# Patient Record
Sex: MALE | Race: WHITE | NOT HISPANIC OR LATINO | Employment: OTHER | URBAN - METROPOLITAN AREA
[De-identification: names, ages, dates, MRNs, and addresses within clinical notes are randomized per-mention and may not be internally consistent; named-entity substitution may affect disease eponyms.]

---

## 2017-01-10 ENCOUNTER — HOSPITAL ENCOUNTER (OUTPATIENT)
Dept: URGENT CARE | Facility: CLINIC | Age: 82
Discharge: HOME | End: 2017-01-10
Attending: FAMILY MEDICINE | Admitting: FAMILY MEDICINE

## 2017-12-12 ENCOUNTER — HOSPITAL ENCOUNTER (OUTPATIENT)
Dept: FAMILY MEDICINE CLINIC | Facility: CLINIC | Age: 82
Setting detail: SPECIMEN
Discharge: HOME | End: 2017-12-12
Attending: PREVENTIVE MEDICINE | Admitting: PREVENTIVE MEDICINE

## 2017-12-12 LAB — CALCIUM SERPL-MCNC: 9.5 MG/DL (ref 8.9–10.3)

## 2018-04-16 ENCOUNTER — HOSPITAL ENCOUNTER (OUTPATIENT)
Dept: FAMILY MEDICINE CLINIC | Facility: CLINIC | Age: 83
Setting detail: SPECIMEN
Discharge: HOME | End: 2018-04-16
Attending: PREVENTIVE MEDICINE | Admitting: PREVENTIVE MEDICINE

## 2018-04-16 LAB
ALBUMIN SERPL-MCNC: 3.9 G/DL (ref 3.5–4.8)
ALBUMIN/GLOB SERPL: 1.4 {RATIO} (ref 1–1.7)
ALP SERPL-CCNC: 59 IU/L (ref 32–91)
ALT SERPL-CCNC: 21 IU/L (ref 17–63)
ANION GAP SERPL CALC-SCNC: 10.9 MMOL/L (ref 10–20)
AST SERPL-CCNC: 23 IU/L (ref 15–41)
BASOPHILS # BLD AUTO: 0 10*3/UL (ref 0–0.2)
BASOPHILS NFR BLD AUTO: 1 % (ref 0–2)
BILIRUB SERPL-MCNC: 0.9 MG/DL (ref 0.3–1.2)
BUN SERPL-MCNC: 13 MG/DL (ref 8–20)
BUN/CREAT SERPL: 10 (ref 6.2–20.3)
CALCIUM SERPL-MCNC: 9.4 MG/DL (ref 8.9–10.3)
CHLORIDE SERPL-SCNC: 104 MMOL/L (ref 101–111)
CONV CO2: 28 MMOL/L (ref 22–32)
CONV TOTAL PROTEIN: 6.6 G/DL (ref 6.1–7.9)
CREAT UR-MCNC: 1.3 MG/DL (ref 0.7–1.2)
DIFFERENTIAL METHOD BLD: (no result)
EOSINOPHIL # BLD AUTO: 0.2 10*3/UL (ref 0–0.3)
EOSINOPHIL # BLD AUTO: 4 % (ref 0–3)
ERYTHROCYTE [DISTWIDTH] IN BLOOD BY AUTOMATED COUNT: 13.3 % (ref 11.5–14.5)
GLOBULIN UR ELPH-MCNC: 2.7 G/DL (ref 2.5–3.8)
GLUCOSE SERPL-MCNC: 95 MG/DL (ref 65–99)
HCT VFR BLD AUTO: 47.8 % (ref 40–54)
HGB BLD-MCNC: 15.7 G/DL (ref 14–18)
LYMPHOCYTES # BLD AUTO: 1.2 10*3/UL (ref 0.8–4.8)
LYMPHOCYTES NFR BLD AUTO: 18 % (ref 18–42)
MCH RBC QN AUTO: 27.6 PG (ref 26–32)
MCHC RBC AUTO-ENTMCNC: 32.9 G/DL (ref 32–36)
MCV RBC AUTO: 83.9 FL (ref 80–94)
MONOCYTES # BLD AUTO: 0.5 10*3/UL (ref 0.1–1.3)
MONOCYTES NFR BLD AUTO: 8 % (ref 2–11)
NEUTROPHILS # BLD AUTO: 4.7 10*3/UL (ref 2.3–8.6)
NEUTROPHILS NFR BLD AUTO: 69 % (ref 50–75)
NRBC BLD AUTO-RTO: 0 /100{WBCS}
NRBC/RBC NFR BLD MANUAL: 0 10*3/UL
PLATELET # BLD AUTO: 262 10*3/UL (ref 150–450)
PMV BLD AUTO: 7.7 FL (ref 7.4–10.4)
POTASSIUM SERPL-SCNC: 3.9 MMOL/L (ref 3.6–5.1)
RBC # BLD AUTO: 5.7 10*6/UL (ref 4.6–6)
SODIUM SERPL-SCNC: 139 MMOL/L (ref 136–144)
WBC # BLD AUTO: 6.6 10*3/UL (ref 4.5–11.5)

## 2018-05-21 ENCOUNTER — ON CAMPUS - OUTPATIENT (AMBULATORY)
Dept: URBAN - METROPOLITAN AREA HOSPITAL 77 | Facility: HOSPITAL | Age: 83
End: 2018-05-21

## 2018-05-21 DIAGNOSIS — Z86.010 PERSONAL HISTORY OF COLONIC POLYPS: ICD-10-CM

## 2018-05-21 DIAGNOSIS — D12.3 BENIGN NEOPLASM OF TRANSVERSE COLON: ICD-10-CM

## 2018-05-21 DIAGNOSIS — D12.2 BENIGN NEOPLASM OF ASCENDING COLON: ICD-10-CM

## 2018-05-21 DIAGNOSIS — K63.5 POLYP OF COLON: ICD-10-CM

## 2018-05-21 DIAGNOSIS — K64.8 OTHER HEMORRHOIDS: ICD-10-CM

## 2018-05-21 DIAGNOSIS — K57.90 DIVERTICULOSIS OF INTESTINE, PART UNSPECIFIED, WITHOUT PERFO: ICD-10-CM

## 2018-05-21 PROCEDURE — 45385 COLONOSCOPY W/LESION REMOVAL: CPT | Mod: PT | Performed by: INTERNAL MEDICINE

## 2018-05-21 PROCEDURE — 45388 COLONOSCOPY W/ABLATION: CPT | Mod: 59,PT | Performed by: INTERNAL MEDICINE

## 2018-05-21 PROCEDURE — 45388 COLONOSCOPY W/ABLATION: CPT | Mod: PT,59 | Performed by: INTERNAL MEDICINE

## 2019-07-15 ENCOUNTER — OFFICE (AMBULATORY)
Dept: URBAN - METROPOLITAN AREA CLINIC 64 | Facility: CLINIC | Age: 84
End: 2019-07-15

## 2019-07-15 VITALS
WEIGHT: 185 LBS | HEART RATE: 77 BPM | SYSTOLIC BLOOD PRESSURE: 128 MMHG | HEIGHT: 68 IN | DIASTOLIC BLOOD PRESSURE: 82 MMHG

## 2019-07-15 DIAGNOSIS — R19.4 CHANGE IN BOWEL HABIT: ICD-10-CM

## 2019-07-15 DIAGNOSIS — Z86.010 PERSONAL HISTORY OF COLONIC POLYPS: ICD-10-CM

## 2019-07-15 PROCEDURE — 99213 OFFICE O/P EST LOW 20 MIN: CPT | Performed by: INTERNAL MEDICINE

## 2019-07-15 RX ORDER — SORBITOL SOLUTION 70 %
SOLUTION, ORAL MISCELLANEOUS
Qty: 100 | Refills: 0 | Status: ACTIVE
Start: 2019-07-15

## 2019-08-16 RX ORDER — DILTIAZEM HYDROCHLORIDE 180 MG/1
CAPSULE, COATED, EXTENDED RELEASE ORAL
Qty: 90 CAPSULE | Refills: 3 | Status: SHIPPED | OUTPATIENT
Start: 2019-08-16 | End: 2020-07-02

## 2019-11-21 ENCOUNTER — OFFICE VISIT (OUTPATIENT)
Dept: FAMILY MEDICINE CLINIC | Facility: CLINIC | Age: 84
End: 2019-11-21

## 2019-11-21 VITALS
SYSTOLIC BLOOD PRESSURE: 158 MMHG | HEART RATE: 79 BPM | OXYGEN SATURATION: 98 % | BODY MASS INDEX: 29.19 KG/M2 | TEMPERATURE: 97.7 F | DIASTOLIC BLOOD PRESSURE: 88 MMHG | RESPIRATION RATE: 16 BRPM | HEIGHT: 67 IN | WEIGHT: 186 LBS

## 2019-11-21 DIAGNOSIS — D69.6 THROMBOCYTOPENIA (HCC): ICD-10-CM

## 2019-11-21 DIAGNOSIS — M85.88 OSTEOPENIA OF OTHER SITE: ICD-10-CM

## 2019-11-21 DIAGNOSIS — M85.80 OSTEOPENIA, UNSPECIFIED LOCATION: ICD-10-CM

## 2019-11-21 DIAGNOSIS — E78.5 HYPERLIPIDEMIA, UNSPECIFIED HYPERLIPIDEMIA TYPE: ICD-10-CM

## 2019-11-21 DIAGNOSIS — Z85.46 HISTORY OF MALIGNANT NEOPLASM OF PROSTATE: Primary | ICD-10-CM

## 2019-11-21 DIAGNOSIS — R93.6 ABNORMAL FINDINGS ON DIAGNOSTIC IMAGING OF LIMBS: ICD-10-CM

## 2019-11-21 DIAGNOSIS — E55.9 VITAMIN D DEFICIENCY: ICD-10-CM

## 2019-11-21 DIAGNOSIS — I10 ESSENTIAL HYPERTENSION: ICD-10-CM

## 2019-11-21 PROBLEM — R19.8 ALTERED BOWEL FUNCTION: Status: ACTIVE | Noted: 2019-05-09

## 2019-11-21 PROBLEM — Z92.3 HX OF RADIATION THERAPY: Status: ACTIVE | Noted: 2017-10-03

## 2019-11-21 PROBLEM — D22.9 MELANOCYTIC NEVUS: Status: ACTIVE | Noted: 2019-05-09

## 2019-11-21 PROBLEM — G47.9 SLEEP DISORDER: Status: ACTIVE | Noted: 2017-10-23

## 2019-11-21 PROBLEM — M54.2 NECK PAIN: Status: ACTIVE | Noted: 2018-04-12

## 2019-11-21 PROBLEM — Z80.0 FAMILY HISTORY OF MALIGNANT NEOPLASM OF COLON: Status: ACTIVE | Noted: 2019-05-09

## 2019-11-21 LAB
25(OH)D3 SERPL-MCNC: 27.8 NG/ML (ref 30–100)
ALBUMIN SERPL-MCNC: 4.3 G/DL (ref 3.5–5.2)
ALBUMIN/GLOB SERPL: 1.3 G/DL
ALP SERPL-CCNC: 70 U/L (ref 39–117)
ALT SERPL W P-5'-P-CCNC: 20 U/L (ref 1–41)
ANION GAP SERPL CALCULATED.3IONS-SCNC: 11.6 MMOL/L (ref 5–15)
AST SERPL-CCNC: 20 U/L (ref 1–40)
BASOPHILS # BLD AUTO: 0.09 10*3/MM3 (ref 0–0.2)
BASOPHILS NFR BLD AUTO: 1.5 % (ref 0–1.5)
BILIRUB SERPL-MCNC: 0.3 MG/DL (ref 0.2–1.2)
BUN BLD-MCNC: 17 MG/DL (ref 8–23)
BUN/CREAT SERPL: 12.5 (ref 7–25)
CALCIUM SPEC-SCNC: 9.5 MG/DL (ref 8.6–10.5)
CHLORIDE SERPL-SCNC: 105 MMOL/L (ref 98–107)
CHOLEST SERPL-MCNC: 215 MG/DL (ref 0–200)
CO2 SERPL-SCNC: 25.4 MMOL/L (ref 22–29)
CREAT BLD-MCNC: 1.36 MG/DL (ref 0.76–1.27)
DEPRECATED RDW RBC AUTO: 39.3 FL (ref 37–54)
EOSINOPHIL # BLD AUTO: 0.24 10*3/MM3 (ref 0–0.4)
EOSINOPHIL NFR BLD AUTO: 4 % (ref 0.3–6.2)
ERYTHROCYTE [DISTWIDTH] IN BLOOD BY AUTOMATED COUNT: 12.8 % (ref 12.3–15.4)
GFR SERPL CREATININE-BSD FRML MDRD: 50 ML/MIN/1.73
GLOBULIN UR ELPH-MCNC: 3.2 GM/DL
GLUCOSE BLD-MCNC: 100 MG/DL (ref 65–99)
HCT VFR BLD AUTO: 47.6 % (ref 37.5–51)
HDLC SERPL-MCNC: 40 MG/DL (ref 40–60)
HGB BLD-MCNC: 15.7 G/DL (ref 13–17.7)
IMM GRANULOCYTES # BLD AUTO: 0.05 10*3/MM3 (ref 0–0.05)
IMM GRANULOCYTES NFR BLD AUTO: 0.8 % (ref 0–0.5)
LDLC SERPL CALC-MCNC: 157 MG/DL (ref 0–100)
LDLC/HDLC SERPL: 3.94 {RATIO}
LYMPHOCYTES # BLD AUTO: 0.83 10*3/MM3 (ref 0.7–3.1)
LYMPHOCYTES NFR BLD AUTO: 14 % (ref 19.6–45.3)
MCH RBC QN AUTO: 27.8 PG (ref 26.6–33)
MCHC RBC AUTO-ENTMCNC: 33 G/DL (ref 31.5–35.7)
MCV RBC AUTO: 84.2 FL (ref 79–97)
MONOCYTES # BLD AUTO: 0.5 10*3/MM3 (ref 0.1–0.9)
MONOCYTES NFR BLD AUTO: 8.4 % (ref 5–12)
NEUTROPHILS # BLD AUTO: 4.23 10*3/MM3 (ref 1.7–7)
NEUTROPHILS NFR BLD AUTO: 71.3 % (ref 42.7–76)
NRBC BLD AUTO-RTO: 0 /100 WBC (ref 0–0.2)
PLATELET # BLD AUTO: 260 10*3/MM3 (ref 140–450)
PMV BLD AUTO: 10.4 FL (ref 6–12)
POTASSIUM BLD-SCNC: 4.4 MMOL/L (ref 3.5–5.2)
PROT SERPL-MCNC: 7.5 G/DL (ref 6–8.5)
RBC # BLD AUTO: 5.65 10*6/MM3 (ref 4.14–5.8)
SODIUM BLD-SCNC: 142 MMOL/L (ref 136–145)
TRIGL SERPL-MCNC: 88 MG/DL (ref 0–150)
VLDLC SERPL-MCNC: 17.6 MG/DL (ref 5–40)
WBC NRBC COR # BLD: 5.94 10*3/MM3 (ref 3.4–10.8)

## 2019-11-21 PROCEDURE — 99213 OFFICE O/P EST LOW 20 MIN: CPT | Performed by: PREVENTIVE MEDICINE

## 2019-11-21 PROCEDURE — G0009 ADMIN PNEUMOCOCCAL VACCINE: HCPCS | Performed by: PREVENTIVE MEDICINE

## 2019-11-21 PROCEDURE — 80061 LIPID PANEL: CPT | Performed by: PREVENTIVE MEDICINE

## 2019-11-21 PROCEDURE — 3008F BODY MASS INDEX DOCD: CPT | Performed by: PREVENTIVE MEDICINE

## 2019-11-21 PROCEDURE — 36415 COLL VENOUS BLD VENIPUNCTURE: CPT | Performed by: PREVENTIVE MEDICINE

## 2019-11-21 PROCEDURE — 90670 PCV13 VACCINE IM: CPT | Performed by: PREVENTIVE MEDICINE

## 2019-11-21 PROCEDURE — 85025 COMPLETE CBC W/AUTO DIFF WBC: CPT | Performed by: PREVENTIVE MEDICINE

## 2019-11-21 PROCEDURE — 80053 COMPREHEN METABOLIC PANEL: CPT | Performed by: PREVENTIVE MEDICINE

## 2019-11-21 PROCEDURE — 82306 VITAMIN D 25 HYDROXY: CPT | Performed by: PREVENTIVE MEDICINE

## 2019-11-21 PROCEDURE — G0439 PPPS, SUBSEQ VISIT: HCPCS | Performed by: PREVENTIVE MEDICINE

## 2019-11-21 NOTE — PROGRESS NOTES
The ABCs of the Annual Wellness Visit  Subsequent Medicare Wellness Visit    Chief Complaint   Patient presents with   • Medicare Wellness-subsequent     fasting    • Hypertension   • Hyperlipidemia       Subjective   History of Present Illness:  Hever Guallpa is a 84 y.o. male who presents for a Subsequent Medicare Wellness Visit.    HEALTH RISK ASSESSMENT    Recent Hospitalizations:  No hospitalization(s) within the last year.    Current Medical Providers:  Patient Care Team:  Maddie Martinez MD as PCP - General (Family Medicine)  Maddie Martinez MD as PCP - Claims Attributed    Smoking Status:  Social History     Tobacco Use   Smoking Status Former Smoker   • Types: Cigarettes   • Start date:    • Last attempt to quit:    • Years since quittin.9   Smokeless Tobacco Never Used       Alcohol Consumption:  Social History     Substance and Sexual Activity   Alcohol Use No   • Frequency: Never       Depression Screen:   PHQ-2/PHQ-9 Depression Screening 2019   Little interest or pleasure in doing things 0   Feeling down, depressed, or hopeless 0   Total Score 0       Fall Risk Screen:  ESSIEADI Fall Risk Assessment was completed, and patient is at LOW risk for falls.Assessment completed on:2019    Health Habits and Functional and Cognitive Screening:  Functional & Cognitive Status 2019   Do you have difficulty preparing food and eating? No   Do you have difficulty bathing yourself, getting dressed or grooming yourself? No   Do you have difficulty using the toilet? No   Do you have difficulty moving around from place to place? No   Do you have trouble with steps or getting out of a bed or a chair? No   Current Diet Well Balanced Diet   Dental Exam Up to date   Eye Exam Not up to date   Exercise (times per week) 7 times per week   Current Exercise Activities Include Walking   Do you need help using the phone?  No   Are you deaf or do you have serious difficulty hearing?  Yes   Do  you need help with transportation? No   Do you need help shopping? No   Do you need help preparing meals?  No   Do you need help with housework?  No   Do you need help with laundry? No   Do you need help taking your medications? No   Do you need help managing money? No   Do you ever drive or ride in a car without wearing a seat belt? No   Have you felt unusual stress, anger or loneliness in the last month? No   Who do you live with? Spouse   If you need help, do you have trouble finding someone available to you? No   Have you been bothered in the last four weeks by sexual problems? No   Do you have difficulty concentrating, remembering or making decisions? No         Does the patient have evidence of cognitive impairment? No    Asprin use counseling:Does not need ASA (and currently is not on it)    Age-appropriate Screening Schedule:  Refer to the list below for future screening recommendations based on patient's age, sex and/or medical conditions. Orders for these recommended tests are listed in the plan section. The patient has been provided with a written plan.    Health Maintenance   Topic Date Due   • TDAP/TD VACCINES (1 - Tdap) 08/23/1954   • ZOSTER VACCINE (1 of 2) 08/23/1985   • PNEUMOCOCCAL VACCINES (65+ LOW/MEDIUM RISK) (2 of 2 - PCV13) 06/01/2011   • LIPID PANEL  11/21/2019   • DXA SCAN  11/21/2019   • INFLUENZA VACCINE  Completed          The following portions of the patient's history were reviewed and updated as appropriate: allergies, current medications, past family history, past medical history, past social history, past surgical history and problem list.    Outpatient Medications Prior to Visit   Medication Sig Dispense Refill   • diltiaZEM CD (CARDIZEM CD) 180 MG 24 hr capsule TAKE 1 CAPSULE BY MOUTH EVERY DAY 90 capsule 3     No facility-administered medications prior to visit.        Patient Active Problem List   Diagnosis   • Neck pain   • Body mass index (BMI) of 28.0-28.9 in adult   • Altered  "bowel function   • Family history of malignant neoplasm of colon   • History of malignant neoplasm of prostate   • Hx of radiation therapy   • Hyperlipidemia   • Hypertension   • Melanocytic nevus   • Osteopenia   • Sleep disorder   • Thrombocytopenia (CMS/HCC)   • Vitamin D deficiency       Advanced Care Planning:  Patient does not have an advance directive - information provided to the patient today    Review of Systems    Compared to one year ago, the patient feels his physical health is better  Compared to one year ago, the patient feels his mental health is the same.    Reviewed chart for potential of high risk medication in the elderly: yes  Reviewed chart for potential of harmful drug interactions in the elderly:yes    Objective         Vitals:    11/21/19 1017 11/21/19 1025   BP: 151/95 158/88   BP Location: Right arm Left arm   Patient Position: Sitting Sitting   Cuff Size: Large Adult Large Adult   Pulse: 79    Resp: 16    Temp: 97.7 °F (36.5 °C)    TempSrc: Oral    SpO2: 98%    Weight: 84.4 kg (186 lb)    Height: 170.2 cm (67\")    PainSc: 0-No pain        Body mass index is 29.13 kg/m².  Discussed the patient's BMI with him. The BMI is in the acceptable range.    Physical Exam          Assessment/Plan   Medicare Risks and Personalized Health Plan  CMS Preventative Services Quick Reference  Advance Directive Discussion    The above risks/problems have been discussed with the patient.  Pertinent information has been shared with the patient in the After Visit Summary.  Follow up plans and orders are seen below in the Assessment/Plan Section.    Diagnoses and all orders for this visit:    1. History of malignant neoplasm of prostate (Primary)  -     PSA Screen    2. Essential hypertension  -     CBC Auto Differential  -     Comprehensive Metabolic Panel    3. Hyperlipidemia, unspecified hyperlipidemia type  -     Lipid Panel    4. Thrombocytopenia (CMS/HCC)  -     CBC Auto Differential    5. Vitamin D " deficiency  -     Vitamin D 25 Hydroxy    6. Osteopenia, unspecified location  -     DEXA Bone Density Axial; Future      Follow Up:  No Follow-up on file.     An After Visit Summary and PPPS were given to the patient.

## 2019-11-21 NOTE — PROGRESS NOTES
"Alonzo Guallpa is a 84 y.o. male presents for   Chief Complaint   Patient presents with   • Medicare Wellness-subsequent     fasting    • Hypertension   • Hyperlipidemia     Abdominal improving with Colestipol and slight cough with am creamy sputum but no fever for a few days since got cold  Health Maintenance Due   Topic Date Due   • TDAP/TD VACCINES (1 - Tdap) 08/23/1954   • ZOSTER VACCINE (1 of 2) 08/23/1985   • PNEUMOCOCCAL VACCINES (65+ LOW/MEDIUM RISK) (2 of 2 - PCV13) 06/01/2011   • MEDICARE ANNUAL WELLNESS  11/21/2019   • LIPID PANEL  11/21/2019   • DXA SCAN  11/21/2019       History of Present Illness     Vitals:    11/21/19 1017 11/21/19 1025   BP: 151/95 158/88   BP Location: Right arm Left arm   Patient Position: Sitting Sitting   Cuff Size: Large Adult Large Adult   Pulse: 79    Resp: 16    Temp: 97.7 °F (36.5 °C)    TempSrc: Oral    SpO2: 98%    Weight: 84.4 kg (186 lb)    Height: 170.2 cm (67\")      Body mass index is 29.13 kg/m².    Current Outpatient Medications on File Prior to Visit   Medication Sig Dispense Refill   • diltiaZEM CD (CARDIZEM CD) 180 MG 24 hr capsule TAKE 1 CAPSULE BY MOUTH EVERY DAY 90 capsule 3     No current facility-administered medications on file prior to visit.        The following portions of the patient's history were reviewed and updated as appropriate: allergies, current medications, past family history, past medical history, past social history, past surgical history and problem list.    Review of Systems   Constitutional: Negative.    HENT: Negative.  Negative for sinus pressure and sore throat.    Eyes: Negative.    Respiratory: Positive for cough.    Cardiovascular: Negative.    Gastrointestinal: Positive for abdominal pain.   Endocrine: Negative.    Genitourinary: Negative.    Musculoskeletal: Negative.    Skin: Negative.    Allergic/Immunologic: Positive for environmental allergies.   Neurological: Negative.    Hematological: Negative.  "   Psychiatric/Behavioral: Negative.        Objective   Physical Exam   Constitutional: He is oriented to person, place, and time. He appears well-developed and well-nourished.   HENT:   Head: Normocephalic and atraumatic.   Eyes: Conjunctivae and EOM are normal. Pupils are equal, round, and reactive to light.   Neck: Normal range of motion. Neck supple.   Cardiovascular: Normal rate, regular rhythm, normal heart sounds and intact distal pulses.   Pulmonary/Chest: Effort normal and breath sounds normal.   Abdominal: Soft. Bowel sounds are normal.   Musculoskeletal: Normal range of motion.   Neurological: He is alert and oriented to person, place, and time.   Skin: Skin is warm and dry.   Psychiatric: He has a normal mood and affect.   Nursing note and vitals reviewed.    PHQ-9 Total Score: 0    Assessment/Plan   Hever was seen today for medicare wellness-subsequent, hypertension and hyperlipidemia.    Diagnoses and all orders for this visit:    History of malignant neoplasm of prostate    Essential hypertension  -     CBC Auto Differential  -     Comprehensive Metabolic Panel    Hyperlipidemia, unspecified hyperlipidemia type  -     Lipid Panel    Thrombocytopenia (CMS/HCC)  -     CBC Auto Differential    Vitamin D deficiency  -     Vitamin D 25 Hydroxy    Osteopenia, unspecified location  -     DEXA Bone Density Axial; Future    Abnormal findings on diagnostic imaging of limbs   -     DEXA Bone Density Axial; Future    Other orders  -     Cancel: PSA Screen  -     Pneumococcal Conjugate Vaccine 13-Valent All (PCV13)        Patient Instructions   Call Dr. Florez if abdomen still problem 2/14/2020.  Wait 2 months to consider Td and ShingRix  Advance Directive    Advance directives are legal documents that let you make choices ahead of time about your health care and medical treatment in case you become unable to communicate for yourself. Advance directives are a way for you to communicate your wishes to family,  friends, and health care providers. This can help convey your decisions about end-of-life care if you become unable to communicate.  Discussing and writing advance directives should happen over time rather than all at once. Advance directives can be changed depending on your situation and what you want, even after you have signed the advance directives.  If you do not have an advance directive, some states assign family decision makers to act on your behalf based on how closely you are related to them. Each state has its own laws regarding advance directives. You may want to check with your health care provider, , or state representative about the laws in your state. There are different types of advance directives, such as:  · Medical power of .  · Living will.  · Do not resuscitate (DNR) or do not attempt resuscitation (DNAR) order.  Health care proxy and medical power of   A health care proxy, also called a health care agent, is a person who is appointed to make medical decisions for you in cases in which you are unable to make the decisions yourself. Generally, people choose someone they know well and trust to represent their preferences. Make sure to ask this person for an agreement to act as your proxy. A proxy may have to exercise judgment in the event of a medical decision for which your wishes are not known.  A medical power of  is a legal document that names your health care proxy. Depending on the laws in your state, after the document is written, it may also need to be:  · Signed.  · Notarized.  · Dated.  · Copied.  · Witnessed.  · Incorporated into your medical record.  You may also want to appoint someone to manage your financial affairs in a situation in which you are unable to do so. This is called a durable power of  for finances. It is a separate legal document from the durable power of  for health care. You may choose the same person or someone  different from your health care proxy to act as your agent in financial matters.  If you do not appoint a proxy, or if there is a concern that the proxy is not acting in your best interests, a court-appointed guardian may be designated to act on your behalf.  Living will  A living will is a set of instructions documenting your wishes about medical care when you cannot express them yourself. Health care providers should keep a copy of your living will in your medical record. You may want to give a copy to family members or friends. To alert caregivers in case of an emergency, you can place a card in your wallet to let them know that you have a living will and where they can find it. A living will is used if you become:  · Terminally ill.  · Incapacitated.  · Unable to communicate or make decisions.  Items to consider in your living will include:  · The use or non-use of life-sustaining equipment, such as dialysis machines and breathing machines (ventilators).  · A DNR or DNAR order, which is the instruction not to use cardiopulmonary resuscitation (CPR) if breathing or heartbeat stops.  · The use or non-use of tube feeding.  · Withholding of food and fluids.  · Comfort (palliative) care when the goal becomes comfort rather than a cure.  · Organ and tissue donation.  A living will does not give instructions for distributing your money and property if you should pass away. It is recommended that you seek the advice of a  when writing a will. Decisions about taxes, beneficiaries, and asset distribution will be legally binding. This process can relieve your family and friends of any concerns surrounding disputes or questions that may come up about the distribution of your assets.  DNR or DNAR  A DNR or DNAR order is a request not to have CPR in the event that your heart stops beating or you stop breathing. If a DNR or DNAR order has not been made and shared, a health care provider will try to help any patient whose  heart has stopped or who has stopped breathing. If you plan to have surgery, talk with your health care provider about how your DNR or DNAR order will be followed if problems occur.  Summary  · Advance directives are the legal documents that allow you to make choices ahead of time about your health care and medical treatment in case you become unable to communicate for yourself.  · The process of discussing and writing advance directives should happen over time. You can change the advance directives, even after you have signed them.  · Advance directives include DNR or DNAR orders, living kelly, and designating an agent as your medical power of .  This information is not intended to replace advice given to you by your health care provider. Make sure you discuss any questions you have with your health care provider.  Document Released: 03/26/2009 Document Revised: 11/06/2017 Document Reviewed: 11/06/2017  [employer] Interactive Patient Education © 2019 [employer]      Needs eye exam

## 2019-11-21 NOTE — PATIENT INSTRUCTIONS
Call Dr. Florez if abdomen still problem 2/14/2020.  Wait 2 months to consider Td and ShingRix  Advance Directive    Advance directives are legal documents that let you make choices ahead of time about your health care and medical treatment in case you become unable to communicate for yourself. Advance directives are a way for you to communicate your wishes to family, friends, and health care providers. This can help convey your decisions about end-of-life care if you become unable to communicate.  Discussing and writing advance directives should happen over time rather than all at once. Advance directives can be changed depending on your situation and what you want, even after you have signed the advance directives.  If you do not have an advance directive, some states assign family decision makers to act on your behalf based on how closely you are related to them. Each state has its own laws regarding advance directives. You may want to check with your health care provider, , or state representative about the laws in your state. There are different types of advance directives, such as:  · Medical power of .  · Living will.  · Do not resuscitate (DNR) or do not attempt resuscitation (DNAR) order.  Health care proxy and medical power of   A health care proxy, also called a health care agent, is a person who is appointed to make medical decisions for you in cases in which you are unable to make the decisions yourself. Generally, people choose someone they know well and trust to represent their preferences. Make sure to ask this person for an agreement to act as your proxy. A proxy may have to exercise judgment in the event of a medical decision for which your wishes are not known.  A medical power of  is a legal document that names your health care proxy. Depending on the laws in your state, after the document is written, it may also need to  be:  · Signed.  · Notarized.  · Dated.  · Copied.  · Witnessed.  · Incorporated into your medical record.  You may also want to appoint someone to manage your financial affairs in a situation in which you are unable to do so. This is called a durable power of  for finances. It is a separate legal document from the durable power of  for health care. You may choose the same person or someone different from your health care proxy to act as your agent in financial matters.  If you do not appoint a proxy, or if there is a concern that the proxy is not acting in your best interests, a court-appointed guardian may be designated to act on your behalf.  Living will  A living will is a set of instructions documenting your wishes about medical care when you cannot express them yourself. Health care providers should keep a copy of your living will in your medical record. You may want to give a copy to family members or friends. To alert caregivers in case of an emergency, you can place a card in your wallet to let them know that you have a living will and where they can find it. A living will is used if you become:  · Terminally ill.  · Incapacitated.  · Unable to communicate or make decisions.  Items to consider in your living will include:  · The use or non-use of life-sustaining equipment, such as dialysis machines and breathing machines (ventilators).  · A DNR or DNAR order, which is the instruction not to use cardiopulmonary resuscitation (CPR) if breathing or heartbeat stops.  · The use or non-use of tube feeding.  · Withholding of food and fluids.  · Comfort (palliative) care when the goal becomes comfort rather than a cure.  · Organ and tissue donation.  A living will does not give instructions for distributing your money and property if you should pass away. It is recommended that you seek the advice of a  when writing a will. Decisions about taxes, beneficiaries, and asset distribution will be  legally binding. This process can relieve your family and friends of any concerns surrounding disputes or questions that may come up about the distribution of your assets.  DNR or DNAR  A DNR or DNAR order is a request not to have CPR in the event that your heart stops beating or you stop breathing. If a DNR or DNAR order has not been made and shared, a health care provider will try to help any patient whose heart has stopped or who has stopped breathing. If you plan to have surgery, talk with your health care provider about how your DNR or DNAR order will be followed if problems occur.  Summary  · Advance directives are the legal documents that allow you to make choices ahead of time about your health care and medical treatment in case you become unable to communicate for yourself.  · The process of discussing and writing advance directives should happen over time. You can change the advance directives, even after you have signed them.  · Advance directives include DNR or DNAR orders, living kelly, and designating an agent as your medical power of .  This information is not intended to replace advice given to you by your health care provider. Make sure you discuss any questions you have with your health care provider.  Document Released: 03/26/2009 Document Revised: 11/06/2017 Document Reviewed: 11/06/2017  [employer] Interactive Patient Education © 2019 [employer]      Needs eye exam

## 2020-07-02 RX ORDER — DILTIAZEM HYDROCHLORIDE 180 MG/1
CAPSULE, COATED, EXTENDED RELEASE ORAL
Qty: 90 CAPSULE | Refills: 1 | Status: SHIPPED | OUTPATIENT
Start: 2020-07-02

## 2021-10-28 ENCOUNTER — FLU SHOT (OUTPATIENT)
Dept: FAMILY MEDICINE CLINIC | Facility: CLINIC | Age: 86
End: 2021-10-28

## 2021-10-28 DIAGNOSIS — Z23 NEED FOR INFLUENZA VACCINATION: Primary | ICD-10-CM

## 2021-10-28 PROCEDURE — G0008 ADMIN INFLUENZA VIRUS VAC: HCPCS | Performed by: PREVENTIVE MEDICINE

## 2021-10-28 PROCEDURE — 90662 IIV NO PRSV INCREASED AG IM: CPT | Performed by: PREVENTIVE MEDICINE

## 2023-05-17 ENCOUNTER — ON CAMPUS - OUTPATIENT (AMBULATORY)
Dept: URBAN - METROPOLITAN AREA HOSPITAL 77 | Facility: HOSPITAL | Age: 88
End: 2023-05-17

## 2023-05-17 DIAGNOSIS — K62.1 RECTAL POLYP: ICD-10-CM

## 2023-05-17 DIAGNOSIS — D12.3 BENIGN NEOPLASM OF TRANSVERSE COLON: ICD-10-CM

## 2023-05-17 DIAGNOSIS — Z86.010 PERSONAL HISTORY OF COLONIC POLYPS: ICD-10-CM

## 2023-05-17 DIAGNOSIS — D12.2 BENIGN NEOPLASM OF ASCENDING COLON: ICD-10-CM

## 2023-05-17 DIAGNOSIS — K57.30 DIVERTICULOSIS OF LARGE INTESTINE WITHOUT PERFORATION OR ABS: ICD-10-CM

## 2023-05-17 DIAGNOSIS — K64.8 OTHER HEMORRHOIDS: ICD-10-CM

## 2023-05-17 PROCEDURE — 45385 COLONOSCOPY W/LESION REMOVAL: CPT | Mod: PT | Performed by: INTERNAL MEDICINE

## 2024-03-26 ENCOUNTER — HOSPITAL ENCOUNTER (INPATIENT)
Facility: HOSPITAL | Age: 89
LOS: 4 days | Discharge: NURSING, REHAB OR LONG-TERM CARE | DRG: 393 | End: 2024-03-31
Attending: EMERGENCY MEDICINE | Admitting: EMERGENCY MEDICINE
Payer: MEDICARE

## 2024-03-26 DIAGNOSIS — T18.128A FOOD IMPACTION OF ESOPHAGUS, INITIAL ENCOUNTER: Primary | ICD-10-CM

## 2024-03-26 DIAGNOSIS — R13.10 DYSPHAGIA, UNSPECIFIED TYPE: ICD-10-CM
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
## 2024-03-26 PROCEDURE — 25010000002 GLUCAGON (RDNA) PER 1 MG: Performed by: NURSE PRACTITIONER

## 2024-03-26 PROCEDURE — G0378 HOSPITAL OBSERVATION PER HR: HCPCS

## 2024-03-26 PROCEDURE — 99285 EMERGENCY DEPT VISIT HI MDM: CPT

## 2024-03-26 RX ORDER — SODIUM CHLORIDE 0.9 % (FLUSH) 0.9 %
10 SYRINGE (ML) INJECTION AS NEEDED
Status: DISCONTINUED | OUTPATIENT
Start: 2024-03-26 | End: 2024-03-31 | Stop reason: HOSPADM

## 2024-03-26 RX ORDER — IBUPROFEN 600 MG/1
1 TABLET ORAL ONCE
Status: COMPLETED | OUTPATIENT
Start: 2024-03-26 | End: 2024-03-26

## 2024-03-26 RX ORDER — ONDANSETRON 2 MG/ML
4 INJECTION INTRAMUSCULAR; INTRAVENOUS EVERY 6 HOURS PRN
Status: DISCONTINUED | OUTPATIENT
Start: 2024-03-26 | End: 2024-03-27 | Stop reason: SDUPTHER

## 2024-03-26 RX ORDER — SODIUM CHLORIDE 9 MG/ML
40 INJECTION, SOLUTION INTRAVENOUS AS NEEDED
Status: DISCONTINUED | OUTPATIENT
Start: 2024-03-26 | End: 2024-03-31 | Stop reason: HOSPADM

## 2024-03-26 RX ORDER — AMOXICILLIN 250 MG
2 CAPSULE ORAL 2 TIMES DAILY PRN
Status: DISCONTINUED | OUTPATIENT
Start: 2024-03-26 | End: 2024-03-30

## 2024-03-26 RX ORDER — POLYETHYLENE GLYCOL 3350 17 G/17G
17 POWDER, FOR SOLUTION ORAL DAILY PRN
Status: DISCONTINUED | OUTPATIENT
Start: 2024-03-26 | End: 2024-03-30

## 2024-03-26 RX ORDER — CHOLECALCIFEROL (VITAMIN D3) 125 MCG
5 CAPSULE ORAL NIGHTLY PRN
Status: DISCONTINUED | OUTPATIENT
Start: 2024-03-26 | End: 2024-03-30

## 2024-03-26 RX ORDER — BISACODYL 10 MG
10 SUPPOSITORY, RECTAL RECTAL DAILY PRN
Status: DISCONTINUED | OUTPATIENT
Start: 2024-03-26 | End: 2024-03-30

## 2024-03-26 RX ORDER — BISACODYL 5 MG/1
5 TABLET, DELAYED RELEASE ORAL DAILY PRN
Status: DISCONTINUED | OUTPATIENT
Start: 2024-03-26 | End: 2024-03-30

## 2024-03-26 RX ORDER — SODIUM CHLORIDE 0.9 % (FLUSH) 0.9 %
10 SYRINGE (ML) INJECTION EVERY 12 HOURS SCHEDULED
Status: DISCONTINUED | OUTPATIENT
Start: 2024-03-27 | End: 2024-03-31 | Stop reason: HOSPADM

## 2024-03-26 RX ORDER — ACETAMINOPHEN 325 MG/1
650 TABLET ORAL EVERY 4 HOURS PRN
Status: DISCONTINUED | OUTPATIENT
Start: 2024-03-26 | End: 2024-03-30

## 2024-03-26 RX ADMIN — Medication 1 MG: at 22:04

## 2024-03-26 NOTE — LETTER
EMS Transport Request  For use at [facility], [place], [place], [place], and [place] only   Patient Name: Hever Guallpa : 1935   Weight:82.7 kg (182 lb 5.1 oz) Pick-up Location: [facility] BLS/ALS: BLS/ALS: BLS   Insurance: MEDICARE Auth End Date:    Pre-Cert #: D/C Summary complete:    Destination: Other [facility]   Contact Precautions: None   Equipment (O2, Fluids, etc.): None   Arrive By Date/Time: 3/31/2024 1500 Stretcher/WC: Wheelchair   CM Requesting: Tamie Loera RN Ext: [phone]   Notes/Medical Necessity: Please verify readiness with floor nurse.     ______________________________________________________________________    *Only 2 patient bags OR 1 carry-on size bag are permitted.  Wheelchairs and walkers CANNOT transported with the patient. Acknowledge: Yes

## 2024-03-27 ENCOUNTER — INPATIENT HOSPITAL (AMBULATORY)
Dept: URBAN - METROPOLITAN AREA HOSPITAL 84 | Facility: HOSPITAL | Age: 89
End: 2024-03-27
Payer: COMMERCIAL

## 2024-03-27 ENCOUNTER — ANESTHESIA (OUTPATIENT)
Dept: GASTROENTEROLOGY | Facility: HOSPITAL | Age: 89
End: 2024-03-27
Payer: MEDICARE

## 2024-03-27 ENCOUNTER — APPOINTMENT (OUTPATIENT)
Dept: CT IMAGING | Facility: HOSPITAL | Age: 89
DRG: 393 | End: 2024-03-27
Payer: MEDICARE

## 2024-03-27 ENCOUNTER — ANESTHESIA EVENT (OUTPATIENT)
Dept: GASTROENTEROLOGY | Facility: HOSPITAL | Age: 89
End: 2024-03-27
Payer: MEDICARE

## 2024-03-27 DIAGNOSIS — R13.10 DYSPHAGIA, UNSPECIFIED: ICD-10-CM

## 2024-03-27 DIAGNOSIS — K31.89 OTHER DISEASES OF STOMACH AND DUODENUM: ICD-10-CM

## 2024-03-27 DIAGNOSIS — T18.128A FOOD IN ESOPHAGUS CAUSING OTHER INJURY, INITIAL ENCOUNTER: ICD-10-CM

## 2024-03-27 LAB
ANION GAP SERPL CALCULATED.3IONS-SCNC: 11 MMOL/L (ref 5–15)
BASOPHILS # BLD AUTO: 0.06 10*3/MM3 (ref 0–0.2)
BASOPHILS NFR BLD AUTO: 0.7 % (ref 0–1.5)
BUN SERPL-MCNC: 18 MG/DL (ref 8–23)
BUN/CREAT SERPL: 14.1 (ref 7–25)
CALCIUM SPEC-SCNC: 9.2 MG/DL (ref 8.6–10.5)
CHLORIDE SERPL-SCNC: 106 MMOL/L (ref 98–107)
CO2 SERPL-SCNC: 25 MMOL/L (ref 22–29)
CREAT SERPL-MCNC: 1.28 MG/DL (ref 0.76–1.27)
DEPRECATED RDW RBC AUTO: 39.3 FL (ref 37–54)
EGFRCR SERPLBLD CKD-EPI 2021: 53.8 ML/MIN/1.73
EOSINOPHIL # BLD AUTO: 0.15 10*3/MM3 (ref 0–0.4)
EOSINOPHIL NFR BLD AUTO: 1.7 % (ref 0.3–6.2)
ERYTHROCYTE [DISTWIDTH] IN BLOOD BY AUTOMATED COUNT: 12.4 % (ref 12.3–15.4)
GLUCOSE SERPL-MCNC: 90 MG/DL (ref 65–99)
HCT VFR BLD AUTO: 48.3 % (ref 37.5–51)
HGB BLD-MCNC: 15.2 G/DL (ref 13–17.7)
IMM GRANULOCYTES # BLD AUTO: 0.05 10*3/MM3 (ref 0–0.05)
IMM GRANULOCYTES NFR BLD AUTO: 0.6 % (ref 0–0.5)
LYMPHOCYTES # BLD AUTO: 0.63 10*3/MM3 (ref 0.7–3.1)
LYMPHOCYTES NFR BLD AUTO: 7.3 % (ref 19.6–45.3)
MCH RBC QN AUTO: 27.2 PG (ref 26.6–33)
MCHC RBC AUTO-ENTMCNC: 31.5 G/DL (ref 31.5–35.7)
MCV RBC AUTO: 86.6 FL (ref 79–97)
MONOCYTES # BLD AUTO: 0.55 10*3/MM3 (ref 0.1–0.9)
MONOCYTES NFR BLD AUTO: 6.4 % (ref 5–12)
NEUTROPHILS NFR BLD AUTO: 7.17 10*3/MM3 (ref 1.7–7)
NEUTROPHILS NFR BLD AUTO: 83.3 % (ref 42.7–76)
NRBC BLD AUTO-RTO: 0 /100 WBC (ref 0–0.2)
PLATELET # BLD AUTO: 213 10*3/MM3 (ref 140–450)
PMV BLD AUTO: 9.6 FL (ref 6–12)
POTASSIUM SERPL-SCNC: 3.8 MMOL/L (ref 3.5–5.2)
RBC # BLD AUTO: 5.58 10*6/MM3 (ref 4.14–5.8)
SODIUM SERPL-SCNC: 142 MMOL/L (ref 136–145)
WBC NRBC COR # BLD AUTO: 8.61 10*3/MM3 (ref 3.4–10.8)

## 2024-03-27 PROCEDURE — 43450 DILATE ESOPHAGUS 1/MULT PASS: CPT | Performed by: INTERNAL MEDICINE

## 2024-03-27 PROCEDURE — 25010000002 ONDANSETRON PER 1 MG: Performed by: EMERGENCY MEDICINE

## 2024-03-27 PROCEDURE — 70490 CT SOFT TISSUE NECK W/O DYE: CPT

## 2024-03-27 PROCEDURE — 80048 BASIC METABOLIC PNL TOTAL CA: CPT | Performed by: EMERGENCY MEDICINE

## 2024-03-27 PROCEDURE — 43235 EGD DIAGNOSTIC BRUSH WASH: CPT | Performed by: INTERNAL MEDICINE

## 2024-03-27 PROCEDURE — 25010000002 GLYCOPYRROLATE 0.2 MG/ML SOLUTION: Performed by: NURSE ANESTHETIST, CERTIFIED REGISTERED

## 2024-03-27 PROCEDURE — 85025 COMPLETE CBC W/AUTO DIFF WBC: CPT | Performed by: EMERGENCY MEDICINE

## 2024-03-27 PROCEDURE — 92610 EVALUATE SWALLOWING FUNCTION: CPT

## 2024-03-27 PROCEDURE — 25810000003 LACTATED RINGERS PER 1000 ML: Performed by: STUDENT IN AN ORGANIZED HEALTH CARE EDUCATION/TRAINING PROGRAM

## 2024-03-27 PROCEDURE — 25010000002 PROPOFOL 1000 MG/100ML EMULSION: Performed by: NURSE ANESTHETIST, CERTIFIED REGISTERED

## 2024-03-27 PROCEDURE — 0D758ZZ DILATION OF ESOPHAGUS, VIA NATURAL OR ARTIFICIAL OPENING ENDOSCOPIC: ICD-10-PCS | Performed by: INTERNAL MEDICINE

## 2024-03-27 PROCEDURE — 99222 1ST HOSP IP/OBS MODERATE 55: CPT | Performed by: PSYCHIATRY & NEUROLOGY

## 2024-03-27 PROCEDURE — 71250 CT THORAX DX C-: CPT

## 2024-03-27 PROCEDURE — 25010000002 SUCCINYLCHOLINE PER 20 MG: Performed by: NURSE ANESTHETIST, CERTIFIED REGISTERED

## 2024-03-27 RX ORDER — SODIUM CHLORIDE, SODIUM LACTATE, POTASSIUM CHLORIDE, CALCIUM CHLORIDE 600; 310; 30; 20 MG/100ML; MG/100ML; MG/100ML; MG/100ML
75 INJECTION, SOLUTION INTRAVENOUS CONTINUOUS
Status: DISCONTINUED | OUTPATIENT
Start: 2024-03-27 | End: 2024-03-31 | Stop reason: HOSPADM

## 2024-03-27 RX ORDER — LIDOCAINE HYDROCHLORIDE 20 MG/ML
INJECTION, SOLUTION INFILTRATION; PERINEURAL AS NEEDED
Status: DISCONTINUED | OUTPATIENT
Start: 2024-03-27 | End: 2024-03-27 | Stop reason: SURG

## 2024-03-27 RX ORDER — PHENYLEPHRINE HCL IN 0.9% NACL 1 MG/10 ML
SYRINGE (ML) INTRAVENOUS AS NEEDED
Status: DISCONTINUED | OUTPATIENT
Start: 2024-03-27 | End: 2024-03-27 | Stop reason: SURG

## 2024-03-27 RX ORDER — EPHEDRINE SULFATE 5 MG/ML
5 INJECTION INTRAVENOUS ONCE AS NEEDED
Status: DISCONTINUED | OUTPATIENT
Start: 2024-03-27 | End: 2024-03-27 | Stop reason: HOSPADM

## 2024-03-27 RX ORDER — GLYCOPYRROLATE 0.2 MG/ML
INJECTION INTRAMUSCULAR; INTRAVENOUS AS NEEDED
Status: DISCONTINUED | OUTPATIENT
Start: 2024-03-27 | End: 2024-03-27 | Stop reason: SURG

## 2024-03-27 RX ORDER — DIPHENHYDRAMINE HYDROCHLORIDE 50 MG/ML
12.5 INJECTION INTRAMUSCULAR; INTRAVENOUS
Status: DISCONTINUED | OUTPATIENT
Start: 2024-03-27 | End: 2024-03-27 | Stop reason: HOSPADM

## 2024-03-27 RX ORDER — LABETALOL HYDROCHLORIDE 5 MG/ML
5 INJECTION, SOLUTION INTRAVENOUS
Status: DISCONTINUED | OUTPATIENT
Start: 2024-03-27 | End: 2024-03-27 | Stop reason: HOSPADM

## 2024-03-27 RX ORDER — HYDRALAZINE HYDROCHLORIDE 20 MG/ML
5 INJECTION INTRAMUSCULAR; INTRAVENOUS
Status: DISCONTINUED | OUTPATIENT
Start: 2024-03-27 | End: 2024-03-27 | Stop reason: HOSPADM

## 2024-03-27 RX ORDER — ONDANSETRON 2 MG/ML
4 INJECTION INTRAMUSCULAR; INTRAVENOUS ONCE AS NEEDED
Status: DISCONTINUED | OUTPATIENT
Start: 2024-03-27 | End: 2024-03-27 | Stop reason: HOSPADM

## 2024-03-27 RX ORDER — ONDANSETRON 4 MG/1
4 TABLET, ORALLY DISINTEGRATING ORAL EVERY 6 HOURS PRN
Status: DISCONTINUED | OUTPATIENT
Start: 2024-03-27 | End: 2024-03-30

## 2024-03-27 RX ORDER — IPRATROPIUM BROMIDE AND ALBUTEROL SULFATE 2.5; .5 MG/3ML; MG/3ML
3 SOLUTION RESPIRATORY (INHALATION) ONCE AS NEEDED
Status: DISCONTINUED | OUTPATIENT
Start: 2024-03-27 | End: 2024-03-27 | Stop reason: HOSPADM

## 2024-03-27 RX ORDER — PROPOFOL 10 MG/ML
INJECTION, EMULSION INTRAVENOUS CONTINUOUS PRN
Status: DISCONTINUED | OUTPATIENT
Start: 2024-03-27 | End: 2024-03-27 | Stop reason: SURG

## 2024-03-27 RX ORDER — SUCCINYLCHOLINE CHLORIDE 20 MG/ML
INJECTION INTRAMUSCULAR; INTRAVENOUS AS NEEDED
Status: DISCONTINUED | OUTPATIENT
Start: 2024-03-27 | End: 2024-03-27 | Stop reason: SURG

## 2024-03-27 RX ORDER — SODIUM CHLORIDE 9 MG/ML
30 INJECTION, SOLUTION INTRAVENOUS CONTINUOUS PRN
Status: CANCELLED | OUTPATIENT
Start: 2024-03-27

## 2024-03-27 RX ORDER — SERTRALINE HYDROCHLORIDE 25 MG/1
25 TABLET, FILM COATED ORAL DAILY
Status: DISCONTINUED | OUTPATIENT
Start: 2024-03-27 | End: 2024-03-28

## 2024-03-27 RX ORDER — ONDANSETRON 2 MG/ML
4 INJECTION INTRAMUSCULAR; INTRAVENOUS EVERY 6 HOURS PRN
Status: DISCONTINUED | OUTPATIENT
Start: 2024-03-27 | End: 2024-03-30

## 2024-03-27 RX ADMIN — LIDOCAINE HYDROCHLORIDE 80 MG: 20 INJECTION, SOLUTION INFILTRATION; PERINEURAL at 09:11

## 2024-03-27 RX ADMIN — GLYCOPYRROLATE 0.2 MG: 0.2 INJECTION INTRAMUSCULAR; INTRAVENOUS at 09:13

## 2024-03-27 RX ADMIN — ONDANSETRON 4 MG: 2 INJECTION INTRAMUSCULAR; INTRAVENOUS at 10:52

## 2024-03-27 RX ADMIN — SUCCINYLCHOLINE CHLORIDE 80 MG: 20 INJECTION, SOLUTION INTRAMUSCULAR; INTRAVENOUS at 09:11

## 2024-03-27 RX ADMIN — Medication 10 ML: at 07:45

## 2024-03-27 RX ADMIN — SODIUM CHLORIDE 300 ML: 9 INJECTION, SOLUTION INTRAVENOUS at 09:26

## 2024-03-27 RX ADMIN — SODIUM CHLORIDE, POTASSIUM CHLORIDE, SODIUM LACTATE AND CALCIUM CHLORIDE 75 ML/HR: 600; 310; 30; 20 INJECTION, SOLUTION INTRAVENOUS at 20:36

## 2024-03-27 RX ADMIN — PROPOFOL INJECTABLE EMULSION 200 MCG/KG/MIN: 10 INJECTION, EMULSION INTRAVENOUS at 09:11

## 2024-03-27 RX ADMIN — Medication 100 MCG: at 09:16

## 2024-03-27 RX ADMIN — PROPOFOL INJECTABLE EMULSION 125 MCG/KG/MIN: 10 INJECTION, EMULSION INTRAVENOUS at 09:14

## 2024-03-27 RX ADMIN — SODIUM CHLORIDE 125 ML/HR: 9 INJECTION, SOLUTION INTRAVENOUS at 08:59

## 2024-03-27 RX ADMIN — Medication 10 ML: at 10:52

## 2024-03-27 RX ADMIN — Medication 100 MCG: at 09:22

## 2024-03-27 NOTE — PLAN OF CARE
Problem: Adult Inpatient Plan of Care  Goal: Plan of Care Review  Outcome: Ongoing, Progressing  Goal: Patient-Specific Goal (Individualized)  Outcome: Ongoing, Progressing  Goal: Absence of Hospital-Acquired Illness or Injury  Outcome: Ongoing, Progressing  Goal: Optimal Comfort and Wellbeing  Outcome: Ongoing, Progressing  Goal: Readiness for Transition of Care  Outcome: Ongoing, Progressing  Intervention: Mutually Develop Transition Plan  Recent Flowsheet Documentation  Taken 3/26/2024 2322 by Emma Jaramillo, RN  Transportation Anticipated: family or friend will provide  Patient/Family Anticipated Services at Transition:   Patient/Family Anticipates Transition to: home  Taken 3/26/2024 2312 by Emma Jaramillo, RN  Equipment Currently Used at Home: none     Problem: Swallowing Impairment  Goal: Optimal Eating/Swallowing without Aspir  Outcome: Ongoing, Progressing     Problem: Suicide Risk  Goal: Absence of Self-Harm  Outcome: Ongoing, Progressing   Goal Outcome Evaluation:      Patient oriented to room and surroundings, denies pain or distress, resting quietly in bed, placed on suicidal precautions at this time with 1:1 observation, safety maintained call light in reach

## 2024-03-27 NOTE — THERAPY EVALUATION
Acute Care - Speech Language Pathology   Swallow Initial Evaluation  Buzz     Patient Name: Hever Guallpa  : 1935  MRN: [id number]  Today's Date: 3/27/2024               Admit Date: 3/26/2024    Visit Dx:     ICD-10-CM ICD-9-CM   1. Food impaction of esophagus, initial encounter  T18.128A 935.1    [id number].F3XA      Patient Active Problem List   Diagnosis    Neck pain    Body mass index (BMI) of 28.0-28.9 in adult    Altered bowel function    Family history of malignant neoplasm of colon    History of malignant neoplasm of prostate    Hx of radiation therapy    Hyperlipidemia    Hypertension    Melanocytic nevus    Osteopenia    Sleep disorder    Thrombocytopenia    Vitamin D deficiency    Food impaction of esophagus     Past Medical History:   Diagnosis Date    Hyperlipidemia     Hypertension     Neck pain     Osteopenia     Sleep disorder     Thrombocytopenia     Vitamin D deficiency      Past Surgical History:   Procedure Laterality Date    COLONOSCOPY      CYST REMOVAL         SLP Recommendation and Plan  SLP Swallowing Diagnosis: suspected pharyngeal dysphagia (24 1500)  SLP Diet Recommendation: NPO (24 1500)     SLP Rec. for Method of Medication Administration: meds via alternate route (24 1500)        Recommended Diagnostics: reassess via clinical swallow evaluation, VFSS (MBS), other (see comments) (VFSS if indicated) (24 1500)  Swallow Criteria for Skilled Therapeutic Interventions Met: demonstrates skilled criteria (24)     Rehab Potential/Prognosis, Swallowing: good, to achieve stated therapy goals (24 1500)  Therapy Frequency (Swallow): PRN (24 1500)  Predicted Duration Therapy Intervention (Days): until discharge (24 1500)  Oral Care Recommendations: Oral Care BID/PRN (24 1500)                                               SWALLOW EVALUATION (Last 72 Hours)       SLP Adult Swallow Evaluation       Row Name 24       Rehab  "Evaluation    Document Type evaluation  -    Subjective Information complains of;pain  Pt points to neck region  -    Patient Observations alert;cooperative;agree to therapy  -    Patient/Family/Caregiver Comments/Observations no family at bedside  -    Patient Effort good  -    Comment Clincal swallow eval completed on today's date.  -       General Information    Patient Profile Reviewed yes  -    Pertinent History Of Current Problem Information pulled from chart review: Pt is an   \"88-year-old male who presents with acute food impaction. He states he was eating fried potatoes yesterday at 5 PM and that the food got stuck. He is unable to swallow secretions. He does feel a sense of relief this morning and wonders if the food bolus is passed but he is not sure. He reports a recent history of dysphagia and odynophagia. He has a history of reflux symptoms in the past but none recently. No unintentional weight loss. No blood thinners. Last EGD 2014 hiatal hernia GE junction ring and UES stricture status post dilation 54 [place]\" .       Pt is not familiar to [facility] ST dept.        CT SOFT TISSUE NECK WO CONTRAST    Date of Exam: 3/27/2024 1:07 PM EDT    Indication: dysphagia, s/p EGD with dilation and intubation.    Comparison: Chest CT 3/27/2024    Technique: Axial CT images were obtained of the neck without contrast administration.  Sagittal and coronal reconstructions were performed.  Automated exposure control and iterative reconstruction methods were used.   ...   Impression   Impression:    1. No acute findings in the neck.  2. 4 mm left upper lobe pulmonary nodule    Electronically Signed: Ant Aguilar MD   3/27/2024 1:26 PM EDT   Workstation ID: [id number]             CT CHEST WO CONTRAST DIAGNOSTIC     Date of Exam: 3/27/2024 1:07 PM EDT     Indication: Dysphagia, possible aspiration.     Comparison: Chest radiograph dated 7/25/2016     Technique: Axial CT images were obtained of the chest without " contrast administration.  Sagittal and coronal reconstructions were performed.  Automated exposure control and iterative reconstruction methods were used.     Findings:  For full findings of the soft tissues of the neck, please see separately dictated CT neck report. The visualized soft tissue structures at the base of the neck including the thyroid appear within normal limits. There is no lower cervical or axillary   adenopathy.     The heart size is normal. There is no pericardial effusion. The aorta is normal in caliber without evidence of aneurysm formation. There is coronary and aortic atherosclerotic calcification. The main pulmonary artery is normal in caliber. There are small   mediastinal and hilar lymph nodes not meeting size criteria for pathologic enlargement. There are calcified right hilar lymph nodes likely related to chronic granulomatous disease.     The tracheobronchial tree is patent. There is minimal amount of secretions within the dependent portion of the trachea. There is no abnormal bronchial wall thickening or bronchiectasis. There is no acute consolidation or pleural effusion. There is no   evidence of pneumothorax. There is minimal bandlike atelectasis within the bilateral lower lobes. There is a small 4 mm pulmonary nodule within the posterior aspect of the left apex best seen on image 23 of series 3.     The esophagus is normal in course and caliber with a small sliding-type hiatal hernia. Visualized portions of the upper abdomen demonstrate no acute findings. There are partially visualized exophytic cysts arising from the left kidney. There are   multilevel degenerative changes of the cervical and thoracic spine.     IMPRESSION:  Impression:  1. Minimal secretions within the posterior aspect of the trachea. No evidence of bronchiolitis or evidence of aspiration pneumonia.  2. Small 4 mm pulmonary nodule within the posterior aspect of the left upper lobe. Recommend follow-up chest CT in 1  "year if patient has risk factors such as smoking history.        Electronically Signed: Yohan Rodriguezelor    3/27/2024 1:36 PM EDT       -RM    Current Method of Nutrition NPO  -RM    Precautions/Limitations, Vision WFL;for purposes of eval  -RM    Precautions/Limitations, Hearing hearing impairment, bilaterally  -RM    Prior Level of Function-Swallowing no diet consistency restrictions;safe, efficient swallowing in all situations;other (see comments)  per pt report  -RM    Plans/Goals Discussed with patient;other (see comments)  pt's nurse  -RM       Pain    Additional Documentation Pain Scale: FACES Pre/Post-Treatment (Group)  -RM       Pain Scale: FACES Pre/Post-Treatment    Pain: FACES Scale, Pretreatment 0-->no hurt  -RM    Posttreatment Pain Rating 0-->no hurt  -RM       Oral Motor Structure and Function    Oral Lesions or Structural Abnormalities and/or variants none identified  -RM    Dentition Assessment missing teeth;other (see comments)  has partial, but not in place  -RM    Secretion Management WNL/WFL  -RM    Mucosal Quality dry  -RM       Oral Musculature and Cranial Nerve Assessment    Oral Motor, Comment Pt able to complete lingial protrusion. Pt declined any further oral motor task d/t \"my mouth is too dry\".  -RM       General Eating/Swallowing Observations    Respiratory Support Currently in Use room air  -RM    Eating/Swallowing Skills fed by SLP  -RM    Positioning During Eating upright 90 degree;upright in bed  -RM    Utensils Used spoon;other (see comments)  toothette  -RM    Consistencies Trialed ice chips;thin liquids  -RM       Clinical Swallow Eval    Clinical Swallow Evaluation Summary Clinical swallow evaluation completed on today's date. Pt is currently NPO. Pt complaint of coughing and difficulty swallowing. Pt reports \"I'm strangled\" when he swallows liquids.  He reports that these symptoms started last night 3/27/24 after eating potatoes. Pt underwent EGD this morning 3/27/24. Pt " "endorses odynophagia and globus sensation.     Pt agreeable to trials of ice chips and x1 tsp of thin liquid by spoon. Oral care completed prior to trials. Pt seated upright in bed at 90 degrees during trials. Pt completed x 2 trials of ice chips. Mastication time appeared WFL. Adequate labial seal noted with spoon. No anterior spillage. No cough or throat clear noted after trials of ice chips. Wet respirations/VQ noted. Pt completed x 1 trial of thin liquid by teaspoon. Pt appeared to initiate swallow. Severe coughing/coking noted after trial. Pt reporting \"I can't get anything back in\" (later explaining he couldn't catch his breath/inhale). Pt’s cough appeared strong; however non-productive, little fluid spit into emesis bag. Due to s/s of aspiration trials discontinued at this time. Discussed recommendations with pt and pt's nurse. Both in agreement.     Pt does not appear safe for PO at this time. ST recommends that pt remain NPO at this time. ST will continue to follow and re-evaluate pt's swallow clinically (and/or instrumentally if indicated) when appropriate.  -RM       SLP Evaluation Clinical Impression    SLP Swallowing Diagnosis suspected pharyngeal dysphagia  -RM    Functional Impact risk of aspiration/pneumonia  -RM    Rehab Potential/Prognosis, Swallowing good, to achieve stated therapy goals  -    Swallow Criteria for Skilled Therapeutic Interventions Met demonstrates skilled criteria  -RM       Recommendations    Therapy Frequency (Swallow) PRN  -RM    Predicted Duration Therapy Intervention (Days) until discharge  -RM    SLP Diet Recommendation NPO  -RM    Recommended Diagnostics reassess via clinical swallow evaluation;VFSS (MBS);other (see comments)  VFSS if indicated  -RM    Oral Care Recommendations Oral Care BID/PRN  -RM    SLP Rec. for Method of Medication Administration meds via alternate route  -RM       Swallow Goals (SLP)    Swallow LTGs Swallow Long Term Goal (free text)  -    Swallow " STGs diet tolerance goal selection (SLP)  -RM    Diet Tolerance Goal Selection (SLP) Swallow Short Term Goal 1  -RM       (LTG) Swallow    (LTG) Swallow The patient will maximize swallow function for least restrictive PO diet, exhibiting no complications associated with dysphagia, adequate PO intake, and demonstrating independent use of safe swallow strategies.  -RM    Progress/Outcomes (Swallow Long Term Goal) new goal  -RM       (STG) Swallow 1    (STG) Swallow 1 Patient will participate in ongoing assessment of swallow, including reevaluation clinically and/or including instrumental assessment of swallow if indicated, to further assess swallow function , intiate PO diet, and establish least restrictive diet  -RM    Progress/Outcomes (Swallow Short Term Goal 1) new goal  -RM              User Key  (r) = Recorded By, (t) = Taken By, (c) = Cosigned By      Initials Name Effective Dates    Jonathan David, SLP 01/26/23 -                     EDUCATION  The patient has been educated in the following areas:   Dysphagia (Swallowing Impairment) Oral Care/Hydration NPO rationale.        SLP GOALS       Row Name 03/27/24 1500       (LTG) Swallow    (LTG) Swallow The patient will maximize swallow function for least restrictive PO diet, exhibiting no complications associated with dysphagia, adequate PO intake, and demonstrating independent use of safe swallow strategies.  -RM    Progress/Outcomes (Swallow Long Term Goal) new goal  -RM       (STG) Swallow 1    (STG) Swallow 1 Patient will participate in ongoing assessment of swallow, including reevaluation clinically and/or including instrumental assessment of swallow if indicated, to further assess swallow function , intiate PO diet, and establish least restrictive diet  -RM    Progress/Outcomes (Swallow Short Term Goal 1) new goal  -RM              User Key  (r) = Recorded By, (t) = Taken By, (c) = Cosigned By      Initials Name Provider Type    Jonathan David, SLP  Speech and Language Pathologist                       Time Calculation:       Jonathan Cote, SLP  3/27/2024

## 2024-03-27 NOTE — ED NOTES
Np notified at this time about blood pressure. NP stated that she talked with family and family just wants to watch and wait for GI and got the food bolus removed and see if BP will come down on own. Patient is having no symptoms at this time.

## 2024-03-27 NOTE — CASE MANAGEMENT/SOCIAL WORK
Discharge Planning Assessment   Buzz     Patient Name: Hever Guallpa  MRN: [id number]  Today's Date: 3/27/2024    Admit Date: 3/26/2024    Plan: Routine home   Discharge Needs Assessment       Row Name 03/27/24 1611       Living Environment    People in Home alone    Current Living Arrangements home    Potentially Unsafe Housing Conditions none    In the past 12 months has the electric, gas, oil, or water company threatened to shut off services in your home? No    Primary Care Provided by self    Provides Primary Care For no one    Family Caregiver if Needed child(lissy), adult    Family Caregiver Names DOMITILA- Luke    Quality of Family Relationships helpful;involved;supportive    Able to Return to Prior Arrangements yes       Resource/Environmental Concerns    Resource/Environmental Concerns none    Transportation Concerns none       Transportation Needs    In the past 12 months, has lack of transportation kept you from medical appointments or from getting medications? no    In the past 12 months, has lack of transportation kept you from meetings, work, or from getting things needed for daily living? No       Food Insecurity    Within the past 12 months, you worried that your food would run out before you got the money to buy more. Never true    Within the past 12 months, the food you bought just didn't last and you didn't have money to get more. Never true       Transition Planning    Patient/Family Anticipates Transition to home    Patient/Family Anticipated Services at Transition none    Transportation Anticipated family or friend will provide       Discharge Needs Assessment    Readmission Within the Last 30 Days no previous admission in last 30 days    Equipment Currently Used at Home none    Concerns to be Addressed denies needs/concerns at this time    Anticipated Changes Related to Illness none    Equipment Needed After Discharge none                   Discharge Plan       Row Name 03/27/24 1614       Plan     Plan Routine home    Plan Comments CM met with patient at the bedside. Confirmed PCP, insurance, and pharmacy. Patient denies any difficulty affording medications. Patient is not current with any HHC/OPPT/OT services. Patient lives at home alone, is IADLS, and drives occasionally. Son in law- Luke will provide DC transport. DC Barriers: ST eval pending, patient presents with coughing stridor-like audible sounds when attempting to take a drink, will F/U tomorrow, GI following, EGD w/ dilation 3/26.                   Continued Care and Services - Admitted Since 3/26/2024    No active coordination exists for this encounter.       Expected Discharge Date and Time       Expected Discharge Date Expected Discharge Time    Mar 29, 2024            Demographic Summary       Row Name 03/27/24 1611       General Information    Admission Type observation    Arrived From emergency department    Required Notices Provided Observation Status Notice    Referral Source admission list    Reason for Consult discharge planning    Preferred Language English       Contact Information    Permission Granted to Share Info With     Contact Information Obtained for                    Functional Status       Row Name 03/27/24 1611       Functional Status    Usual Activity Tolerance moderate    Current Activity Tolerance moderate       Functional Status, IADL    Medications independent    Meal Preparation independent    Housekeeping independent    Laundry independent    Shopping independent       Mental Status    General Appearance WDL WDL       Mental Status Summary    Recent Changes in Mental Status/Cognitive Functioning no changes                  Thaddeus Garcia RN     Cell number [phone]  Office number [phone]

## 2024-03-27 NOTE — NURSING NOTE
Informed Dr. Oliver of patient failing bedside swallow study and will be unable to give PO zoloft today.

## 2024-03-27 NOTE — DISCHARGE INSTRUCTIONS
A responsible adult should stay with you and you should rest quietly for the rest of the day.    Do not drink alcohol, drive, operate any heavy machinery or power tools or make any legal/important decisions for the next 24 hours.     Progress your diet as tolerated.  If you begin to experience severe pain, increased shortness of breath, racing heartbeat or a fever above 101 F, seek immediate medical attention.     Follow up with MD as instructed. Call office for results in 3 to 5 days if needed.     Dr Ferreira [phone]

## 2024-03-27 NOTE — CONSULTS
Referring Provider: Dr Avendano   Reason for Consultation: SI      Chief complaint  depression     Subjective .     History of present illness:  The patient is a 88 y.o. male who was admitted secondary to food impaction. PMHx: HTN, esophageal strictures. Psych consult was requested 2ry to SI.  The pt acknowledged sxs of depression for the past 4 years after his wife passed away.  The patient reported feeling lonely, depressed, depression is rated as 7 out of 10.  Patient denied any perceptual disturbances, he denied suicidal and homicidal ideations.  The patient is trying to stay busy, he denied any significant memory changes.  The patient lives alone with supervision from his children.   No symptoms of ryan or hypomania  Anxiety can be intense at times when he thinks about his wife  The patient denied any intention to harm himself, the patient does have guns at home, but he stated he would never use them to kill himself.   Past psychiatric history: Depression, no history of inpatient psychiatric admissions no history of suicides  The patient stated he was prescribed antidepressants in the past and they were not effective      Review of Systems   All systems were reviewed and negative except for:  Constitution:  positive for fatigue  Gastrointestinal: positive for  difficulty / pain with swallowing  Behavioral/Psych: positive for  depression    History    Past Medical History:   Diagnosis Date    Hyperlipidemia     Hypertension     Neck pain     Osteopenia     Sleep disorder     Thrombocytopenia     Vitamin D deficiency           Family History   Family history unknown: Yes        Social History     Tobacco Use    Smoking status: Former     Current packs/day: 0.00     Types: Cigarettes     Start date:      Quit date:      Years since quittin.2    Smokeless tobacco: Never   Vaping Use    Vaping status: Never Used   Substance Use Topics    Alcohol use: No    Drug use: No          No medications prior to  "admission.        Scheduled Meds:  sodium chloride, 10 mL, Intravenous, Q12H         Continuous Infusions:       PRN Meds:    acetaminophen    senna-docusate sodium **AND** polyethylene glycol **AND** bisacodyl **AND** bisacodyl    melatonin    ondansetron    [COMPLETED] Insert Peripheral IV **AND** sodium chloride    sodium chloride    sodium chloride      Allergies:  Patient has no known allergies.      Objective     Vital Signs   /85 (BP Location: Left arm, Patient Position: Lying)   Pulse 109   Temp 97.5 °F (36.4 °C) (Oral)   Resp 17   Ht 172.7 cm (68\")   Wt 81.6 kg (179 lb 14.3 oz)   SpO2 99%   BMI 27.35 kg/m²     Physical Exam:    Musculoskeletal:   Muscle strength and tone: WNL in UE  Abnormal Movements: none   Gait: unable to assess, the pt was in bed      General Appearance:    In NAD       Mental Status Exam:   Hygiene:   good  Cooperation:  Cooperative  Eye Contact:  Good  Behavior and Psychomotor Activity: Appropriate  Speech:  Normal  Mood: depressed anxious   Affect:  Appropriate and Congruent  Thought Process:  Goal directed, Linear, and Organized  Associations: Intact   Thought Content:   mood congruent   Language: appropriate   Suicidal Ideations:   denied   Homicidal:  None  Hallucinations:  None  Delusion:  None  Orientation:  To person, Place, Time, and Situation  Memory:   fair   Concentration and computation:fair  Attention span: fair  Fund of knowledge: appropriate   Reliability:  good  Insight:  Good  Judgement:  Good  Impulse Control:  Good      Medications and allergies reviewed      Lab Results   Component Value Date    GLUCOSE 90 03/27/2024    CALCIUM 9.2 03/27/2024     03/27/2024    K 3.8 03/27/2024    CO2 25.0 03/27/2024     03/27/2024    BUN 18 03/27/2024    CREATININE 1.28 (H) 03/27/2024    EGFRIFNONA 50 (L) 11/21/2019    BCR 14.1 03/27/2024    ANIONGAP 11.0 03/27/2024       Last Urine Toxicity           No data to display                No results found for: " "\"PHENYTOIN\", \"PHENOBARB\", \"VALPROATE\", \"CBMZ\"    Lab Results   Component Value Date     03/27/2024    BUN 18 03/27/2024    CREATININE 1.28 (H) 03/27/2024    WBC 8.61 03/27/2024       Brief Urine Lab Results       None            Assessment & Plan       Food impaction of esophagus          Assessment: Major depressive d/o moderate single episode, bereavement  Treatment Plan: The patient presented with the symptoms of depression, bereavement.  The patient reported unsuccessful trials of antidepressants.   The patient denied any intention to harm himself, denied making suicidal statements, denied having a plan  Nursing records indicated \"no thoughts of harm or self\"   per Patient  Will start sertraline 25 mg p.o. daily   consult for grief therapy  A safety plan was discussed with the patient, he verbalized understanding, he will keep his guns locked and unloaded, [place]  Cont to provide support, will follow   Treatment Plan discussed with: Patient and nursing     I discussed the patients findings and my recommendations with patient and nursing staff    I have reviewed and approved the behavioral health treatment plans and problem list. Yes  Thank you for the consult   Referring MD has access to consult report and progress notes in EMR       This document has been electronically signed by Chantal Oliver MD  March 27, 2024 14:14 EDT    Part of this note may be an electronic transcription/translation of spoken language to printed text using the Dragon Dictation System.      "

## 2024-03-27 NOTE — ANESTHESIA PREPROCEDURE EVALUATION
Anesthesia Evaluation     Patient summary reviewed and Nursing notes reviewed                Airway   Mallampati: II  TM distance: >3 FB  Neck ROM: full  No difficulty expected  Dental - normal exam     Pulmonary    Cardiovascular     (+) hypertension, hyperlipidemia      Neuro/Psych  GI/Hepatic/Renal/Endo      Musculoskeletal     (+) neck pain  Abdominal    Substance History      OB/GYN          Other                    Anesthesia Plan    ASA 3     general   Rapid sequence  intravenous induction     Anesthetic plan, risks, benefits, and alternatives have been provided, discussed and informed consent has been obtained with: patient.    Plan discussed with CRNA.    CODE STATUS:    Level Of Support Discussed With: Patient  Code Status (Patient has no pulse and is not breathing): CPR (Attempt to Resuscitate)  Medical Interventions (Patient has pulse or is breathing): Full Support

## 2024-03-27 NOTE — CONSULTS
"Initial spiritual care consult. Pt is calm, alone, and in bed. He welcomed  and spoke about his hope to complete his swallow test tomorrow to be able to go home. When asked if he'll be going home to someone, pt became tearful and said that his wife had  and he was alone. He processed how he has heard people say that he needs to get over the loss, but he cannot. He used a lot of \"need to, supposed to, have to\" language, concerning his grief;  gave him permission to be sad.He had theological questions about the afterlife and other biblical questions. His voice began to give out from the talking and  offered to return tomorrow and he said OK.  prayed for the pt and he was thankful.  will follow up for support and encouragement. No other needs at this time.    "

## 2024-03-27 NOTE — PLAN OF CARE
Goal Outcome Evaluation:   A/O x4 pleasant and cooperative with all care. RA no SOA productive cough noted. Patient remains NPO for EGD today. Suicide precautions with sitter at bedside, this am no thoughts of harm to self per patient

## 2024-03-27 NOTE — CONSULTS
"[facility] Medicine [facility]  Consult Note    Patient Name: Hever Guallpa  : 1935  MRN: [id number]  Primary Care Physician:  Maddie Martinez MD  Referring Physician: No Known Provider  Date of admission: 3/26/2024  Date and Time of Care: 2024 at 5 pm    Inpatient Hospitalist Consult  Consult performed by: Scar Castro MD  Consult ordered by: Robson Thompson PA-C            Reason for Consult/ Chief Complaint:   Medical management  Dysphagia with food impaction and trouble swallowing    Consult Requested By: Robson Thompson PA-C     Subjective:     History of Present Illness:   Per the documentation by Shakir Ferreira MD , dated 2024,  \"This is an 88-year-old male who presents with acute food impaction. He states he was eating fried potatoes yesterday at 5 PM and that the food got stuck. He is unable to swallow secretions. He does feel a sense of relief this morning and wonders if the food bolus is passed but he is not sure. He reports a recent history of dysphagia and odynophagia. He has a history of reflux symptoms in the past but none recently. No unintentional weight loss. No blood thinners. Last EGD  hiatal hernia GE junction ring and UES stricture status post dilation 54 Salvadorean. \"    Review of Systems:   Review of Systems  Review of Systems was done and negative except as in the note     Personal History:     Past Medical History:   Diagnosis Date    Hyperlipidemia     Hypertension     Neck pain     Osteopenia     Sleep disorder     Thrombocytopenia     Vitamin D deficiency        Past Surgical History:   Procedure Laterality Date    COLONOSCOPY      CYST REMOVAL         Family History: The patient said that his parents had diabetes.  Otherwise pertinent FHx was reviewed and not pertinent to current issue.    Social History:  reports that he quit smoking about 64 years ago. His smoking use included cigarettes. He started smoking about 68 years ago. " He has never used smokeless tobacco. He reports that he does not drink alcohol and does not use drugs.    Home Medications:      The patient said that he is not taking any medications  Allergies:  No Known Allergies      Objective:     Vital Signs  Temp:  [97.5 °F (36.4 °C)-97.9 °F (36.6 °C)] 97.5 °F (36.4 °C)  Heart Rate:  [] 109  Resp:  [12-20] 17  BP: (106-220)/() 164/85   Body mass index is 27.35 kg/m².    Physical Exam  General: NAD2  CVS: S1/S2 present, RRR, no M/R/G  Lungs: Clear bilaterally.  No additional sounds  Abdomen: Soft nontender positive bowel sounds  Ext: No lower extremity edema  Skin: No rash or lesions  Neuro: no focal deficits  Psych: Mood is normal    Scheduled Meds   sertraline, 25 mg, Oral, Daily  sodium chloride, 10 mL, Intravenous, Q12H       PRN Meds     acetaminophen    senna-docusate sodium **AND** polyethylene glycol **AND** bisacodyl **AND** bisacodyl    melatonin    ondansetron    [COMPLETED] Insert Peripheral IV **AND** sodium chloride    sodium chloride    sodium chloride   Infusions         Diagnostic Data    Results from last 7 days   Lab Units 03/27/24  0425   WBC 10*3/mm3 8.61   HEMOGLOBIN g/dL 15.2   HEMATOCRIT % 48.3   PLATELETS 10*3/mm3 213   GLUCOSE mg/dL 90   CREATININE mg/dL 1.28*   BUN mg/dL 18   SODIUM mmol/L 142   POTASSIUM mmol/L 3.8   ANION GAP mmol/L 11.0       CT Chest Without Contrast Diagnostic    Result Date: 3/27/2024  Impression: 1. Minimal secretions within the posterior aspect of the trachea. No evidence of bronchiolitis or evidence of aspiration pneumonia. 2. Small 4 mm pulmonary nodule within the posterior aspect of the left upper lobe. Recommend follow-up chest CT in 1 year if patient has risk factors such as smoking history. Electronically Signed: Yohan Alcala  3/27/2024 1:36 PM EDT  Workstation ID: [id number]    CT Soft Tissue Neck Without Contrast    Result Date: 3/27/2024  Impression: 1. No acute findings in the neck. 2. 4 mm left upper  lobe pulmonary nodule Electronically Signed: Ant Aguilar MD  3/27/2024 1:26 PM EDT  Workstation ID: [id number]       I reviewed the patient's new clinical results.    Assessment/Plan:     Active and Resolved Problems  Active Hospital Problems    Diagnosis  POA    **Food impaction of esophagus [T18.128A, W44.F3XA]  Yes    Dysphagia [R13.10]  Yes      Resolved Hospital Problems   No resolved problems to display.       Food impaction of the esophagus  The patient was evaluated by EGD by GI on 3/27/2023  Impression:  Food impaction status post spontaneous passage  History of dysphagia and odynophagia status post 52 [place] Zendejas dilation  Gastric erosion  The patient was later reporting coughing and stridor with sips of water status post EGD.  The patient is having difficulty with swallowing in the neck area.  CT of the neck and chest were ordered with no acute findings in the neck and 4 mm left upper lobe pulmonary nodule  1. Minimal secretions within the posterior aspect of the trachea. No evidence of bronchiolitis or evidence of aspiration pneumonia.  2. Small 4 mm pulmonary nodule within the posterior aspect of the left upper lobe. Recommend follow-up chest CT in 1 year if patient has risk factors such as smoking history.    The patient was evaluated by speech therapy and recommended n.p.o. at this time and they will continue to follow and reevaluate the patient swallow      Major depression  The patient was evaluated by psychiatry and started on sertraline 25 p.o. daily              DVT prophylaxis:  Mechanical DVT prophylaxis orders are present.           Code status is   Code Status and Medical Interventions:   Ordered at: 03/26/24 1707     Level Of Support Discussed With:    Patient     Code Status (Patient has no pulse and is not breathing):    CPR (Attempt to Resuscitate)     Medical Interventions (Patient has pulse or is breathing):    Full Support       Plan for disposition: Pending speech therapy  eval    Time: 30 minutes        Signature: Electronically signed by Scar Castro MD, 03/27/24, 17:08 EDT.  [facility] Hospitalist Team

## 2024-03-27 NOTE — OP NOTE
ESOPHAGOGASTRODUODENOSCOPY Procedure Report    Patient Name:  Hever Guallpa  YOB: 1935    Date of Surgery:  3/27/2024     Pre-Op Diagnosis:  Food impaction of esophagus, initial encounter [T18.128A, W44.F3XA]  Dysphagia and odynophagia       Post Op Diagnosis:  Gastric erosion      Procedure/CPT® Codes:      Procedure(s):  ESOPHAGOGASTRODUODENOSCOPY with non-guided esophageal dilation using 52 Fr. Bougie    Staff:  Surgeon(s):  Shakir Ferreira MD         Anesthesia: General    Implants:    Nothing was implanted during the procedure    Specimen:        See Below    No blood loss    Complications:  None     Description of Procedure:  Informed consent was obtained for the procedure, including sedation.  Risks of perforation, hemorrhage, adverse drug reaction and aspiration were discussed.  The patient was brought into the endoscopy suite. Continuous cardiopulmonary monitoring was performed. The patient was placed in the left lateral decubitus position.  The bite block was inserted into the patient's mouth. After adequate sedation was attained, the Olympus gastroscope was inserted into the patient's mouth and advanced to the second portion of the duodenum without difficulty.  Circumferential examination was performed. A retroflex exam was performed in the patient's stomach.  On completion of the exam, the bowel was decompressed, the scope was removed from the patient, the patient tolerated the procedure well, there were no immediate post-operative complications.     Examination of the esophagus showed normal mucosa.  No food bolus present.  No strictures or rings or stenosis present.  A 52 Swedish Zendejas dilator was passed without resistance.  Repeat esophagoscopy showed no mucosal tearing.  Examination of the stomach showed antral erosion with a small streak of old heme present  Retroflex examination of the stomach was normal   Examination of the duodenum showed normal  mucosa      Impression:  Food impaction status post spontaneous passage  History of dysphagia and odynophagia status post 52 French Zendejas dilation  Gastric erosion    Recommendations:  Repeat EGD as needed for recurrent dysphagia  Okay for diet and discharge from GI standpoint  Recommend daily PPI      Shakir Ferreira MD     Date: 3/27/2024  Time: 09:22 EDT

## 2024-03-27 NOTE — CONSULTS
GI CONSULT  NOTE:    Referring Provider:  ER    Chief complaint: food impaction    Subjective .     History of present illness: This is an 88-year-old male who presents with acute food impaction.  He states he was eating fried potatoes yesterday at 5 PM and that the food got stuck.  He is unable to swallow secretions.  He does feel a sense of relief this morning and wonders if the food bolus is passed but he is not sure.  He reports a recent history of dysphagia and odynophagia.  He has a history of reflux symptoms in the past but none recently.  No unintentional weight loss.  No blood thinners.  Last EGD  hiatal hernia GE junction ring and UES stricture status post dilation 54 Maltese.      Endo History:  Colonoscopy  polyps diverticulosis internal hemorrhoids    Past Medical History:  Past Medical History:   Diagnosis Date    Hyperlipidemia     Hypertension     Neck pain     Osteopenia     Sleep disorder     Thrombocytopenia     Vitamin D deficiency        Past Surgical History:  Past Surgical History:   Procedure Laterality Date    COLONOSCOPY      CYST REMOVAL         Social History:  Social History     Tobacco Use    Smoking status: Former     Current packs/day: 0.00     Types: Cigarettes     Start date:      Quit date:      Years since quittin.2    Smokeless tobacco: Never   Vaping Use    Vaping status: Never Used   Substance Use Topics    Alcohol use: No    Drug use: No       Family History:  Family History   Family history unknown: Yes       Medications:  No medications prior to admission.       Scheduled Meds:sodium chloride, 10 mL, Intravenous, Q12H      Continuous Infusions:   PRN Meds:.  acetaminophen    atropine    senna-docusate sodium **AND** polyethylene glycol **AND** bisacodyl **AND** bisacodyl    diphenhydrAMINE    ePHEDrine Sulfate (Pressors)    hydrALAZINE    ipratropium-albuterol    labetalol    lidocaine (cardiac)    melatonin    ondansetron    ondansetron    [COMPLETED]  Insert Peripheral IV **AND** sodium chloride    sodium chloride    sodium chloride    ALLERGIES:  Patient has no known allergies.    ROS:  The following systems were reviewed and negative;   Constitution:  No fevers, chills, no unintentional weight loss  Skin: no rash, no jaundice  Eyes:  No blurry vision, no eye pain  HENT:  No change in hearing or smell  Resp:  No dyspnea or cough  CV:  No chest pain or palpitations  :  No dysuria, hematuria  Musculoskeletal:  No leg cramps or arthralgias  Neuro:  No tremor, no numbness  Psych:  No depression or confusion    Objective     Vital Signs:   Vitals:    03/26/24 2232 03/26/24 2314 03/27/24 0522 03/27/24 0810   BP: (!) 214/101 (!) 211/83 157/91 175/87   BP Location:  Left arm Left arm Left arm   Patient Position:  Lying Lying Lying   Pulse: 77 70 70 76   Resp:  18 18 16   Temp:  97.6 °F (36.4 °C)  97.9 °F (36.6 °C)   TempSrc:  Oral  Oral   SpO2: 95% 100% 97% 91%   Weight:  81.6 kg (179 lb 14.3 oz)     Height:           Physical Exam:       General Appearance:    Awake and alert, in no acute distress   Head:    Normocephalic, without obvious abnormality, atraumatic   Eyes:            Conjunctivae normal, anicteric sclera, pupils equal   Ears:    Ears appear intact with no abnormalities noted   Throat:   No oral lesions, no thrush, oral mucosa moist   Neck:   Supple, no JVD   Lungs:     Clear to auscultation bilaterally, respirations regular, even and unlabored    Heart:    Regular rhythm and normal rate, normal S1 and S2, no            Murmur appreciated   Chest Wall:    No abnormalities observed   Abdomen:     Normal bowel sounds, soft, non-tender, no rebound or guarding, non-distended, no hepatosplenomegaly   Rectal:     Deferred   Extremities:   Moves all extremities, no edema, no cyanosis   Pulses:   Pulses palpable and equal bilaterally   Skin:   No rash, no jaundice, normal palpaion   Lymph nodes:   No cervical, supraclavicular or submandibular palpable adenopathy    Neurologic:   Cranial nerves 2 - 12 grossly intact, no asterixis       Results Review:   I reviewed the patient's labs and imaging.  Lab Results (last 24 hours)       Procedure Component Value Units Date/Time    Basic Metabolic Panel [[id number]]  (Abnormal) Collected: 03/27/24 0425    Specimen: Blood Updated: 03/27/24 0514     Glucose 90 mg/dL      BUN 18 mg/dL      Creatinine 1.28 mg/dL      Sodium 142 mmol/L      Potassium 3.8 mmol/L      Chloride 106 mmol/L      CO2 25.0 mmol/L      Calcium 9.2 mg/dL      BUN/Creatinine Ratio 14.1     Anion Gap 11.0 mmol/L      eGFR 53.8 mL/min/1.73     Narrative:      GFR Normal >60  Chronic Kidney Disease <60  Kidney Failure <15    The GFR formula is only valid for adults with stable renal function between ages 18 and 70.    CBC Auto Differential [[id number]]  (Abnormal) Collected: 03/27/24 0425    Specimen: Blood Updated: 03/27/24 0447     WBC 8.61 10*3/mm3      RBC 5.58 10*6/mm3      Hemoglobin 15.2 g/dL      Hematocrit 48.3 %      MCV 86.6 fL      MCH 27.2 pg      MCHC 31.5 g/dL      RDW 12.4 %      RDW-SD 39.3 fl      MPV 9.6 fL      Platelets 213 10*3/mm3      Neutrophil % 83.3 %      Lymphocyte % 7.3 %      Monocyte % 6.4 %      Eosinophil % 1.7 %      Basophil % 0.7 %      Immature Grans % 0.6 %      Neutrophils, Absolute 7.17 10*3/mm3      Lymphocytes, Absolute 0.63 10*3/mm3      Monocytes, Absolute 0.55 10*3/mm3      Eosinophils, Absolute 0.15 10*3/mm3      Basophils, Absolute 0.06 10*3/mm3      Immature Grans, Absolute 0.05 10*3/mm3      nRBC 0.0 /100 WBC             Imaging Results (Last 24 Hours)       ** No results found for the last 24 hours. **               ASSESSMENT AND PLAN:      Principal Problem:    Food impaction of esophagus       Plan EGD for evaluation and management.    I discussed the patients findings and my recommendations with the patient.  Shakir Ferreira MD  03/27/24  09:10 EDT

## 2024-03-27 NOTE — H&P
"[facility] H&P    Patient Name: Heevr Guallpa  : 1935  MRN: [id number]  Primary Care Physician: Maddie Martinez MD  Date of admission: 3/26/2024     Patient Care Team:  Maddie Martinez MD as PCP - General (Family Medicine)  Jayy Kaur MD as Consulting Physician (Urology)  Renae Lucio MD as Consulting Physician (Dermatology)          Subjective   History Present Illness     Chief Complaint:   Chief Complaint   Patient presents with    food bolus         History of Present Illness  Obtained from ED provider HPI on 3/26/2024:  Patient is an 88-year-old male presents emergency department with complaint of a food bolus.  He reports he was eating potatoes tonight, and they are second throat.  He does have a history of stricture and esophageal flexion past     24:  Patient confirms the HPI noted above including eating some \"overdone potatoes\" at approximately 1730 on the day of presentation and subsequently noting a sensation as if they were \"stuck in his throat\".  He continues to report some discomfort as well as pain as well as cough though denies any dyspnea associated with the symptoms throughout the night though this has improved to some degree.  He does still have some cough with no current nausea.  Patient had admitted to some feelings of depression on initial admission screening and psychiatry was consulted.  He does confirm that he lost his wife of 60+ years approximately 4 years prior and does [place] though he remains fairly active and has good family support.  He does report some continued sadness regarding the loss of his wife and has at times thought that things would be better if he was no longer here though he confirms that he is not actively suicidal and has no plans to harm himself.    Following procedure patient attempted p.o. intake of liquids and was found to have significant dysphagia with cough and stridor noted with any " attempt at even small amounts of of liquid intake.  He was reevaluated by anesthesiology as well as GI who ordered additional imaging along with speech evaluation with speech pathologist noting persistent cough with small amounts of p.o. intake and recommended he continue n.p.o. status with plans for reevaluation the following day      ROS  Review of Systems   Constitutional: Negative.   HENT: Negative.     Eyes: Negative.    Cardiovascular: Negative.    Respiratory:  Positive for cough.    Skin: Negative.    Musculoskeletal: Negative.    Gastrointestinal:  Positive for dysphagia and nausea.   Genitourinary: Negative.    Neurological: Negative.    Psychiatric/Behavioral:  Positive for depression        Personal History     Past Medical History:   Past Medical History:   Diagnosis Date    Hyperlipidemia     Hypertension     Neck pain     Osteopenia     Sleep disorder     Thrombocytopenia     Vitamin D deficiency        Surgical History:      Past Surgical History:   Procedure Laterality Date    COLONOSCOPY      CYST REMOVAL             Family History: Family history is unknown by patient. Otherwise pertinent FHx was reviewed and unremarkable.     Social History:  reports that he quit smoking about 64 years ago. His smoking use included cigarettes. He started smoking about 68 years ago. He has never used smokeless tobacco. He reports that he does not drink alcohol and does not use drugs.      Medications:  Prior to Admission medications    Not on File       Allergies:  No Known Allergies    Objective   Objective     Vital Signs  Temp:  [97.5 °F (36.4 °C)-97.9 °F (36.6 °C)] 97.5 °F (36.4 °C)  Heart Rate:  [] 109  Resp:  [12-20] 17  BP: (106-220)/() 164/85  SpO2:  [91 %-100 %] 99 %  on   ;   Device (Oxygen Therapy): room air  Body mass index is 27.35 kg/m².    Physical Exam  Physical Exam  Vitals reviewed.   Constitutional:       General: He is not in acute distress.     Appearance: Normal appearance. He is  normal weight. He is not ill-appearing, toxic-appearing or diaphoretic.   HENT:      Head: Normocephalic.      Right Ear: External ear normal.      Left Ear: External ear normal.      Nose: Nose normal.      Mouth/Throat:      Mouth: Mucous membranes are moist.   Eyes:      Extraocular Movements: Extraocular movements intact.   Cardiovascular:      Rate and Rhythm: Normal rate and regular rhythm.      Pulses: Normal pulses.      Heart sounds: Normal heart sounds.   Pulmonary:      Effort: Pulmonary effort is normal.      Breath sounds: Normal breath sounds.   Abdominal:      General: Bowel sounds are normal.      Palpations: Abdomen is soft.      Tenderness: There is no abdominal tenderness.   Musculoskeletal:         General: Normal range of motion.      Cervical back: Normal range of motion.      Right lower leg: No edema.      Left lower leg: No edema.   Skin:     General: Skin is warm and dry.      Capillary Refill: Capillary refill takes less than 2 seconds.   Neurological:      General: No focal deficit present.      Mental Status: He is alert and oriented to person, place, and time.   Psychiatric:         Mood and Affect: Mood normal.         Behavior: Behavior normal.         Thought Content: Thought content normal.         Judgment: Judgment normal.     Results Review:  I have personally reviewed most recent lab results and agree with findings, most notably: BMP, CBC, CT of chest and neck.    Results from last 7 days   Lab Units 03/27/24  0425   WBC 10*3/mm3 8.61   HEMOGLOBIN g/dL 15.2   HEMATOCRIT % 48.3   PLATELETS 10*3/mm3 213     Results from last 7 days   Lab Units 03/27/24  0425   SODIUM mmol/L 142   POTASSIUM mmol/L 3.8   CHLORIDE mmol/L 106   CO2 mmol/L 25.0   BUN mg/dL 18   CREATININE mg/dL 1.28*   GLUCOSE mg/dL 90   CALCIUM mg/dL 9.2     Estimated Creatinine Clearance: 46 mL/min (A) (by C-G formula based on SCr of 1.28 mg/dL (H)).  Brief Urine Lab Results       None            Microbiology Results  (last 10 days)       ** No results found for the last 240 hours. **            ECG/EMG Results (most recent)       None                    CT Chest Without Contrast Diagnostic    Result Date: 3/27/2024  Impression: 1. Minimal secretions within the posterior aspect of the trachea. No evidence of bronchiolitis or evidence of aspiration pneumonia. 2. Small 4 mm pulmonary nodule within the posterior aspect of the left upper lobe. Recommend follow-up chest CT in 1 year if patient has risk factors such as smoking history. Electronically Signed: Yohan Alcala  3/27/2024 1:36 PM EDT  Workstation ID: [id number]    CT Soft Tissue Neck Without Contrast    Result Date: 3/27/2024  Impression: 1. No acute findings in the neck. 2. 4 mm left upper lobe pulmonary nodule Electronically Signed: Ant Aguilar MD  3/27/2024 1:26 PM EDT  Workstation ID: [id number]       Estimated Creatinine Clearance: 46 mL/min (A) (by C-G formula based on SCr of 1.28 mg/dL (H)).    Assessment & Plan   Assessment/Plan       Active Hospital Problems    Diagnosis  POA    **Food impaction of esophagus [T18.128A, W44.F3XA]  Yes      Resolved Hospital Problems   No resolved problems to display.     Food impaction  -CBC and CMP generally unremarkable with CKD noted addressed as below  -In the ED patient was given 1 mg glucagon  -GI consulted who performed EGD with dilation noting previous spontaneous passage of food impaction on 3/27/2024  -Start daily PPI  -Patient had significant dysphagia as well as signs of aspiration postprocedure and was reevaluated by anesthesiology as well as GI  -CT of chest showed minimal secretions within the posterior aspect of the trachea without evidence of bronchiolitis or aspiration pneumonia with a 4 mm pulmonary nodule in the posterior aspect of the left upper lobe  -CT of soft tissue of the neck showed no acute findings  -Speech therapy consulted who note patient does not appear safe for p.o. at this time recommend he remain  n.p.o. with plan to reevaluate on the following day  -Hospitalist consulted     CKD (Stage 3)        Lab Results   Component Value Date     CREATININE 1.28 (H) 03/27/2024     BUN 18 03/27/2024     BCR 14.1 03/27/2024     EGFR 53.8 (L) 03/27/2024   -Avoid nephrotoxic medication IV dye unless urgently needed  -Monitor BMP and I's and O's while admitted        Depression  -Psychiatry consulted on admission who recommended initiation of sertraline 25 mg daily            VTE Prophylaxis -   Mechanical Order History:        Ordered        03/26/24 2305  Place Sequential Compression Device  Once            03/26/24 2305  Maintain Sequential Compression Device  Continuous                          Pharmalogical Order History:       None            CODE STATUS:    Code Status and Medical Interventions:   Ordered at: 03/26/24 2228     Level Of Support Discussed With:    Patient     Code Status (Patient has no pulse and is not breathing):    CPR (Attempt to Resuscitate)     Medical Interventions (Patient has pulse or is breathing):    Full Support       This patient has been examined wearing personal protective equipment.     I discussed the patient's findings and my recommendations with patient and nursing staff.      Signature:Electronically signed by Robson Thompson PA-C, 03/27/24, 4:27 PM EDT.

## 2024-03-27 NOTE — PLAN OF CARE
"Goal Outcome Evaluation:  Clinical swallow evaluation completed on today's date. Pt is currently NPO. Pt complaint of coughing and difficulty swallowing. Pt reports \"I'm strangled\" when he swallows liquids.  He reports that these symptoms started last night 3/27/24 after eating potatoes. Pt underwent EGD this morning 3/27/24. Pt endorses odynophagia and globus sensation.     Pt agreeable to trials of ice chips and x1 tsp of thin liquid by spoon. Oral care completed prior to trials. Pt seated upright in bed at 90 degrees during trials. Pt completed x 2 trials of ice chips. Mastication time appeared WFL. Adequate labial seal noted with spoon. No anterior spillage. No cough or throat clear noted after trials of ice chips. Wet respirations/VQ noted. Pt completed x 1 trial of thin liquid by teaspoon. Pt appeared to initiate swallow. Severe coughing/coking noted after trial. Pt reporting \"I can't get anything back in\" (later explaining he couldn't catch his breath/inhale). Pt’s cough appeared strong; however non-productive, little fluid spit into emesis bag. Due to s/s of aspiration trials discontinued at this time. Discussed recommendations with pt and pt's nurse. Both in agreement.     Pt does not appear safe for PO at this time. ST recommends that pt remain NPO at this time. ST will continue to follow and re-evaluate pt's swallow clinically (and/or instrumentally if indicated) when appropriate.     "

## 2024-03-27 NOTE — ED PROVIDER NOTES
Subjective   Chief Complaint   Patient presents with    food bolus         History provided by:  Patient (Son-in-law at bedside)    Patient is an 88-year-old male presents emergency department with complaint of a food bolus.  He reports he was eating potatoes tonight, and they are second throat.  He does have a history of stricture and esophageal flexion past    Denies taking any home medications on a daily basis.  Denies any significant medical history.  Review of Systems   Constitutional:  Negative for chills and fever.   HENT:  Positive for trouble swallowing.    Gastrointestinal:  Negative for abdominal pain and vomiting.       Past Medical History:   Diagnosis Date    Hyperlipidemia     Hypertension     Neck pain     Osteopenia     Sleep disorder     Thrombocytopenia     Vitamin D deficiency        No Known Allergies    Past Surgical History:   Procedure Laterality Date    COLONOSCOPY      CYST REMOVAL         Family History   Family history unknown: Yes       Social History     Socioeconomic History    Marital status:    Tobacco Use    Smoking status: Former     Current packs/day: 0.00     Types: Cigarettes     Start date:      Quit date:      Years since quittin.2    Smokeless tobacco: Never   Vaping Use    Vaping status: Never Used   Substance and Sexual Activity    Alcohol use: No    Drug use: No    Sexual activity: Yes     Partners: Female     Birth control/protection: None           Objective   Physical Exam  Vitals and nursing note reviewed.   Constitutional:       Appearance: Normal appearance.   HENT:      Head: Normocephalic and atraumatic.      Comments: Patient starting his own secretions normally.  No active vomiting at this time     Nose: Nose normal.      Mouth/Throat:      Mouth: Mucous membranes are moist.      Pharynx: Oropharynx is clear.   Cardiovascular:      Rate and Rhythm: Normal rate and regular rhythm.      Heart sounds: Normal heart sounds. No murmur heard.     No  "friction rub.   Pulmonary:      Breath sounds: No stridor.   Abdominal:      Tenderness: There is no abdominal tenderness. There is no guarding or rebound.   Skin:     General: Skin is warm and dry.      Capillary Refill: Capillary refill takes less than 2 seconds.   Neurological:      Mental Status: He is alert and oriented to person, place, and time.         Procedures           ED Course  BP (!) 211/83 (BP Location: Left arm, Patient Position: Lying)   Pulse 70   Temp 97.6 °F (36.4 °C) (Oral)   Resp 18   Ht 172.7 cm (68\")   Wt 81.6 kg (179 lb 14.3 oz)   SpO2 100%   BMI 27.35 kg/m²   Medications   sodium chloride 0.9 % flush 10 mL (has no administration in time range)   sodium chloride 0.9 % flush 10 mL ( Intravenous Canceled Entry 3/26/24 2335)   sodium chloride 0.9 % flush 10 mL (has no administration in time range)   sodium chloride 0.9 % infusion 40 mL (has no administration in time range)   acetaminophen (TYLENOL) tablet 650 mg (has no administration in time range)   ondansetron (ZOFRAN) injection 4 mg (has no administration in time range)   melatonin tablet 5 mg (has no administration in time range)   sennosides-docusate (PERICOLACE) 8.6-50 MG per tablet 2 tablet (has no administration in time range)     And   polyethylene glycol (MIRALAX) packet 17 g (has no administration in time range)     And   bisacodyl (DULCOLAX) EC tablet 5 mg (has no administration in time range)     And   bisacodyl (DULCOLAX) suppository 10 mg (has no administration in time range)   glucagon (GLUCAGEN) injection 1 mg (1 mg Intravenous Given 3/26/24 2204)     No radiology results for the last day  Lab Results (last 24 hours)       ** No results found for the last 24 hours. **            ED Course as of 03/27/24 0042   Tue Mar 26, 2024   2213 No improvement with glucagon. Will consult GI [LB]   2220 Discussed with GI [LB]      ED Course User Index  [LB] Katarzyna Saunders, LIGIA                                         "     Medical Decision Making  Patient is an 88-year-old male presents emergency department for evaluation of a food impaction.  He reports he is have esophageal strictures previously and had to have his esophagus dilated.  He reports he ate potatoes tonight and then experience difficulty swallowing.  He was given glucagon here in the ED without relief.  He is not having any respiratory distress.  No active vomiting.  I consulted with gastroenterology, spoke with Dr. Douglas.  Patient will be seen tomorrow.  I will place patient in ED observation.  N.p.o. after midnight.    He does have an elevated blood pressure.  He reports he is extremely anxious, would like to monitor his blood pressure and not have any medications at this time.  His family agrees.  He is asymptomatic and feels as appropriate.    Problems Addressed:  Food impaction of esophagus, initial encounter: complicated acute illness or injury    Amount and/or Complexity of Data Reviewed  ECG/medicine tests: ordered.    Risk  OTC drugs.  Prescription drug management.  Decision regarding hospitalization.        Final diagnoses:   Food impaction of esophagus, initial encounter       ED Disposition  ED Disposition       ED Disposition   Decision to Admit    Condition   --    Comment   --               No follow-up provider specified.       Medication List      No changes were made to your prescriptions during this visit.            Katarzyna Saunders, APRN  03/27/24 0042

## 2024-03-27 NOTE — ANESTHESIA PROCEDURE NOTES
Airway  Urgency: elective    Date/Time: 3/27/2024 9:12 AM  Airway not difficult    General Information and Staff    Patient location during procedure: OR    Indications and Patient Condition  Indications for airway management: airway protection    Preoxygenated: yes  MILS not maintained throughout  Mask difficulty assessment: 0 - not attempted    Final Airway Details  Final airway type: endotracheal airway      Successful airway: ETT     Successful intubation technique: video laryngoscopy  Facilitating devices/methods: intubating stylet  Endotracheal tube insertion site: oral  Blade: Soler  Blade size: 3  ETT size (mm): 8.0  Cormack-Lehane Classification: grade I - full view of glottis  Placement verified by: chest auscultation and capnometry   Measured from: lips  ETT/EBT  to lips (cm): 22  Number of attempts at approach: 1  Assessment: lips, teeth, and gum same as pre-op and atraumatic intubation

## 2024-03-27 NOTE — ANESTHESIA POSTPROCEDURE EVALUATION
"Patient: Hever Guallpa    Procedure Summary       Date: 03/27/24 Room / Location: [facility] ENDOSCOPY 2 / [facility] ENDOSCOPY    Anesthesia Start: 0859 Anesthesia Stop: 0929    Procedure: ESOPHAGOGASTRODUODENOSCOPY with non-guided esophageal dilation using 52 Fr. Bougie's Diagnosis:       Food impaction of esophagus, initial encounter      (Food impaction of esophagus, initial encounter [T18.128A, W44.F3XA])    Surgeons: Shakir Ferreira MD Provider: Taqueria Lowe MD    Anesthesia Type: general ASA Status: 3            Anesthesia Type: general    Vitals  Vitals Value Taken Time   /70 03/27/24 1005   Temp 97.7 °F (36.5 °C) 03/27/24 0935   Pulse 85 03/27/24 1020   Resp 15 03/27/24 0950   SpO2 99 % 03/27/24 1020   Vitals shown include unfiled device data.        Post Anesthesia Care and Evaluation    Patient location during evaluation: PACU  Patient participation: complete - patient participated  Level of consciousness: awake  Pain scale: See nurse's notes for pain score.  Pain management: adequate    Airway patency: patent  Anesthetic complications: No anesthetic complications  PONV Status: none  Cardiovascular status: acceptable  Respiratory status: acceptable and spontaneous ventilation  Hydration status: acceptable    Comments: Patient seen and examined postoperatively; vital signs stable; SpO2 greater than or equal to 90%; cardiopulmonary status stable; nausea/vomiting adequately controlled; pain adequately controlled; no apparent anesthesia complications; patient discharged from anesthesia care when discharge criteria were met    Called to bedside to evaluate for \"stridor\".  Pt sats 97% on RA.  No respiratory distress.   Vss.  However, he was observed to have a severe gagging and choking episode when he tried to have a sip of water.  ? Esophageal spasm?? Anesthesia record reviewed ->uneventful.  Discussed with GI who will evaluate pt.     " Detail Level: Detailed

## 2024-03-27 NOTE — PROGRESS NOTES
GI service was notified that the patient was having coughing and stridor with sips of water s/p EGD.  Patient denies any chest pain or abdominal pain.  No shortness of breath at rest.  Denies odynophagia.  He states that it does not feel as if food is lodged in his esophagus.  He now reports that difficulty with swallowing seems to occur in the neck region.  We will proceed with CT of the neck and chest for further evaluation.  We will also consult speech therapy for bedside evaluation.  Patient was instructed to remain n.p.o. at this time until evaluated by speech therapy.    SHARON Herrera

## 2024-03-28 ENCOUNTER — APPOINTMENT (OUTPATIENT)
Dept: MRI IMAGING | Facility: HOSPITAL | Age: 89
DRG: 393 | End: 2024-03-28
Payer: MEDICARE

## 2024-03-28 ENCOUNTER — INPATIENT HOSPITAL (AMBULATORY)
Dept: URBAN - METROPOLITAN AREA HOSPITAL 84 | Facility: HOSPITAL | Age: 89
End: 2024-03-28

## 2024-03-28 ENCOUNTER — APPOINTMENT (OUTPATIENT)
Dept: GENERAL RADIOLOGY | Facility: HOSPITAL | Age: 89
DRG: 393 | End: 2024-03-28
Payer: MEDICARE

## 2024-03-28 DIAGNOSIS — R07.0 PAIN IN THROAT: ICD-10-CM

## 2024-03-28 DIAGNOSIS — T18.128A FOOD IN ESOPHAGUS CAUSING OTHER INJURY, INITIAL ENCOUNTER: ICD-10-CM

## 2024-03-28 DIAGNOSIS — R13.10 DYSPHAGIA, UNSPECIFIED: ICD-10-CM

## 2024-03-28 LAB
ANION GAP SERPL CALCULATED.3IONS-SCNC: 10 MMOL/L (ref 5–15)
BASOPHILS # BLD AUTO: 0.04 10*3/MM3 (ref 0–0.2)
BASOPHILS NFR BLD AUTO: 0.3 % (ref 0–1.5)
BUN SERPL-MCNC: 18 MG/DL (ref 8–23)
BUN/CREAT SERPL: 12.5 (ref 7–25)
CALCIUM SPEC-SCNC: 9.3 MG/DL (ref 8.6–10.5)
CHLORIDE SERPL-SCNC: 105 MMOL/L (ref 98–107)
CHOLEST SERPL-MCNC: 178 MG/DL (ref 0–200)
CO2 SERPL-SCNC: 27 MMOL/L (ref 22–29)
CREAT SERPL-MCNC: 1.44 MG/DL (ref 0.76–1.27)
DEPRECATED RDW RBC AUTO: 40 FL (ref 37–54)
EGFRCR SERPLBLD CKD-EPI 2021: 46.7 ML/MIN/1.73
EOSINOPHIL # BLD AUTO: 0 10*3/MM3 (ref 0–0.4)
EOSINOPHIL NFR BLD AUTO: 0 % (ref 0.3–6.2)
ERYTHROCYTE [DISTWIDTH] IN BLOOD BY AUTOMATED COUNT: 12.5 % (ref 12.3–15.4)
FOLATE SERPL-MCNC: 11.7 NG/ML (ref 4.78–24.2)
GLUCOSE SERPL-MCNC: 95 MG/DL (ref 65–99)
HBA1C MFR BLD: 5.6 % (ref 4.8–5.6)
HCT VFR BLD AUTO: 50.1 % (ref 37.5–51)
HDLC SERPL-MCNC: 51 MG/DL (ref 40–60)
HGB BLD-MCNC: 15.7 G/DL (ref 13–17.7)
IMM GRANULOCYTES # BLD AUTO: 0.05 10*3/MM3 (ref 0–0.05)
IMM GRANULOCYTES NFR BLD AUTO: 0.4 % (ref 0–0.5)
LDLC SERPL CALC-MCNC: 116 MG/DL (ref 0–100)
LDLC/HDLC SERPL: 2.27 {RATIO}
LYMPHOCYTES # BLD AUTO: 0.45 10*3/MM3 (ref 0.7–3.1)
LYMPHOCYTES NFR BLD AUTO: 3.2 % (ref 19.6–45.3)
MCH RBC QN AUTO: 27.6 PG (ref 26.6–33)
MCHC RBC AUTO-ENTMCNC: 31.3 G/DL (ref 31.5–35.7)
MCV RBC AUTO: 88 FL (ref 79–97)
MONOCYTES # BLD AUTO: 0.92 10*3/MM3 (ref 0.1–0.9)
MONOCYTES NFR BLD AUTO: 6.6 % (ref 5–12)
NEUTROPHILS NFR BLD AUTO: 12.57 10*3/MM3 (ref 1.7–7)
NEUTROPHILS NFR BLD AUTO: 89.5 % (ref 42.7–76)
NRBC BLD AUTO-RTO: 0 /100 WBC (ref 0–0.2)
PLATELET # BLD AUTO: 234 10*3/MM3 (ref 140–450)
PMV BLD AUTO: 9.8 FL (ref 6–12)
POTASSIUM SERPL-SCNC: 4.2 MMOL/L (ref 3.5–5.2)
RBC # BLD AUTO: 5.69 10*6/MM3 (ref 4.14–5.8)
SODIUM SERPL-SCNC: 142 MMOL/L (ref 136–145)
T4 FREE SERPL-MCNC: 1.02 NG/DL (ref 0.93–1.7)
TRIGL SERPL-MCNC: 55 MG/DL (ref 0–150)
TSH SERPL DL<=0.05 MIU/L-ACNC: 2.77 UIU/ML (ref 0.27–4.2)
VIT B12 BLD-MCNC: 172 PG/ML (ref 211–946)
VLDLC SERPL-MCNC: 11 MG/DL (ref 5–40)
WBC NRBC COR # BLD AUTO: 14.03 10*3/MM3 (ref 3.4–10.8)

## 2024-03-28 PROCEDURE — 84443 ASSAY THYROID STIM HORMONE: CPT | Performed by: PSYCHIATRY & NEUROLOGY

## 2024-03-28 PROCEDURE — 97162 PT EVAL MOD COMPLEX 30 MIN: CPT

## 2024-03-28 PROCEDURE — 82607 VITAMIN B-12: CPT | Performed by: PSYCHIATRY & NEUROLOGY

## 2024-03-28 PROCEDURE — 92611 MOTION FLUOROSCOPY/SWALLOW: CPT

## 2024-03-28 PROCEDURE — 70551 MRI BRAIN STEM W/O DYE: CPT

## 2024-03-28 PROCEDURE — 74230 X-RAY XM SWLNG FUNCJ C+: CPT

## 2024-03-28 PROCEDURE — 25010000002 GADOTERIDOL PER 1 ML: Performed by: INTERNAL MEDICINE

## 2024-03-28 PROCEDURE — A9579 GAD-BASE MR CONTRAST NOS,1ML: HCPCS | Performed by: INTERNAL MEDICINE

## 2024-03-28 PROCEDURE — 85025 COMPLETE CBC W/AUTO DIFF WBC: CPT | Performed by: INTERNAL MEDICINE

## 2024-03-28 PROCEDURE — 99221 1ST HOSP IP/OBS SF/LOW 40: CPT | Performed by: PSYCHIATRY & NEUROLOGY

## 2024-03-28 PROCEDURE — 72156 MRI NECK SPINE W/O & W/DYE: CPT

## 2024-03-28 PROCEDURE — 82746 ASSAY OF FOLIC ACID SERUM: CPT | Performed by: PSYCHIATRY & NEUROLOGY

## 2024-03-28 PROCEDURE — 92526 ORAL FUNCTION THERAPY: CPT

## 2024-03-28 PROCEDURE — 70200 X-RAY EXAM OF EYE SOCKETS: CPT

## 2024-03-28 PROCEDURE — 80048 BASIC METABOLIC PNL TOTAL CA: CPT | Performed by: INTERNAL MEDICINE

## 2024-03-28 PROCEDURE — 99232 SBSQ HOSP IP/OBS MODERATE 35: CPT | Mod: FS | Performed by: NURSE PRACTITIONER

## 2024-03-28 PROCEDURE — 99232 SBSQ HOSP IP/OBS MODERATE 35: CPT

## 2024-03-28 PROCEDURE — 83036 HEMOGLOBIN GLYCOSYLATED A1C: CPT | Performed by: PSYCHIATRY & NEUROLOGY

## 2024-03-28 PROCEDURE — 25010000002 AMPICILLIN-SULBACTAM PER 1.5 G: Performed by: INTERNAL MEDICINE

## 2024-03-28 PROCEDURE — 84439 ASSAY OF FREE THYROXINE: CPT | Performed by: PSYCHIATRY & NEUROLOGY

## 2024-03-28 PROCEDURE — 80061 LIPID PANEL: CPT | Performed by: PSYCHIATRY & NEUROLOGY

## 2024-03-28 RX ORDER — CODEINE PHOSPHATE/GUAIFENESIN 10-100MG/5
10 LIQUID (ML) ORAL 3 TIMES DAILY
Status: DISCONTINUED | OUTPATIENT
Start: 2024-03-28 | End: 2024-03-31 | Stop reason: HOSPADM

## 2024-03-28 RX ADMIN — AMPICILLIN SODIUM AND SULBACTAM SODIUM 3 G: 2; 1 INJECTION, POWDER, FOR SOLUTION INTRAMUSCULAR; INTRAVENOUS at 22:36

## 2024-03-28 RX ADMIN — BARIUM SULFATE 50 ML: 400 SUSPENSION ORAL at 11:07

## 2024-03-28 RX ADMIN — Medication 10 ML: at 21:50

## 2024-03-28 RX ADMIN — Medication 10 ML: at 12:12

## 2024-03-28 RX ADMIN — GADOTERIDOL 16 ML: 279.3 INJECTION, SOLUTION INTRAVENOUS at 17:03

## 2024-03-28 RX ADMIN — AMPICILLIN SODIUM AND SULBACTAM SODIUM 3 G: 2; 1 INJECTION, POWDER, FOR SOLUTION INTRAMUSCULAR; INTRAVENOUS at 17:30

## 2024-03-28 RX ADMIN — AMPICILLIN SODIUM AND SULBACTAM SODIUM 3 G: 2; 1 INJECTION, POWDER, FOR SOLUTION INTRAMUSCULAR; INTRAVENOUS at 11:38

## 2024-03-28 NOTE — PLAN OF CARE
Goal Outcome Evaluation:      Video fluorocopic swallow study was conducted in the lateral position with patient seated upright. Patient was fed by staff. Trials provided are as follows: nectar thick via spoon x 1, thins via spoon x 1. Oral stage presented as reduce BOT as patient exhibited premature spillage. Pharyngeal stage was significant for reduce hyo-laryngeal excursion, no epiglottic deflection and incomplete laryngeal vestibular closure. Moderate residue noted both in the valleculae and pyriform with the inability to clear  residue d/t non functional swallow with both trials of nectar and thin. Patient demonstrated deep penetration with nectar that did not clear from incomplete posterior laryngeal  vestibular closure. This represent on the Rosenbek penetration-aspiration scale of a 5. With thin liquid patient exhibits aspiration from the pharyngeal residue after the swallow. This represent on the Rosenbek penetration-aspiration scale as a 7. Overall, patient had a non functional swallow. It is recommended that patient remain NPO at this time. It is recommended that patient receive bypass feeding for primary nourishment at this time. ST will follow for pharyngeal strengthening exercises to improve ability to swallow more effectively.                          SLP Swallowing Diagnosis: severe, pharyngeal dysphagia (03/28/24 1100)

## 2024-03-28 NOTE — PLAN OF CARE
Goal Outcome Evaluation:  Plan of Care Reviewed With: patient           Outcome Evaluation: 87 yo male admitted with feeling of food stuck in esophagus 3/26.  Pt reports he burned potatoes on the stove and then ate some and the food got stuck.  Patient underwent EGD yesterday which showed that food impaction had spontaneously passed.   Following the procedure, the patient reported that he was unable to swallow.  Bedside RN reported coughing and stridor with attempts at drinking water.   Pt has failed swallow study with SLP.   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  This date, pt supine in bed asleep, reports he is very tired.  Noted L eyelid only half open at rest and mild L facial droop.  Sitting EOB, pt with noted L side LOB and impaired dynamic sitting balance.  Standing, pt ataxic and required mod A, unsafe to attempt ambulating with A x1.  RN alerted and reports neurology has been consulted but has not yet seen pt, charge RN also aware of changes as RN reports this AM pt was up on his own putting on his pants.  Neurologist arrived on unit and evaluated pt, he ordered MRI of brain and cervical spine.  Pt left in bed with bed alarm activated and nsg staff aware pt high risk for falls.  Will follow pt 5x/week and recommend OT eval.  At this time, recommend IP rehab at d/c.    Addendum:  pt also with dysarthric speech with very wet vocal quality.    Anticipated Discharge Disposition (PT): inpatient rehabilitation facility

## 2024-03-28 NOTE — PROGRESS NOTES
"[facility] MEDICINE SERVICE  DAILY PROGRESS NOTE    NAME: Hever Guallpa  : 1935  MRN: [id number]      LOS: 1 day     PROVIDER OF SERVICE: Jamie Padron MD    Chief Complaint: Food impaction of esophagus    Subjective:     Interval History:  History taken from: patient    History of Present Illness:   Per the documentation by Shakir Ferreira MD , dated 2024,  \"This is an 88-year-old male who presents with acute food impaction. He states he was eating fried potatoes yesterday at 5 PM and that the food got stuck. He is unable to swallow secretions. He does feel a sense of relief this morning and wonders if the food bolus is passed but he is not sure. He reports a recent history of dysphagia and odynophagia. He has a history of reflux symptoms in the past but none recently. No unintentional weight loss. No blood thinners. Last EGD  hiatal hernia GE junction ring and UES stricture status post dilation 54 Cuban. \"     3/28/24 seen in bed NAD, says he didn't sleep well last night, awaiting speech eval. KORY RN    Review of Systems  was done and negative except as in the note   Objective:     Vital Signs  Temp:  [97.5 °F (36.4 °C)-98.2 °F (36.8 °C)] 98.2 °F (36.8 °C)  Heart Rate:  [] 98  Resp:  [12-18] 18  BP: (106-175)/(58-87) 134/63   Body mass index is 27.35 kg/m².      Physical Exam  CVS: S1/S2 present, RRR, no M/R/G  Lungs: Clear bilaterally.  No additional sounds  Abdomen: Soft nontender positive bowel sounds  Ext: No lower extremity edema  Skin: No rash or lesions  Neuro: no focal deficits  Psych: Mood is normal  Scheduled Meds   sertraline, 25 mg, Oral, Daily  sodium chloride, 10 mL, Intravenous, Q12H       PRN Meds     acetaminophen    senna-docusate sodium **AND** polyethylene glycol **AND** bisacodyl **AND** bisacodyl    melatonin    ondansetron ODT **OR** ondansetron    [COMPLETED] Insert Peripheral IV **AND** sodium chloride    sodium chloride    sodium chloride "   Infusions  lactated ringers, 75 mL/hr, Last Rate: 75 mL/hr (03/27/24 2036)          Diagnostic Data    Results from last 7 days   Lab Units 03/28/24  0439   WBC 10*3/mm3 14.03*   HEMOGLOBIN g/dL 15.7   HEMATOCRIT % 50.1   PLATELETS 10*3/mm3 234   GLUCOSE mg/dL 95   CREATININE mg/dL 1.44*   BUN mg/dL 18   SODIUM mmol/L 142   POTASSIUM mmol/L 4.2   ANION GAP mmol/L 10.0       CT Chest Without Contrast Diagnostic    Result Date: 3/27/2024  Impression: 1. Minimal secretions within the posterior aspect of the trachea. No evidence of bronchiolitis or evidence of aspiration pneumonia. 2. Small 4 mm pulmonary nodule within the posterior aspect of the left upper lobe. Recommend follow-up chest CT in 1 year if patient has risk factors such as smoking history. Electronically Signed: Yohan Alcala  3/27/2024 1:36 PM EDT  Workstation ID: [id number]    CT Soft Tissue Neck Without Contrast    Result Date: 3/27/2024  Impression: 1. No acute findings in the neck. 2. 4 mm left upper lobe pulmonary nodule Electronically Signed: Ant Aguilar MD  3/27/2024 1:26 PM EDT  Workstation ID: [id number]       I reviewed the patient's new clinical results.    Assessment/Plan:     Active and Resolved Problems  Active Hospital Problems    Diagnosis  POA    **Food impaction of esophagus [T18.128A, W44.F3XA]  Yes    Dysphagia [R13.10]  Yes      Resolved Hospital Problems   No resolved problems to display.     Food impaction of the esophagus  The patient was evaluated by EGD by GI on 3/27/2023  Impression:  Food impaction status post spontaneous passage  History of dysphagia and odynophagia status post 52 [place]jas dilation  Gastric erosion  The patient was later reporting coughing and stridor with sips of water status post EGD.  The patient is having difficulty with swallowing in the neck area.  CT of the neck and chest were ordered with no acute findings in the neck and 4 mm left upper lobe pulmonary nodule  1. Minimal secretions within the  posterior aspect of the trachea. No evidence of bronchiolitis or evidence of aspiration pneumonia.  2. Small 4 mm pulmonary nodule within the posterior aspect of the left upper lobe. Recommend follow-up chest CT in 1 year if patient has risk factors such as smoking history.     The patient was evaluated by speech therapy and recommended n.p.o. at this time and they will continue to follow and reevaluate the patient swallow   speech to evaluate      Major depression  The patient was evaluated by psychiatry and started on sertraline 25 p.o. daily    DVT prophylaxis:  Mechanical DVT prophylaxis orders are present.         Code status is   Code Status and Medical Interventions:   Ordered at: 03/26/24 3697     Level Of Support Discussed With:    Patient     Code Status (Patient has no pulse and is not breathing):    CPR (Attempt to Resuscitate)     Medical Interventions (Patient has pulse or is breathing):    Full Support       Plan for disposition:home in 1 days    Time: 30 minutes    Signature: Electronically signed by Jamie Padron MD, 03/28/24, 08:07 EDT.  [facility] Hospitalist Team

## 2024-03-28 NOTE — THERAPY EVALUATION
Patient Name: Hever Guallpa  : 1935    MRN: [id number]                              Today's Date: 3/28/2024       Admit Date: 3/26/2024    Visit Dx:     ICD-10-CM ICD-9-CM   1. Food impaction of esophagus, initial encounter  T18.128A 935.1    [id number].F3XA      Patient Active Problem List   Diagnosis    Neck pain    Body mass index (BMI) of 28.0-28.9 in adult    Altered bowel function    Family history of malignant neoplasm of colon    History of malignant neoplasm of prostate    Hx of radiation therapy    Hyperlipidemia    Hypertension    Melanocytic nevus    Osteopenia    Sleep disorder    Thrombocytopenia    Vitamin D deficiency    Food impaction of esophagus    Dysphagia     Past Medical History:   Diagnosis Date    Hyperlipidemia     Hypertension     Neck pain     Osteopenia     Sleep disorder     Thrombocytopenia     Vitamin D deficiency      Past Surgical History:   Procedure Laterality Date    COLONOSCOPY      CYST REMOVAL        General Information       Row Name 24 1437          Physical Therapy Time and Intention    Document Type evaluation  -     Mode of Treatment physical therapy  -       Row Name 24 1437          General Information    Patient Profile Reviewed yes  -     Prior Level of Function independent:;ADL's;all household mobility;community mobility  tends to [place]  -     Existing Precautions/Restrictions fall;NPO  -     Barriers to Rehab hearing deficit  -       Row Name 24 1437          Living Environment    People in Home alone  -       Row Name 24 1437          Cognition    Orientation Status (Cognition) oriented x 4  -       Row Name 24 1437          Safety Issues, Functional Mobility    Safety Issues Affecting Function (Mobility) insight into deficits/self-awareness  -     Impairments Affecting Function (Mobility) balance;coordination;motor control;motor planning;pain;postural/trunk control  -               User Key  (r) = Recorded  By, (t) = Taken By, (c) = Cosigned By      Initials Name Provider Type     Yokasta Boone, PT Physical Therapist                   Mobility       Row Name 03/28/24 1438          Bed Mobility    Bed Mobility bed mobility (all) activities  -     All Activities, [place] (Bed Mobility) contact guard;minimum assist (75% patient effort)  -     Comment, (Bed Mobility) pt falls to R side in sitting, sit to supine x 3 throughout evaluation as pt needing to lay down to rest at times  -JH       Row Name 03/28/24 1438          Sit-Stand Transfer    Sit-Stand [place] (Transfers) moderate assist (50% patient effort)  -     Comment, (Sit-Stand Transfer) stood pt x 2 at EOB, pt ataxic when attemping to side step to HOB, very unsteady and not safe to attempt walking  -JH       Row Name 03/28/24 1438          Gait/Stairs (Locomotion)    [place] Level (Gait) not tested  -               User Key  (r) = Recorded By, (t) = Taken By, (c) = Cosigned By      Initials Name Provider Type     Yokasta Boone PT Physical Therapist                   Obj/Interventions       Row Name 03/28/24 1439          Range of Motion Comprehensive    General Range of Motion no range of motion deficits identified  -JH       Row Name 03/28/24 1439          Strength Comprehensive (MMT)    Comment, General Manual Muscle Testing (MMT) Assessment strength equal BUE/LEs, c/o lateral R knee/thigh pain with movement.  -JH       Row Name 03/28/24 1439          Motor Skills    Motor Skills neuro-muscular function  -     Neuromuscular Function ataxia;left;moderate impairment  in standing.  also with L eye half closed in resting position, can fully open eye with cues but still a lag compared to R eye.  slight L facial droop observed.  -JH       Row Name 03/28/24 1439          Balance    Balance Assessment sitting static balance;sitting dynamic balance;standing static balance;standing dynamic balance;sit to stand dynamic balance  -     Static  Sitting Balance supervision  -     Dynamic Sitting Balance contact guard;minimal assist  -     Position, Sitting Balance unsupported;sitting edge of bed  several LOB to L side with attempted activity, on one occasion pt laid all the way back on the bed falling to R side and required min A to correct  -     Sit to Stand Dynamic Balance moderate assist  -     Static Standing Balance minimal assist;moderate assist  -     Dynamic Standing Balance moderate assist  -     Position/Device Used, Standing Balance supported  standing at EOB with hand held assist  -     Comment, Balance pt very unsafe in standing, ataxic LLE with scissoring and LOB requiring mod A to regain when attempt to side step up to head of bed  -       Row Name 03/28/24 1434          Sensory Assessment (Somatosensory)    Sensory Assessment (Somatosensory) sensation intact  -               User Key  (r) = Recorded By, (t) = Taken By, (c) = Cosigned By      Initials Name Provider Type     Yokasta Boone, PT Physical Therapist                   Goals/Plan       [facility] Name 03/28/24 1454          Bed Mobility Goal 1 (PT)    Activity/Assistive Device (Bed Mobility Goal 1, PT) bed mobility activities, all  -     [place] Level/Cues Needed (Bed Mobility Goal 1, PT) independent  -     Time Frame (Bed Mobility Goal 1, PT) 2 weeks  -[facility] Name 03/28/24 1452          Transfer Goal 1 (PT)    Activity/Assistive Device (Transfer Goal 1, PT) transfers, all  -     [place] Level/Cues Needed (Transfer Goal 1, PT) standby assist  -     Time Frame (Transfer Goal 1, PT) 2 weeks  -       Row Name 03/28/24 1459          Gait Training Goal 1 (PT)    Activity/Assistive Device (Gait Training Goal 1, PT) gait (walking locomotion);assistive device use;walker, rolling  -     [place] Level (Gait Training Goal 1, PT) minimum assist (75% or more patient effort)  -     Distance (Gait Training Goal 1, PT) 100'  -     Time Frame (Gait  Training Goal 1, PT) 2 weeks  -       Row Name 03/28/24 1458          Therapy Assessment/Plan (PT)    Planned Therapy Interventions (PT) balance training;bed mobility training;gait training;transfer training;strengthening;patient/family education;stair training;neuromuscular re-education;motor coordination training;postural re-education  -               User Key  (r) = Recorded By, (t) = Taken By, (c) = Cosigned By      Initials Name Provider Type     Yokasta Boone, PT Physical Therapist                   Clinical Impression       Row Name 03/28/24 1443          Pain    Additional Documentation Pain Scale: FACES Pre/Post-Treatment (Group)  -       Row Name 03/28/24 1443          Pain Scale: FACES Pre/Post-Treatment    Pain: FACES Scale, Pretreatment 2-->hurts little bit  -     Posttreatment Pain Rating 4-->hurts little more  -     Pain Location - Side/Orientation Right  -     Pain Location - knee  -       Row Name 03/28/24 1443          Plan of Care Review    Plan of Care Reviewed With patient  -     Outcome Evaluation 89 yo male admitted with feeling of food stuck in esophagus 3/26.  Pt reports he burned potatoes on the stove and then ate some and the food got stuck.  Patient underwent EGD yesterday which showed that food impaction had spontaneously passed.   Following the procedure, the patient reported that he was unable to swallow.  Bedside RN reported coughing and stridor with attempts at drinking water.   Pt has failed swallow study with SLP.   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  This date, pt supine in bed asleep, reports he is very tired.  Noted L eyelid only half open at rest and mild L facial droop.  Sitting EOB, pt with noted L side LOB and impaired dynamic sitting balance.  Standing, pt ataxic and required mod A, unsafe to attempt ambulating with A x1.  RN alerted and reports neurology has been consulted but has not yet seen pt, charge RN also  aware of changes as RN reports this AM pt was up on his own putting on his pants.  Neurologist arrived on unit and evaluated pt, he ordered MRI of brain and cervical spine.  Pt left in bed with bed alarm activated and nsg staff aware pt high risk for falls.  Will follow pt 5x/week and recommend OT eval.  At this time, recommend IP rehab at d/c.  -       Row Name 03/28/24 1443          Therapy Assessment/Plan (PT)    Rehab Potential (PT) good, to achieve stated therapy goals  -     Criteria for Skilled Interventions Met (PT) yes;skilled treatment is necessary  -     Therapy Frequency (PT) 5 times/wk  -       Row Name 03/28/24 1443          Vital Signs    Post Systolic BP Rehab 150  -     Post Treatment Diastolic BP 71  -     Posttreatment Heart Rate (beats/min) 88  -     O2 Delivery Intra Treatment room air  -     Post SpO2 (%) 89  -[facility] Name 03/28/24 1443          Positioning and Restraints    Pre-Treatment Position in bed  -     Post Treatment Position bed  -     In Bed notified nsg;call light within reach;encouraged to call for assist;exit alarm on  -               User Key  (r) = Recorded By, (t) = Taken By, (c) = Cosigned By      Initials Name Provider Type     Yokasta Boone, PT Physical Therapist                   Outcome Measures       Row Name 03/28/24 1459 03/28/24 0800       How much help from another person do you currently need...    Turning from your back to your side while in flat bed without using bedrails? 4  - 4  -AT    Moving from lying on back to sitting on the side of a flat bed without bedrails? 3  - 4  -AT    Moving to and from a bed to a chair (including a wheelchair)? 2  - 4  -AT    Standing up from a chair using your arms (e.g., wheelchair, bedside chair)? 2  - 4  -AT    Climbing 3-5 steps with a railing? 1  - 4  -AT    To walk in hospital room? 2  - 4  -AT    AM-PAC 6 Clicks Score (PT) 14  - 24  -AT    Highest Level of Mobility Goal 4 -->  Transfer to chair/commode  - 8 --> Walked 250 feet or more  -AT      Row Name 03/28/24 1459          Modified [place] Scale    Modified [place] Scale 4 - Moderately severe disability.  Unable to walk without assistance, and unable to attend to own bodily needs without assistance.  -       Row Name 03/28/24 1459          Functional Assessment    Outcome Measure Options AM-PAC 6 Clicks Basic Mobility (PT);Modified [place]  -               User Key  (r) = Recorded By, (t) = Taken By, (c) = Cosigned By      Initials Name Provider Type    Yokasta Paz, JEFF Physical Therapist    AT Elder Crook RN Registered Nurse                                 Physical Therapy Education       Title: PT OT SLP Therapies (Done)       Topic: Physical Therapy (Done)       Point: Mobility training (Done)       Learning Progress Summary             Patient Acceptance, E,TB, VU by  at 3/28/2024 1459                                         User Key       Initials Effective Dates Name Provider Type Discipline     06/16/21 -  Yokasta Boone, PT Physical Therapist PT                  PT Recommendation and Plan  Planned Therapy Interventions (PT): balance training, bed mobility training, gait training, transfer training, strengthening, patient/family education, stair training, neuromuscular re-education, motor coordination training, postural re-education  Plan of Care Reviewed With: patient  Outcome Evaluation: 89 yo male admitted with feeling of food stuck in esophagus 3/26.  Pt reports he burned potatoes on the stove and then ate some and the food got stuck.  Patient underwent EGD yesterday which showed that food impaction had spontaneously passed.   Following the procedure, the patient reported that he was unable to swallow.  Bedside RN reported coughing and stridor with attempts at drinking water.   Pt has failed swallow study with SLP.   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  This date, pt  supine in bed asleep, reports he is very tired.  Noted L eyelid only half open at rest and mild L facial droop.  Sitting EOB, pt with noted L side LOB and impaired dynamic sitting balance.  Standing, pt ataxic and required mod A, unsafe to attempt ambulating with A x1.  RN alerted and reports neurology has been consulted but has not yet seen pt, charge RN also aware of changes as RN reports this AM pt was up on his own putting on his pants.  Neurologist arrived on unit and evaluated pt, he ordered MRI of brain and cervical spine.  Pt left in bed with bed alarm activated and nsg staff aware pt high risk for falls.  Will follow pt 5x/week and recommend OT eval.  At this time, recommend IP rehab at d/c.     Time Calculation:         PT Charges       Row Name 03/28/24 1500             Time Calculation    Start Time 1315  -      Stop Time 1349  -      Time Calculation (min) 34 min  -      PT Received On 03/28/24  -      PT - Next Appointment 03/29/24  -      PT Goal Re-Cert Due Date 04/11/24  -         Time Calculation- PT    Total Timed Code Minutes- PT 0 minute(s)  -                User Key  (r) = Recorded By, (t) = Taken By, (c) = Cosigned By      Initials Name Provider Type    Yokasta Paz, PT Physical Therapist                  Therapy Charges for Today       Code Description Service Date Service Provider Modifiers Qty    [id number] HC-PT EVAL MOD COMPLEXITY 5 3/28/2024 Yokasta Boone, PT  1            PT G-Codes  Outcome Measure Options: AM-PAC 6 Clicks Basic Mobility (PT), Modified [place]  AM-PAC 6 Clicks Score (PT): 14  Modified [place] Scale: 4 - Moderately severe disability.  Unable to walk without assistance, and unable to attend to own bodily needs without assistance.  PT Discharge Summary  Anticipated Discharge Disposition (PT): inpatient rehabilitation facility    Yokasta Boone PT  3/28/2024

## 2024-03-28 NOTE — PLAN OF CARE
Problem: Adult Inpatient Plan of Care  Goal: Plan of Care Review  Outcome: Ongoing, Progressing  Goal: Patient-Specific Goal (Individualized)  Outcome: Ongoing, Progressing  Goal: Absence of Hospital-Acquired Illness or Injury  Outcome: Ongoing, Progressing  Intervention: Identify and Manage Fall Risk  Recent Flowsheet Documentation  Taken 3/28/2024 0027 by Emma Jaramillo RN  Safety Promotion/Fall Prevention: safety round/check completed  Taken 3/27/2024 2205 by Emma Jaramillo RN  Safety Promotion/Fall Prevention: safety round/check completed  Taken 3/27/2024 2025 by Emma Jaramillo RN  Safety Promotion/Fall Prevention: safety round/check completed  Intervention: Prevent Skin Injury  Recent Flowsheet Documentation  Taken 3/27/2024 2025 by Emma Jaramillo RN  Body Position: position changed independently  Intervention: Prevent and Manage VTE (Venous Thromboembolism) Risk  Recent Flowsheet Documentation  Taken 3/27/2024 2025 by Emma Jaramillo RN  Activity Management: up ad ranjana  Range of Motion: active ROM (range of motion) encouraged  Intervention: Prevent Infection  Recent Flowsheet Documentation  Taken 3/28/2024 0027 by Emma Jaramillo RN  Infection Prevention:   single patient room provided   rest/sleep promoted   hand hygiene promoted  Taken 3/27/2024 2205 by Emma Jaramillo RN  Infection Prevention:   single patient room provided   rest/sleep promoted   hand hygiene promoted  Taken 3/27/2024 2025 by Emma Jaramillo RN  Infection Prevention:   single patient room provided   rest/sleep promoted  Goal: Optimal Comfort and Wellbeing  Outcome: Ongoing, Progressing  Intervention: Provide Person-Centered Care  Recent Flowsheet Documentation  Taken 3/27/2024 2025 by Emma Jaramillo RN  Trust Relationship/Rapport:   care explained   choices provided   emotional support provided   empathic listening provided   questions answered   questions encouraged   reassurance  provided   thoughts/feelings acknowledged  Goal: Readiness for Transition of Care  Outcome: Ongoing, Progressing     Problem: Swallowing Impairment  Goal: Optimal Eating/Swallowing without Aspir  Outcome: Ongoing, Progressing     Problem: Suicide Risk  Goal: Absence of Self-Harm  Outcome: Ongoing, Progressing  Intervention: Promote Psychosocial Wellbeing  Recent Flowsheet Documentation  Taken 3/27/2024 2025 by Emma Jaramillo RN  Supportive Measures: active listening utilized   Goal Outcome Evaluation:      Patient resting quietly in bed with eyes closed, patient started on IV fluids due to continued NPO status, denies pain or distress, continues with cough, and continuously clearing throat, with thin clear secretions, safety maintained, call light in reach

## 2024-03-28 NOTE — PROGRESS NOTES
"    Chief complaint : nausea     Subjective .     History of present illness:   The patient is a 88 y.o. male who was admitted secondary to food impaction. PMHx: HTN, esophageal strictures. Psych consult was requested 2ry to SI.  The pt acknowledged sxs of depression for the past 4 years after his wife passed away.  The patient reported feeling lonely, depressed, depression is rated as 7 out of 10.  Patient denied any perceptual disturbances, he denied suicidal and homicidal ideations.  The patient is trying to stay busy, he denied any significant memory changes.  The patient lives alone with supervision from his children.   No symptoms of ryan or hypomania  Anxiety can be intense at times when he thinks about his wife  The patient denied any intention to harm himself, the patient does have guns at home, but he stated he would never use them to kill himself.   Past psychiatric history: Depression, no history of inpatient psychiatric admissions no history of suicides  The patient stated he was prescribed antidepressants in the past and they were not effective    Today patient reports nausea and vomiting  Family at bedside today with patient  Patient wants to discontinue Zoloft due to nausea and follow-up with his provider at home  Denies SI/HI/AVH      The following portions of the patient's history were reviewed and updated as appropriate: allergies, current medications, past family history, past medical history, past social history, past surgical history and problem list.    History    Objective     Vital Signs   /60 (BP Location: Left arm, Patient Position: Lying)   Pulse 81   Temp 98.2 °F (36.8 °C) (Oral)   Resp 18   Ht 172.7 cm (68\")   Wt 81.6 kg (179 lb 14.3 oz)   SpO2 96%   BMI 27.35 kg/m²       MENTAL STATUS EXAM   General Appearance:  Cleanly groomed and dressed  Eye Contact:  Good eye contact  Attitude:  Cooperative and polite  Muscle Strength:  Normal  Speech:  Soft spoken  Language:  " "Spontaneous  Mood and affect:  Normal, pleasant  Hopelessness:  Denies  Thought Process:  Logical, goal-directed and linear  Associations/ Thought Content:  No delusions  Hallucinations:  None  Suicidal Ideations:  Not present  Homicidal Ideation:  Not present  Sensorium:  Alert and clear  Orientation:  Person, place, situation and time  Attention Span/ Concentration:  Good  Fund of Knowledge:  Appropriate for age and educational level  Intellectual Functioning:  Average range  Insight:  Good  Judgement:  Good  Reliability:  Good  Impulse Control:  Good         Assessment & Plan       Food impaction of esophagus    Dysphagia       Assessment:  Major depressive d/o moderate single episode, bereavement   1. Food impaction of esophagus, initial encounter      Treatment Plan:  The patient presented with the symptoms of depression, bereavement.  The patient reported unsuccessful trials of antidepressants.   The patient denied any intention to harm himself, denied making suicidal statements, denied having a plan  Nursing records indicated \"no thoughts of harm or self\"   per Patient  Discontinue sertraline 25 mg p.o. daily at patient request, patient having issues with nausea and vomiting.  Sertraline can increase and cause nausea.  Patient was to follow-up with his primary care provider outpatient.   consult for grief therapy  A safety plan was discussed with the patient, he verbalized understanding, he will keep his guns locked and unloaded, the patient [place] farm  Cont to provide support, will follow as needed  Treatment Plan discussed with: Patient  I discussed the patients findings and my recommendations with patient, family, and nursing staff    I have reviewed and approved the behavioral health treatment plans and problem list. Yes  Thank you for the consult   Referring MD has access to consult report and progress notes in EMR     Suma Aggarwal DNP, [facility]  03/28/24  15:26 EDT           "

## 2024-03-28 NOTE — PROGRESS NOTES
LOS: 1 day   Patient Care Team:  Maddie Martinez MD as PCP - General (Family Medicine)  Jayy Kaur MD as Consulting Physician (Urology)  Renae Lucio MD as Consulting Physician (Dermatology)      Subjective     Interval History:     Subjective: Patient with no new complaints.  Denies chest pain or abdominal pain.  Has been n.p.o. overnight.  No shortness of breath.      ROS:   No chest pain, shortness of breath, or cough.       Medication Review:     Current Facility-Administered Medications:     acetaminophen (TYLENOL) tablet 650 mg, 650 mg, Oral, Q4H PRN, Shakir Ferreira MD    ampicillin-sulbactam (UNASYN) 3 g in sodium chloride 0.9 % 100 mL MBP, 3 g, Intravenous, Q6H, Natasha Holguin MD    sennosides-docusate (PERICOLACE) 8.6-50 MG per tablet 2 tablet, 2 tablet, Oral, BID PRN **AND** polyethylene glycol (MIRALAX) packet 17 g, 17 g, Oral, Daily PRN **AND** bisacodyl (DULCOLAX) EC tablet 5 mg, 5 mg, Oral, Daily PRN **AND** bisacodyl (DULCOLAX) suppository 10 mg, 10 mg, Rectal, Daily PRN, Shakir Ferreira MD    guaiFENesin-codeine (GUAIFENESIN AC) 100-10 MG/5ML liquid 10 mL, 10 mL, Oral, TID, Natasha Holguin MD    lactated ringers infusion, 75 mL/hr, Intravenous, Continuous, Janelle Cárdenas, DO, Last Rate: 75 mL/hr at 03/27/24 2036, 75 mL/hr at 03/27/24 2036    melatonin tablet 5 mg, 5 mg, Oral, Nightly PRN, Shakir Ferreira MD    ondansetron ODT (ZOFRAN-ODT) disintegrating tablet 4 mg, 4 mg, Oral, Q6H PRN **OR** ondansetron (ZOFRAN) injection 4 mg, 4 mg, Intravenous, Q6H PRN, Shakir Ferreira MD    sertraline (ZOLOFT) tablet 25 mg, 25 mg, Oral, Daily, Melody, Chantal G, MD    [COMPLETED] Insert Peripheral IV, , , Once **AND** sodium chloride 0.9 % flush 10 mL, 10 mL, Intravenous, PRN, Shakir Ferreira MD    sodium chloride 0.9 % flush 10 mL, 10 mL, Intravenous, Q12H, Shakir Ferreira MD, 10 mL at 03/27/24 0745    sodium chloride 0.9 % flush 10 mL, 10 mL,  "Intravenous, HELENANJhon Emori Bizer, MD, 10 mL at 03/27/24 1052    sodium chloride 0.9 % infusion 40 mL, 40 mL, Intravenous, Jhon BROWN Emori Bizer, MD, 300 mL at 03/27/24 0926      Objective     Vital Signs  Vitals:    03/27/24 1114 03/27/24 1800 03/27/24 2120 03/28/24 0502   BP: 164/85 158/70 154/65 134/63   BP Location: Left arm Right leg Left arm Left arm   Patient Position: Lying Lying Lying Lying   Pulse: 109 95 90 98   Resp: 17 16 18 18   Temp: 97.5 °F (36.4 °C) 98 °F (36.7 °C) 98 °F (36.7 °C) 98.2 °F (36.8 °C)   TempSrc: Oral Oral Oral Oral   SpO2: 99% 93% 95% 91%   Weight:       Height:           Physical Exam:     General Appearance:    Awake and alert, in no acute distress   Head:    Normocephalic, without obvious abnormality   Eyes:          Conjunctivae normal, anicteric sclera   Throat:   No oral lesions, no thrush, oral mucosa moist   Neck:   No adenopathy, supple, no JVD   Lungs:     respirations regular, even and unlabored   Abdomen:     Soft, non-tender, no rebound or guarding, non-distended   Rectal:     Deferred   Extremities:   No edema, no cyanosis   Skin:   No bruising or rash, no jaundice        Results Review:    CBC    Results from last 7 days   Lab Units 03/28/24  0439 03/27/24  0425   WBC 10*3/mm3 14.03* 8.61   HEMOGLOBIN g/dL 15.7 15.2   PLATELETS 10*3/mm3 234 213     CMP   Results from last 7 days   Lab Units 03/28/24  0439 03/27/24  0425   SODIUM mmol/L 142 142   POTASSIUM mmol/L 4.2 3.8   CHLORIDE mmol/L 105 106   CO2 mmol/L 27.0 25.0   BUN mg/dL 18 18   CREATININE mg/dL 1.44* 1.28*   GLUCOSE mg/dL 95 90     Cr Clearance Estimated Creatinine Clearance: 40.9 mL/min (A) (by C-G formula based on SCr of 1.44 mg/dL (H)).  Coag     HbA1C No results found for: \"HGBA1C\"      Infection     UA      Microbiology Results (last 10 days)       ** No results found for the last 240 hours. **          Imaging Results (Last 72 Hours)       Procedure Component Value Units Date/Time    CT Chest " Without Contrast Diagnostic [[id number]] Collected: 03/27/24 1325     Updated: 03/27/24 1338    Narrative:      CT CHEST WO CONTRAST DIAGNOSTIC    Date of Exam: 3/27/2024 1:07 PM EDT    Indication: Dysphagia, possible aspiration.    Comparison: Chest radiograph dated 7/25/2016    Technique: Axial CT images were obtained of the chest without contrast administration.  Sagittal and coronal reconstructions were performed.  Automated exposure control and iterative reconstruction methods were used.    Findings:  For full findings of the soft tissues of the neck, please see separately dictated CT neck report. The visualized soft tissue structures at the base of the neck including the thyroid appear within normal limits. There is no lower cervical or axillary   adenopathy.    The heart size is normal. There is no pericardial effusion. The aorta is normal in caliber without evidence of aneurysm formation. There is coronary and aortic atherosclerotic calcification. The main pulmonary artery is normal in caliber. There are small   mediastinal and hilar lymph nodes not meeting size criteria for pathologic enlargement. There are calcified right hilar lymph nodes likely related to chronic granulomatous disease.    The tracheobronchial tree is patent. There is minimal amount of secretions within the dependent portion of the trachea. There is no abnormal bronchial wall thickening or bronchiectasis. There is no acute consolidation or pleural effusion. There is no   evidence of pneumothorax. There is minimal bandlike atelectasis within the bilateral lower lobes. There is a small 4 mm pulmonary nodule within the posterior aspect of the left apex best seen on image 23 of series 3.    The esophagus is normal in course and caliber with a small sliding-type hiatal hernia. Visualized portions of the upper abdomen demonstrate no acute findings. There are partially visualized exophytic cysts arising from the left kidney. There are    multilevel degenerative changes of the cervical and thoracic spine.      Impression:      Impression:  1. Minimal secretions within the posterior aspect of the trachea. No evidence of bronchiolitis or evidence of aspiration pneumonia.  2. Small 4 mm pulmonary nodule within the posterior aspect of the left upper lobe. Recommend follow-up chest CT in 1 year if patient has risk factors such as smoking history.      Electronically Signed: Yohan Alcala    3/27/2024 1:36 PM EDT    Workstation ID: [id number]    CT Soft Tissue Neck Without Contrast [[id number]] Collected: 03/27/24 1323     Updated: 03/27/24 1328    Narrative:      CT SOFT TISSUE NECK WO CONTRAST    Date of Exam: 3/27/2024 1:07 PM EDT    Indication: dysphagia, s/p EGD with dilation and intubation.    Comparison: Chest CT 3/27/2024    Technique: Axial CT images were obtained of the neck without contrast administration.  Sagittal and coronal reconstructions were performed.  Automated exposure control and iterative reconstruction methods were used.      Findings:  Orbital structures are unremarkable. The skull base is normal. The paranasal sinuses appear clear. The nasopharynx, oropharynx, and hypopharynx appear normal. The airway appears normal. The esophagus appears unremarkable. There is no evidence of   hemorrhage. There are atherosclerotic calcifications in the aorta. There is no lymphadenopathy. There is a left upper lobe pulmonary nodule adjacent to the major fissure measuring 4 mm in diameter, unchanged from the previous CT.        Impression:      Impression:    1. No acute findings in the neck.  2. 4 mm left upper lobe pulmonary nodule    Electronically Signed: Ant Aguilar MD    3/27/2024 1:26 PM EDT    Workstation ID: [id number]            Assessment & Plan     ASSESSMENT:  -Food impaction s/p EGD showing spontaneous passage  -Dysphagia  -Hypertension  -Hyperlipidemia  -History of esophageal stricture    PLAN:  Patient is an 88-year-old male with  history of hypertension and hyperlipidemia who presented on 3/26 with complaints of food bolus.  Patient underwent EGD yesterday which showed that food impaction had spontaneously passed.  The patient was dilated to 52 [place].  Following the procedure, the patient reported that he was unable to swallow.  Bedside RN reported coughing and stridor with attempts at drinking water.    CT chest did not show any evidence of aspiration pneumonia.  CT soft tissue neck showed no acute abnormality.  Speech therapy evaluated the patient and deemed the patient not safe for oral intake.  We will plan reevaluation with speech therapist today today.  May need to consider ENT evaluation.  Continue supportive care.        Electronically signed by LIGIA Milan, 03/28/24, 10:12 AM EDT.

## 2024-03-28 NOTE — CONSULTS
[facility]: [facility]         CONSULT NOTE    Patient Identification:  Hever Guallpa  88 y.o.  male  1935  [id number]            Requesting physician: Attending physician    Reason for Consultation: Lung nodules      History of Present Illness:  88-year-old male with history of  HTN and HLD who presented 3/26/2024 with complaints of discomfort in the throat, dysphagia, shortness of breath and cough.  In ED he was reported to have dysphagia and stridor.  He underwent EGD and esophageal dilation 3/27/2024 and it showed the presence of gastric erosion.  Patient is afebrile, his blood pressure initially was high but today 134/63, oxygen saturation 91% on room air.  WBCs 14, hemoglobin 15.7.  CT scan of the chest shows minimal secretions in posterior aspect of the trachea and a small 4 mm left upper lobe nodule    Assessment:  Food impaction of esophagus  Dysphagia  Incidental 4 mm left upper lobe nodule  Acute bronchitis secondary to aspiration: No evidence of pneumonia on CT scan but the patient has copious purulent secretions upon coughing  Non-smoker  Major depression  HTN  History of prostate CA status post radiation therapy      Recommendations:  Start Unasyn  Encourage use of incentive spirometry  Guaifenesin  Oxygen supplement and titration to maintain saturation 90 to 95%: Currently on room air  Speech pathology following and planning video-assisted swallow study    Patient will need CT scan of the chest without contrast in 1 year    I personally reviewed the radiological studies      Review of Sytems:  Constitutional: Negative for chills, and fever and positive for malaise/fatigue.   HENT: Negative.    Eyes: Negative.    Cardiovascular: Negative.    Respiratory: Positive for cough and shortness of breath.    Skin: Negative.    Musculoskeletal: Negative.    Gastrointestinal: Dysphagia  Genitourinary: Negative.    Neurological: Negative.        Past Medical History:  Past Medical History:  "  Diagnosis Date    Hyperlipidemia     Hypertension     Neck pain     Osteopenia     Sleep disorder     Thrombocytopenia     Vitamin D deficiency        Past Surgical History:  Past Surgical History:   Procedure Laterality Date    COLONOSCOPY      CYST REMOVAL          Home Meds:  No medications prior to admission.       Allergies:  No Known Allergies    Social History:   Social History     Socioeconomic History    Marital status:    Tobacco Use    Smoking status: Former     Current packs/day: 0.00     Types: Cigarettes     Start date:      Quit date:      Years since quittin.2    Smokeless tobacco: Never   Vaping Use    Vaping status: Never Used   Substance and Sexual Activity    Alcohol use: No    Drug use: No    Sexual activity: Yes     Partners: Female     Birth control/protection: None       Family History:  Family History   Family history unknown: Yes       Physical Exam:  /63 (BP Location: Left arm, Patient Position: Lying)   Pulse 98   Temp 98.2 °F (36.8 °C) (Oral)   Resp 18   Ht 172.7 cm (68\")   Wt 81.6 kg (179 lb 14.3 oz)   SpO2 91%   BMI 27.35 kg/m²  Body mass index is 27.35 kg/m². 91% 81.6 kg (179 lb 14.3 oz)  General Appearance:  Alert   HEENT:  Normocephalic, without obvious abnormality, Conjunctiva/corneas clear,.   Nares normal, no drainage     Neck:  Supple, symmetrical, trachea midline. No JVD.  Lungs /Chest wall:   Good air entry with minimal bilateral basal rhonchi, respirations unlabored, symmetrical wall movement.     Heart:  Regular rate and rhythm, S1 S2 normal  Abdomen: Soft, non-tender, no masses, no organomegaly.    Extremities: No edema, no clubbing or cyanosis    LABS:  Lab Results   Component Value Date    CALCIUM 9.3 2024     Results from last 7 days   Lab Units 24  0439 24  0425   SODIUM mmol/L 142 142   POTASSIUM mmol/L 4.2 3.8   CHLORIDE mmol/L 105 106   CO2 mmol/L 27.0 25.0   BUN mg/dL 18 18   CREATININE mg/dL 1.44* 1.28*   GLUCOSE " "mg/dL 95 90   CALCIUM mg/dL 9.3 9.2   WBC 10*3/mm3 14.03* 8.61   HEMOGLOBIN g/dL 15.7 15.2   PLATELETS 10*3/mm3 234 213     No results found for: \"CKTOTAL\", \"CKMB\", \"CKMBINDEX\", \"TROPONINI\", \"TROPONINT\"                              No results found for: \"TSH\"  Estimated Creatinine Clearance: 40.9 mL/min (A) (by C-G formula based on SCr of 1.44 mg/dL (H)).         Imaging:  Imaging Results (Last 24 Hours)       Procedure Component Value Units Date/Time    CT Chest Without Contrast Diagnostic [[id number]] Collected: 03/27/24 1325     Updated: 03/27/24 1338    Narrative:      CT CHEST WO CONTRAST DIAGNOSTIC    Date of Exam: 3/27/2024 1:07 PM EDT    Indication: Dysphagia, possible aspiration.    Comparison: Chest radiograph dated 7/25/2016    Technique: Axial CT images were obtained of the chest without contrast administration.  Sagittal and coronal reconstructions were performed.  Automated exposure control and iterative reconstruction methods were used.    Findings:  For full findings of the soft tissues of the neck, please see separately dictated CT neck report. The visualized soft tissue structures at the base of the neck including the thyroid appear within normal limits. There is no lower cervical or axillary   adenopathy.    The heart size is normal. There is no pericardial effusion. The aorta is normal in caliber without evidence of aneurysm formation. There is coronary and aortic atherosclerotic calcification. The main pulmonary artery is normal in caliber. There are small   mediastinal and hilar lymph nodes not meeting size criteria for pathologic enlargement. There are calcified right hilar lymph nodes likely related to chronic granulomatous disease.    The tracheobronchial tree is patent. There is minimal amount of secretions within the dependent portion of the trachea. There is no abnormal bronchial wall thickening or bronchiectasis. There is no acute consolidation or pleural effusion. There is no   evidence " of pneumothorax. There is minimal bandlike atelectasis within the bilateral lower lobes. There is a small 4 mm pulmonary nodule within the posterior aspect of the left apex best seen on image 23 of series 3.    The esophagus is normal in course and caliber with a small sliding-type hiatal hernia. Visualized portions of the upper abdomen demonstrate no acute findings. There are partially visualized exophytic cysts arising from the left kidney. There are   multilevel degenerative changes of the cervical and thoracic spine.      Impression:      Impression:  1. Minimal secretions within the posterior aspect of the trachea. No evidence of bronchiolitis or evidence of aspiration pneumonia.  2. Small 4 mm pulmonary nodule within the posterior aspect of the left upper lobe. Recommend follow-up chest CT in 1 year if patient has risk factors such as smoking history.      Electronically Signed: Yohan Rodriguezelor    3/27/2024 1:36 PM EDT    Workstation ID: [id number]    CT Soft Tissue Neck Without Contrast [[id number]] Collected: 03/27/24 1323     Updated: 03/27/24 1328    Narrative:      CT SOFT TISSUE NECK WO CONTRAST    Date of Exam: 3/27/2024 1:07 PM EDT    Indication: dysphagia, s/p EGD with dilation and intubation.    Comparison: Chest CT 3/27/2024    Technique: Axial CT images were obtained of the neck without contrast administration.  Sagittal and coronal reconstructions were performed.  Automated exposure control and iterative reconstruction methods were used.      Findings:  Orbital structures are unremarkable. The skull base is normal. The paranasal sinuses appear clear. The nasopharynx, oropharynx, and hypopharynx appear normal. The airway appears normal. The esophagus appears unremarkable. There is no evidence of   hemorrhage. There are atherosclerotic calcifications in the aorta. There is no lymphadenopathy. There is a left upper lobe pulmonary nodule adjacent to the major fissure measuring 4 mm in diameter, unchanged  from the previous CT.        Impression:      Impression:    1. No acute findings in the neck.  2. 4 mm left upper lobe pulmonary nodule    Electronically Signed: Ant Aguilar MD    3/27/2024 1:26 PM EDT    Workstation ID: [id number]              Current Meds:   SCHEDULE  sertraline, 25 mg, Oral, Daily  sodium chloride, 10 mL, Intravenous, Q12H      Infusions  lactated ringers, 75 mL/hr, Last Rate: 75 mL/hr (03/27/24 2036)      PRNs    acetaminophen    senna-docusate sodium **AND** polyethylene glycol **AND** bisacodyl **AND** bisacodyl    melatonin    ondansetron ODT **OR** ondansetron    [COMPLETED] Insert Peripheral IV **AND** sodium chloride    sodium chloride    sodium chloride        Natasha Holguin MD  3/28/2024  09:32 EDT      Much of this encounter note is an electronic transcription/translation of spoken language to printed text using Dragon Software.

## 2024-03-28 NOTE — THERAPY RE-EVALUATION
Acute Care - Speech Language Pathology   Swallow Re-Evaluation  Buzz     Patient Name: Hever Guallpa  : 1935  MRN: [id number]  Today's Date: 3/28/2024               Admit Date: 3/26/2024    Visit Dx:     ICD-10-CM ICD-9-CM   1. Food impaction of esophagus, initial encounter  T18.128A 935.1    W44.F3XA      Patient Active Problem List   Diagnosis    Neck pain    Body mass index (BMI) of 28.0-28.9 in adult    Altered bowel function    Family history of malignant neoplasm of colon    History of malignant neoplasm of prostate    Hx of radiation therapy    Hyperlipidemia    Hypertension    Melanocytic nevus    Osteopenia    Sleep disorder    Thrombocytopenia    Vitamin D deficiency    Food impaction of esophagus    Dysphagia     Past Medical History:   Diagnosis Date    Hyperlipidemia     Hypertension     Neck pain     Osteopenia     Sleep disorder     Thrombocytopenia     Vitamin D deficiency      Past Surgical History:   Procedure Laterality Date    COLONOSCOPY      CYST REMOVAL         SLP Recommendation and Plan     SWALLOW EVALUATION (Last 72 Hours)            EDUCATION  The patient has been educated in the following areas:   Dysphagia (Swallowing Impairment) Oral Care/Hydration NPO rationale.        SLP GOALS       Row Name 24 1000       (LTG) Swallow    (LTG) Swallow The patient will maximize swallow function for least restrictive PO diet, exhibiting no complications associated with dysphagia, adequate PO intake, and demonstrating independent use of safe swallow strategies.  -CB    Progress/Outcomes (Swallow Long Term Goal) goal ongoing  -CB       (STG) Swallow 1    (STG) Swallow 1 Patient will participate in ongoing assessment of swallow, including reevaluation clinically and/or including instrumental assessment of swallow if indicated, to further assess swallow function , intiate PO diet, and establish least restrictive diet  -CB    Progress/Outcomes (Swallow Short Term Goal 1) goal ongoing   -CB    Comment (Swallow Short Term Goal 1) Patient was seen for re-evaluation of swallow function. At rest patient displays wet gurgly vocal quality. Patient reports no history of pneumonia and/or CVA. Patient does report that he has history of stricture and GERD. He does not take routine  medication for this GERD. Patient also has history of hiatal hernia at GE junction ring and UES s/p stricture post dilation in 2014. Patient denies any seasonal allergies. Recent EGD revealed no evidence of aspiration pneumonia. Minimal secretions within posterior aspect of trachea. No presence of stricture or rings/stenosis. Patient has upper and lower denture that fit securely. Oral mechanism examination revealed patient demonstrated full ROM and mobility of lingual and labial structures with exception of reduce labial protrusion. Lingual strength was reduced. Jaw strength was adequate. Palatal movement was present.  Noted diminished and/or absent gag reflex. Noted some redness posterior palate. Properly positoned patient upright in bed at 90 degree hip flexion. Provided trials of ice chips x 3. Patient displayed coughing x 3. Provided trials of 1/2 tsp thins via spoon x 3. Given digital palpatation, suspect reduce laryngeal elevation. Patient continuously coughs throughout. No further trials were attempted. It is recommended that patient remain NPO at this time until completion of VFSS to determine safety with PO.  -FOREST              User Key  (r) = Recorded By, (t) = Taken By, (c) = Cosigned By      Initials Name Provider Type    Bernarda Sanchez SLP Speech and Language Pathologist                             Time Calculation:                SHARA Chau  3/28/2024

## 2024-03-28 NOTE — PLAN OF CARE
Goal Outcome Evaluation:            Patient was seen for re-evaluation of swallow function. At rest patient displays wet gurgly vocal quality. Patient reports no history of pneumonia and/or CVA. Patient does report that he has history of stricture and GERD. He does not take routine  medication for this GERD. Patient also has history of hiatal hernia at GE junction ring and UES s/p stricture post dilation in 2014. Patient denies any seasonal allergies. Recent EGD revealed no evidence of aspiration pneumonia. Minimal secretions within posterior aspect of trachea. No presence of stricture or rings/stenosis. Patient has upper and lower denture that fit securely. Oral mechanism examination revealed patient demonstrated full ROM and mobility of lingual and labial structures with exception of reduce labial protrusion. Lingual strength was reduced. Jaw strength was adequate. Palatal movement was present.  Noted diminished and/or absent gag reflex. Noted some redness posterior palate. Properly positoned patient upright in bed at 90 degree hip flexion. Provided trials of ice chips x 3. Patient displayed coughing x 3. Provided trials of 1/2 tsp thins via spoon x 3. Given digital palpatation, suspect reduce laryngeal elevation. Patient continuously coughs throughout. No further trials were attempted. It is recommended that patient remain NPO at this time until completion of VFSS to determine safety with PO.

## 2024-03-28 NOTE — MBS/VFSS/FEES
Acute Care - Speech Language Pathology   Swallow Initial Evaluation  Buzz     Patient Name: Hever Guallpa  : 1935  MRN: [id number]  Today's Date: 3/28/2024               Admit Date: 3/26/2024    Visit Dx:     ICD-10-CM ICD-9-CM   1. Food impaction of esophagus, initial encounter  T18.128A 935.1    [id number].F3XA      Patient Active Problem List   Diagnosis    Neck pain    Body mass index (BMI) of 28.0-28.9 in adult    Altered bowel function    Family history of malignant neoplasm of colon    History of malignant neoplasm of prostate    Hx of radiation therapy    Hyperlipidemia    Hypertension    Melanocytic nevus    Osteopenia    Sleep disorder    Thrombocytopenia    Vitamin D deficiency    Food impaction of esophagus    Dysphagia     Past Medical History:   Diagnosis Date    Hyperlipidemia     Hypertension     Neck pain     Osteopenia     Sleep disorder     Thrombocytopenia     Vitamin D deficiency      Past Surgical History:   Procedure Laterality Date    COLONOSCOPY      CYST REMOVAL         SLP Recommendation and Plan  SLP Swallowing Diagnosis: severe, pharyngeal dysphagia (24)  SLP Diet Recommendation: NPO (24)     SLP Rec. for Method of Medication Administration: meds via alternate route (24)     Monitor for Signs of Aspiration: yes, notify SLP if any concerns (24)     Swallow Criteria for Skilled Therapeutic Interventions Met: demonstrates skilled criteria (24)     Rehab Potential/Prognosis, Swallowing: good, to achieve stated therapy goals (24)  Therapy Frequency (Swallow): PRN (24)  Predicted Duration Therapy Intervention (Days): until discharge (24)  Oral Care Recommendations: Oral Care BID/PRN (24)        SWALLOW EVALUATION (Last 72 Hours)       SLP Adult Swallow Evaluation       Row Name 24       Rehab Evaluation    Document Type evaluation  -CB    Subjective Information complains of   difficulty swallowing.  -CB    Patient Observations alert;cooperative  -CB    Patient/Family/Caregiver Comments/Observations Patient able to follow simple directives.  -CB    Patient Effort good  -CB    Comment --       General Information    Patient Profile Reviewed yes  -CB    Pertinent History Of Current Problem This is an 88-year-old male who presents with acute food impaction. He states he was eating fried potatoes yesterday at 5 PM and that the food got stuck. He is unable to swallow secretions. He does feel a sense of relief this morning and wonders if the food bolus is passed but he is not sure. He reports a recent history of dysphagia and odynophagia. He has a history of reflux symptoms in the past but none recently. No unintentional weight loss. No blood thinners. Last EGD 2014 hiatal hernia GE junction ring and UES stricture status post dilation 54 Yoruba.  -CB    Current Method of Nutrition NPO  -CB    Precautions/Limitations, Vision --    Precautions/Limitations, Hearing --    Prior Level of Function-Swallowing --    Plans/Goals Discussed with --       Pain    Additional Documentation Pain Scale: FACES Pre/Post-Treatment (Group)  -CB       Pain Scale: FACES Pre/Post-Treatment    Pain: FACES Scale, Pretreatment 0-->no hurt  -CB    Posttreatment Pain Rating 0-->no hurt  -CB       Oral Motor Structure and Function    Oral Lesions or Structural Abnormalities and/or variants --    Dentition Assessment upper dentures/partial in place;lower dentures/partial in place  -CB    Secretion Management WNL/WFL  -CB    Mucosal Quality dry  -CB    Gag Response absent or diminished  -CB       Oral Musculature and Cranial Nerve Assessment    Oral Motor General Assessment WFL  -CB    Oral Motor, Comment Oral mechanism examination revealed patient demonstrated full ROM and mobility of lingual structures but noted reduce labial protrusion and retraction particularly on left side. Lingual strength was reduced. Jaw strength  was adequate. Palatal movement was present. Noted diminished and/or absent gag reflex. Also noted left eye was less opened than  on right. Patient noted to have redness at posterior palate.  -CB       General Eating/Swallowing Observations    Respiratory Support Currently in Use --    Eating/Swallowing Skills --    Positioning During Eating --    Utensils Used --    Consistencies Trialed --       Clinical Swallow Eval    Clinical Swallow Evaluation Summary --       MBS/VFSS Interpretation    VFSS Summary Video fluorocopic swallow study was conducted in the lateral position with patient seated upright. Patient was fed by staff. Trials provided are as follows: nectar thick via spoon x 1, thins via spoon x 1. Oral stage presented as reduce BOT as patient exhibited premature spillage. Pharyngeal stage was significant for reduce hyo-laryngeal excursion, no epiglottic deflection and incomplete laryngeal vestibular closure. Moderate residue noted both in the valleculae and pyriform with the inability to clear  residue d/t non functional swallow with both trials of nectar and thin. Patient demonstrated deep penetration with nectar that did not clear from incomplete posterior laryngeal  vestibular closure. This represent on the Rosenbek penetration-aspiration scale of a 5. With thin liquid patient exhibits aspiration from the pharyngeal residue after the swallow. This represent on the Rosenbek penetration-aspiration scale as a 7. Overall, patient had a non functional swallow. It is recommended that patient remain NPO at this time. It is recommended that patient receive bypass feeding for primary nourishment at this time. ST will follow for pharyngeal strengthening exercises to improve ability to swallow more effectively.  -CB       SLP Evaluation Clinical Impression    SLP Swallowing Diagnosis severe;pharyngeal dysphagia  -CB    Functional Impact risk of aspiration/pneumonia  -CB    Rehab Potential/Prognosis, Swallowing  good, to achieve stated therapy goals  -CB    Swallow Criteria for Skilled Therapeutic Interventions Met demonstrates skilled criteria  -CB       Recommendations    Therapy Frequency (Swallow) PRN  -CB    Predicted Duration Therapy Intervention (Days) until discharge  -CB    SLP Diet Recommendation NPO  -CB    Recommended Diagnostics --    Oral Care Recommendations Oral Care BID/PRN  -CB    SLP Rec. for Method of Medication Administration meds via alternate route  -CB    Monitor for Signs of Aspiration yes;notify SLP if any concerns  -CB       Swallow Goals (SLP)    Swallow LTGs Swallow Long Term Goal (free text)  -CB    Swallow STGs diet tolerance goal selection (SLP);pharyngeal strengthening exercise goal selection (SLP)  -CB    Diet Tolerance Goal Selection (SLP) Swallow Short Term Goal 1;Swallow Short Term Goal 2  -CB    Pharyngeal Strengthening Exercise Goal Selection (SLP) pharyngeal strengthening exercise, SLP goal 1  -CB       (LTG) Swallow    (LTG) Swallow The patient will maximize swallow function for least restrictive PO diet, exhibiting no complications associated with dysphagia, adequate PO intake, and demonstrating independent use of safe swallow strategies.  -CB    Time Frame (Swallow Long Term Goal) by discharge  -CB    Progress/Outcomes (Swallow Long Term Goal) goal ongoing  -CB       (STG) Swallow 1    (STG) Swallow 1 Patient will participate in ongoing assessment of swallow, including reevaluation clinically and/or including instrumental assessment of swallow if indicated, to further assess swallow function , intiate PO diet, and establish least restrictive diet  -CB    Time Frame (Swallow Short Term Goal 1) 1 week  -CB    Progress/Outcomes (Swallow Short Term Goal 1) goal met  -CB       (STG) Swallow 2    (STG) Swallow 2 Patient will participate in pharyngeal strengthening exercises to improve pharyngeal strength and mobility for improved swallowing.  -CB       (STG) Pharyngeal Strengthening  Exercise Goal 1 (SLP)    Activity (Pharyngeal Strengthening Goal 1, SLP) increase anterior movement of the hyolaryngeal complex;increase epiglottic inversion and retroflexion;increase squeeze/positive pressure generation;increase tongue base retraction;increase PES opening  -CB    Increase Superior Movement of the Hyolaryngeal Complex shaker;chin tuck against resistance (CTAR);effortful pitch glide (falsetto + pharyngeal squeeze);Mendelsohn;falsetto  -CB    Increase Anterior Movement of the Hyolaryngeal Complex shaker;chin tuck against resistance (CTAR);falsetto;hard effortful swallow  -CB    Increase Epiglottic Inversion and Retroflexion hard effortful swallow  -CB    Increase Squeeze/Positive Pressure Generation chin tuck against resistance (CTAR);shaker  -CB    Increase Tongue Base Retraction eloise  -CB    Time Frame (Pharyngeal Strengthening Goal 1, SLP) short term goal (STG)  -CB    Progress/Outcomes (Pharyngeal Strengthening Goal 1, SLP) new goal  -CB              User Key  (r) = Recorded By, (t) = Taken By, (c) = Cosigned By      Initials Name Effective Dates    CB Bernarda Tan, SLP 09/21/21 -                           EDUCATION  The patient has been educated in the following areas:   Dysphagia (Swallowing Impairment) Oral Care/Hydration NPO rationale.        SLP GOALS       Row Name 03/28/24 1100 03/28/24 1000 03/27/24 1500       (LTG) Swallow    (LTG) Swallow The patient will maximize swallow function for least restrictive PO diet, exhibiting no complications associated with dysphagia, adequate PO intake, and demonstrating independent use of safe swallow strategies.  -CB The patient will maximize swallow function for least restrictive PO diet, exhibiting no complications associated with dysphagia, adequate PO intake, and demonstrating independent use of safe swallow strategies.  -CB The patient will maximize swallow function for least restrictive PO diet, exhibiting no complications associated with  dysphagia, adequate PO intake, and demonstrating independent use of safe swallow strategies.  -RM    Time Frame (Swallow Long Term Goal) by discharge  -CB -- --    Progress/Outcomes (Swallow Long Term Goal) goal ongoing  -CB goal ongoing  -CB new goal  -RM       (STG) Swallow 1    (STG) Swallow 1 Patient will participate in ongoing assessment of swallow, including reevaluation clinically and/or including instrumental assessment of swallow if indicated, to further assess swallow function , intiate PO diet, and establish least restrictive diet  -CB Patient will participate in ongoing assessment of swallow, including reevaluation clinically and/or including instrumental assessment of swallow if indicated, to further assess swallow function , intiate PO diet, and establish least restrictive diet  -CB Patient will participate in ongoing assessment of swallow, including reevaluation clinically and/or including instrumental assessment of swallow if indicated, to further assess swallow function , intiate PO diet, and establish least restrictive diet  -RM    Time Frame (Swallow Short Term Goal 1) 1 week  -CB -- --    Progress/Outcomes (Swallow Short Term Goal 1) goal met  -CB goal ongoing  -CB new goal  -RM    Comment (Swallow Short Term Goal 1) -- Patient was seen for re-evaluation of swallow function. At rest patient displays wet gurgly vocal quality. Patient reports no history of pneumonia and/or CVA. Patient does report that he has history of stricture and GERD. He does not take routine  medication for this GERD. Patient also has history of hiatal hernia at GE junction ring and UES s/p stricture post dilation in 2014. Patient denies any seasonal allergies. Recent EGD revealed no evidence of aspiration pneumonia. Minimal secretions within posterior aspect of trachea. No presence of stricture or rings/stenosis. Patient has upper and lower denture that fit securely. Oral mechanism examination revealed patient demonstrated  full ROM and mobility of lingual and labial structures with exception of reduce labial protrusion. Lingual strength was reduced. Jaw strength was adequate. Palatal movement was present.  Noted diminished and/or absent gag reflex. Noted some redness posterior palate. Properly positoned patient upright in bed at 90 degree hip flexion. Provided trials of ice chips x 3. Patient displayed coughing x 3. Provided trials of 1/2 tsp thins via spoon x 3. Given digital palpatation, suspect reduce laryngeal elevation. Patient continuously coughs throughout. No further trials were attempted. It is recommended that patient remain NPO at this time until completion of VFSS to determine safety with PO.  -CB --       (STG) Swallow 2    (STG) Swallow 2 Patient will participate in pharyngeal strengthening exercises to improve pharyngeal strength and mobility for improved swallowing.  -CB -- --       (STG) Pharyngeal Strengthening Exercise Goal 1 (SLP)    Activity (Pharyngeal Strengthening Goal 1, SLP) increase anterior movement of the hyolaryngeal complex;increase epiglottic inversion and retroflexion;increase squeeze/positive pressure generation;increase tongue base retraction;increase PES opening  -CB -- --    Increase Superior Movement of the Hyolaryngeal Complex shaker;chin tuck against resistance (CTAR);effortful pitch glide (falsetto + pharyngeal squeeze);Mendelsohn;falsetto  -CB -- --    Increase Anterior Movement of the Hyolaryngeal Complex shaker;chin tuck against resistance (CTAR);falsetto;hard effortful swallow  -CB -- --    Increase Epiglottic Inversion and Retroflexion hard effortful swallow  -CB -- --    Increase Squeeze/Positive Pressure Generation chin tuck against resistance (CTAR);shaker  -CB -- --    Increase Tongue Base Retraction eloise  -CB -- --    Time Frame (Pharyngeal Strengthening Goal 1, SLP) short term goal (STG)  -CB -- --    Progress/Outcomes (Pharyngeal Strengthening Goal 1, SLP) new goal  -CB -- --               User Key  (r) = Recorded By, (t) = Taken By, (c) = Cosigned By      Initials Name Provider Type    Bernarda Sanchez, SLP Speech and Language Pathologist     Jonathan Cote, SLP Speech and Language Pathologist                       Time Calculation:       Therapy Charges for Today       Code Description Service Date Service Provider Modifiers Qty    [id number]  ST TREATMENT SWALLOW 5 3/28/2024 Bernarda Tan SLP GN 1                 SHARA Chau  3/28/2024

## 2024-03-28 NOTE — CONSULTS
Primary Care Provider: Provider, No Known     Consult requested by: Dr. Padron    Reason for Consultation: Neurological evaluation /dysphagia and also ptosis on the left side    History taken from: patient chart RN    Chief complaint: Difficulty swallowing       SUBJECTIVE:    History of present illness: Background per H&P: Hever Guallpa is a 88 y.o. year old male who was evaluated in room observation unit 108 at [facility]    Source of information is the patient and the medical records and very detailed evaluation done by other physicians    The patient is a pleasant 88-year-old right-handed white male who reports that on the evening of March 26th, Tuesday, he was eating burnt/overdone potatoes and one of them got stuck.  So he came in was evaluated in the next day he already saw the GI team and by that time he had reported some spontaneous relief and EGD did not show impaction but did show some gastritis and constriction and he had dilation done    Since then he has become very ataxic as far as walking is concerned and he has developed significant ptosis on the left side.  He has also failed swallow study and he is very dysarthric      He is going for MRI brain    His CT neck soft tissue was okay    CT chest showed a 4 mm solitary pulmonary nodule?    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 but had obviously had toxic sprays done on his plants etc. in the past    He denies any trauma  He denies any weakness        As per admitting,  History of Present Illness  Obtained from ED provider HPI on  "3/26/2024:  Patient is an 88-year-old male presents emergency department with complaint of a food bolus.  He reports he was eating potatoes tonight, and they are second throat.  He does have a history of stricture and esophageal flexion past     03/27/24:  Patient confirms the HPI noted above including eating some \"overdone potatoes\" at approximately 1730 on the day of presentation and subsequently noting a sensation as if they were \"stuck in his throat\".  He continues to report some discomfort as well as pain as well as cough though denies any dyspnea associated with the symptoms throughout the night though this has improved to some degree.  He does still have some cough with no current nausea.  Patient had admitted to some feelings of depression on initial admission screening and psychiatry was consulted.  He does confirm that he lost his wife of 60+ years approximately 4 years prior and does currently live alone on a farm though he remains fairly active and has good family support.  He does report some continued sadness regarding the loss of his wife and has at times thought that things would be better if he was no longer here though he confirms that he is not actively suicidal and has no plans to harm himself.     Following procedure patient attempted p.o. intake of liquids and was found to have significant dysphagia with cough and stridor noted with any attempt at even small amounts of of liquid intake.  He was reevaluated by anesthesiology as well as GI who ordered additional imaging along with speech evaluation with speech pathologist noting persistent cough with small amounts of p.o. intake and recommended he continue n.p.o. status with plans for reevaluation the following day         - Portions of the above HPI were copied from previous encounters and edited as appropriate. PMH as detailed below.     Review of Systems   No fever chills rigors or sweats  No weight issues  No sleep problems  HEENT:  He has " dysarthric speech, no vision changes, but does have facial asymmetry with ptosis of the left side but pain, or neck problem    He has significant swallowing problem  Chest: No chest pain, clubbing, cyanosis, orthopnea palpitations  Pulmonary:  No shortness of air, cough or expectoration  Abdomen:  No swelling/tension, constipation,diarrhea or pain  No genitourinary symptoms  Extremity problems as discussed  No back problem  No psychotic issues  Neurologic issues as discussed  No hematologic, dermatologic or endocrine problems        PATIENT HISTORY:  Past Medical History:   Diagnosis Date    Hyperlipidemia     Hypertension     Neck pain     Osteopenia     Sleep disorder     Thrombocytopenia     Vitamin D deficiency    ,   Past Surgical History:   Procedure Laterality Date    COLONOSCOPY      CYST REMOVAL     ,   Family History   Family history unknown: Yes   ,   Social History     Tobacco Use    Smoking status: Former     Current packs/day: 0.00     Types: Cigarettes     Start date:      Quit date:      Years since quittin.2    Smokeless tobacco: Never   Vaping Use    Vaping status: Never Used   Substance Use Topics    Alcohol use: No    Drug use: No   ,   Prior to Admission medications    Not on File    Allergies:  Patient has no known allergies.    Current Facility-Administered Medications   Medication Dose Route Frequency Provider Last Rate Last Admin    acetaminophen (TYLENOL) tablet 650 mg  650 mg Oral Q4H PRN Shakir Ferreira MD        ampicillin-sulbactam (UNASYN) 3 g in sodium chloride 0.9 % 100 mL MBP  3 g Intravenous Q6H Natasha Holguin MD   3 g at 24 1138    sennosides-docusate (PERICOLACE) 8.6-50 MG per tablet 2 tablet  2 tablet Oral BID PRN Shakir Ferreira MD        And    polyethylene glycol (MIRALAX) packet 17 g  17 g Oral Daily PRN Shakir Ferreira MD        And    bisacodyl (DULCOLAX) EC tablet 5 mg  5 mg Oral Daily PRShakir Carrero MD        And     bisacodyl (DULCOLAX) suppository 10 mg  10 mg Rectal Daily PRN Shakir Ferreira MD        guaiFENesin-codeine (GUAIFENESIN AC) 100-10 MG/5ML liquid 10 mL  10 mL Oral TID Natasha Holguin MD        lactated ringers infusion  75 mL/hr Intravenous Continuous Janelle Cárdenas DO 75 mL/hr at 03/27/24 2036 75 mL/hr at 03/27/24 2036    melatonin tablet 5 mg  5 mg Oral Nightly PRShakir Carrero MD        ondansetron ODT (ZOFRAN-ODT) disintegrating tablet 4 mg  4 mg Oral Q6H PRN Shakir Ferreira MD        Or    ondansetron (ZOFRAN) injection 4 mg  4 mg Intravenous Q6H PRShakir Carrero MD        sertraline (ZOLOFT) tablet 25 mg  25 mg Oral Daily Chantal Oliver MD        sodium chloride 0.9 % flush 10 mL  10 mL Intravenous PRN Shakir Ferreira MD        sodium chloride 0.9 % flush 10 mL  10 mL Intravenous Q12H Shakir Ferreira MD   10 mL at 03/28/24 1212    sodium chloride 0.9 % flush 10 mL  10 mL Intravenous PRN Shakir Ferreira MD   10 mL at 03/27/24 1052    sodium chloride 0.9 % infusion 40 mL  40 mL Intravenous Shakir Antonio MD   300 mL at 03/27/24 0926        ________________________________________________________        OBJECTIVE:  Upon today's exam, the gentleman is resting comfortably in bed in no acute distress     Neurologic Exam    The patient is awake and alert and oriented x3    Very significantly dysarthric but not aphasic     Cranial nerve examination demonstrate:  Full fields of vision to confrontation  Pupils are round, reactive to light and accommodation and size of about 3 mm  Nephric and ptosis on the left side    Addendum, 4/4/2024, 2:58 PM, the above  Should read, he does have ptosis on the left side  Funduscopic examination was not successful  Eye movements are conjugate     Sensation on the face and scalp are normal  Muscles of mastication are normal and symmetric  Muscles of  facial expression demonstrate significantly weak left orbicularis  oculi  Hearing is intact bilaterally  Head turning and shoulder shrugs were unremarkable  Tongue was midline  I could not visualize  oropharynx or uvula    I did not see any significant neck flexion or extension problem     Motor examination:  Normal bulk, tone and strength was 5-/5 all over  No fasciculations     Sensory examination:  Intact for soft touch, pain   Romberg was not evaluated     Reflexes:  0/4     Coordination:  Normal finger-to-nose to finger, rapid alternating movements and toe to finger  And heel-to-shin  Gait:  Deferred     Toe signs:  [place]    ________________________________________________________   RESULTS REVIEW:    VITAL SIGNS:   Temp:  [98 °F (36.7 °C)-98.2 °F (36.8 °C)] 98.2 °F (36.8 °C)  Heart Rate:  [81-98] 81  Resp:  [16-18] 18  BP: (134-158)/(60-70) 136/60     LABS:      Lab 03/28/24  0439 03/27/24  0425   WBC 14.03* 8.61   HEMOGLOBIN 15.7 15.2   HEMATOCRIT 50.1 48.3   PLATELETS 234 213   NEUTROS ABS 12.57* 7.17*   IMMATURE GRANS (ABS) 0.05 0.05   LYMPHS ABS 0.45* 0.63*   MONOS ABS 0.92* 0.55   EOS ABS 0.00 0.15   MCV 88.0 86.6         Lab 03/28/24  0439 03/27/24  0425   SODIUM 142 142   POTASSIUM 4.2 3.8   CHLORIDE 105 106   CO2 27.0 25.0   ANION GAP 10.0 11.0   BUN 18 18   CREATININE 1.44* 1.28*   EGFR 46.7* 53.8*   GLUCOSE 95 90   CALCIUM 9.3 9.2                             Lab Results   Component Value Date     (H) 11/21/2019       IMAGING STUDIES:  CT Chest Without Contrast Diagnostic    Result Date: 3/27/2024  Impression: 1. Minimal secretions within the posterior aspect of the trachea. No evidence of bronchiolitis or evidence of aspiration pneumonia. 2. Small 4 mm pulmonary nodule within the posterior aspect of the left upper lobe. Recommend follow-up chest CT in 1 year if patient has risk factors such as smoking history. Electronically Signed: Yohan Alcala  3/27/2024 1:36 PM EDT  Workstation ID: [id number]    CT Soft Tissue Neck Without Contrast    Result Date:  3/27/2024  Impression: 1. No acute findings in the neck. 2. 4 mm left upper lobe pulmonary nodule Electronically Signed: Ant Aguilar MD  3/27/2024 1:26 PM EDT  Workstation ID: [id number]     I reviewed the patient's new clinical results.    ________________________________________________________     PROBLEM LIST:    Food impaction of esophagus    Dysphagia            ASSESSMENT/PLAN:  This is a very interesting 88-year-old gentleman with significant ptosis and dysarthria and dysphagia    His condition has been getting worse since admission    His initial admission was for impacted food bolus which spontaneously moved    He does have some esophageal stricture and gastritis or esophagitis but this all is new and the ptosis is concerning    He has no other signs of neuromuscular condition    So we will start with stroke workup and go from there he was obviously not TNK or intervention candidate so far        Further workup in progress    May need neuromuscular workup    The timing and the duration does not really fit a particular criteria    Thompson Adkins variant of GBS??    Modification of stroke risk factors:   - Blood pressure should be less than 130/80 outpatient, HbA1c less than 6.5, LDL less than 70; b12>500 and smoking cessation if applicable. We would be grateful if the primary team / primary care physician would keep a close watch on the above targets.  - Stroke education  - Follow up with neurologist of choice      I discussed the patient's findings and my recommendations with patient, nursing staff, and primary care team    Forrest Cortes MD  03/28/24  14:57 EDT        Addendum    This patient right now is n.p.o. anyway    Also he has significant gastritis or esophagitis so I will hold any antiplatelet medication and lipid-lowering medication till the workup is completed

## 2024-03-29 ENCOUNTER — INPATIENT HOSPITAL (AMBULATORY)
Dept: URBAN - METROPOLITAN AREA HOSPITAL 84 | Facility: HOSPITAL | Age: 89
End: 2024-03-29

## 2024-03-29 ENCOUNTER — APPOINTMENT (OUTPATIENT)
Dept: CARDIOLOGY | Facility: HOSPITAL | Age: 89
DRG: 393 | End: 2024-03-29
Payer: MEDICARE

## 2024-03-29 ENCOUNTER — ANESTHESIA (OUTPATIENT)
Dept: GASTROENTEROLOGY | Facility: HOSPITAL | Age: 89
End: 2024-03-29
Payer: MEDICARE

## 2024-03-29 ENCOUNTER — ANESTHESIA EVENT (OUTPATIENT)
Dept: GASTROENTEROLOGY | Facility: HOSPITAL | Age: 89
End: 2024-03-29
Payer: MEDICARE

## 2024-03-29 DIAGNOSIS — K44.9 DIAPHRAGMATIC HERNIA WITHOUT OBSTRUCTION OR GANGRENE: ICD-10-CM

## 2024-03-29 DIAGNOSIS — R13.10 DYSPHAGIA, UNSPECIFIED: ICD-10-CM

## 2024-03-29 DIAGNOSIS — K22.89 OTHER SPECIFIED DISEASE OF ESOPHAGUS: ICD-10-CM

## 2024-03-29 DIAGNOSIS — R63.30 FEEDING DIFFICULTIES, UNSPECIFIED: ICD-10-CM

## 2024-03-29 LAB
ANION GAP SERPL CALCULATED.3IONS-SCNC: 11 MMOL/L (ref 5–15)
BASOPHILS # BLD AUTO: 0.06 10*3/MM3 (ref 0–0.2)
BASOPHILS NFR BLD AUTO: 0.5 % (ref 0–1.5)
BH CV ECHO LEFT VENTRICLE GLOBAL LONGITUDINAL STRAIN: -17.5 %
BH CV ECHO MEAS - ACS: 1.9 CM
BH CV ECHO MEAS - AO MAX PG: 7 MMHG
BH CV ECHO MEAS - AO MEAN PG: 4 MMHG
BH CV ECHO MEAS - AO ROOT DIAM: 3.7 CM
BH CV ECHO MEAS - AO V2 MAX: 132 CM/SEC
BH CV ECHO MEAS - AO V2 VTI: 27.2 CM
BH CV ECHO MEAS - AVA(I,D): 2.7 CM2
BH CV ECHO MEAS - EDV(CUBED): 59.3 ML
BH CV ECHO MEAS - EDV(MOD-SP2): 92.8 ML
BH CV ECHO MEAS - EDV(MOD-SP4): 66 ML
BH CV ECHO MEAS - EF(MOD-BP): 62.2 %
BH CV ECHO MEAS - EF(MOD-SP2): 64.9 %
BH CV ECHO MEAS - EF(MOD-SP4): 57.4 %
BH CV ECHO MEAS - ESV(CUBED): 15.6 ML
BH CV ECHO MEAS - ESV(MOD-SP2): 32.6 ML
BH CV ECHO MEAS - ESV(MOD-SP4): 28.1 ML
BH CV ECHO MEAS - FS: 35.9 %
BH CV ECHO MEAS - IVS/LVPW: 1.1 CM
BH CV ECHO MEAS - IVSD: 1.1 CM
BH CV ECHO MEAS - LA DIMENSION: 3.8 CM
BH CV ECHO MEAS - LAT PEAK E' VEL: 8.2 CM/SEC
BH CV ECHO MEAS - LV DIASTOLIC VOL/BSA (35-75): 33.8 CM2
BH CV ECHO MEAS - LV MASS(C)D: 131 GRAMS
BH CV ECHO MEAS - LV MAX PG: 6.2 MMHG
BH CV ECHO MEAS - LV MEAN PG: 3 MMHG
BH CV ECHO MEAS - LV SYSTOLIC VOL/BSA (12-30): 14.4 CM2
BH CV ECHO MEAS - LV V1 MAX: 124 CM/SEC
BH CV ECHO MEAS - LV V1 VTI: 25.7 CM
BH CV ECHO MEAS - LVIDD: 3.9 CM
BH CV ECHO MEAS - LVIDS: 2.5 CM
BH CV ECHO MEAS - LVOT AREA: 2.8 CM2
BH CV ECHO MEAS - LVOT DIAM: 1.9 CM
BH CV ECHO MEAS - LVPWD: 1 CM
BH CV ECHO MEAS - MED PEAK E' VEL: 6.9 CM/SEC
BH CV ECHO MEAS - MR MAX PG: 86.1 MMHG
BH CV ECHO MEAS - MR MAX VEL: 464 CM/SEC
BH CV ECHO MEAS - MV A MAX VEL: 108 CM/SEC
BH CV ECHO MEAS - MV DEC SLOPE: 288 CM/SEC2
BH CV ECHO MEAS - MV DEC TIME: 0.26 SEC
BH CV ECHO MEAS - MV E MAX VEL: 70.7 CM/SEC
BH CV ECHO MEAS - MV E/A: 0.65
BH CV ECHO MEAS - MV MAX PG: 4.2 MMHG
BH CV ECHO MEAS - MV MEAN PG: 2 MMHG
BH CV ECHO MEAS - MV P1/2T: 79.7 MSEC
BH CV ECHO MEAS - MV V2 VTI: 22.4 CM
BH CV ECHO MEAS - MVA(P1/2T): 2.8 CM2
BH CV ECHO MEAS - MVA(VTI): 3.3 CM2
BH CV ECHO MEAS - PA ACC TIME: 0.09 SEC
BH CV ECHO MEAS - PA V2 MAX: 95.5 CM/SEC
BH CV ECHO MEAS - PI END-D VEL: 135 CM/SEC
BH CV ECHO MEAS - PULM A REVS DUR: 0.12 SEC
BH CV ECHO MEAS - PULM A REVS VEL: 35.5 CM/SEC
BH CV ECHO MEAS - PULM DIAS VEL: 32 CM/SEC
BH CV ECHO MEAS - PULM S/D: 1.58
BH CV ECHO MEAS - PULM SYS VEL: 50.7 CM/SEC
BH CV ECHO MEAS - QP/QS: 0.92
BH CV ECHO MEAS - RAP SYSTOLE: 3 MMHG
BH CV ECHO MEAS - RV MAX PG: 2.25 MMHG
BH CV ECHO MEAS - RV V1 MAX: 75 CM/SEC
BH CV ECHO MEAS - RV V1 VTI: 14.8 CM
BH CV ECHO MEAS - RVDD: 2.6 CM
BH CV ECHO MEAS - RVOT DIAM: 2.4 CM
BH CV ECHO MEAS - RVSP: 32 MMHG
BH CV ECHO MEAS - SI(MOD-SP2): 30.9 ML/M2
BH CV ECHO MEAS - SI(MOD-SP4): 19.4 ML/M2
BH CV ECHO MEAS - SV(LVOT): 72.9 ML
BH CV ECHO MEAS - SV(MOD-SP2): 60.2 ML
BH CV ECHO MEAS - SV(MOD-SP4): 37.9 ML
BH CV ECHO MEAS - SV(RVOT): 67 ML
BH CV ECHO MEAS - TAPSE (>1.6): 2.3 CM
BH CV ECHO MEAS - TR MAX PG: 28.5 MMHG
BH CV ECHO MEAS - TR MAX VEL: 267 CM/SEC
BH CV ECHO MEASUREMENTS AVERAGE E/E' RATIO: 9.36
BH CV XLRA - RV BASE: 3.3 CM
BH CV XLRA - RV LENGTH: 6.5 CM
BH CV XLRA - RV MID: 2.6 CM
BH CV XLRA - TDI S': 15.9 CM/SEC
BUN SERPL-MCNC: 23 MG/DL (ref 8–23)
BUN/CREAT SERPL: 15.8 (ref 7–25)
CALCIUM SPEC-SCNC: 9 MG/DL (ref 8.6–10.5)
CHLORIDE SERPL-SCNC: 107 MMOL/L (ref 98–107)
CO2 SERPL-SCNC: 24 MMOL/L (ref 22–29)
CREAT SERPL-MCNC: 1.46 MG/DL (ref 0.76–1.27)
DEPRECATED RDW RBC AUTO: 39.8 FL (ref 37–54)
EGFRCR SERPLBLD CKD-EPI 2021: 46 ML/MIN/1.73
EOSINOPHIL # BLD AUTO: 0.01 10*3/MM3 (ref 0–0.4)
EOSINOPHIL NFR BLD AUTO: 0.1 % (ref 0.3–6.2)
ERYTHROCYTE [DISTWIDTH] IN BLOOD BY AUTOMATED COUNT: 12.6 % (ref 12.3–15.4)
GLUCOSE BLDC GLUCOMTR-MCNC: 124 MG/DL (ref 70–105)
GLUCOSE BLDC GLUCOMTR-MCNC: 200 MG/DL (ref 70–105)
GLUCOSE BLDC GLUCOMTR-MCNC: 69 MG/DL (ref 70–105)
GLUCOSE SERPL-MCNC: 78 MG/DL (ref 65–99)
HCT VFR BLD AUTO: 44.4 % (ref 37.5–51)
HGB BLD-MCNC: 14.4 G/DL (ref 13–17.7)
IMM GRANULOCYTES # BLD AUTO: 0.04 10*3/MM3 (ref 0–0.05)
IMM GRANULOCYTES NFR BLD AUTO: 0.3 % (ref 0–0.5)
INR PPP: 1.07 (ref 0.93–1.1)
LEFT ATRIUM VOLUME INDEX: 19.6 ML/M2
LYMPHOCYTES # BLD AUTO: 0.76 10*3/MM3 (ref 0.7–3.1)
LYMPHOCYTES NFR BLD AUTO: 6 % (ref 19.6–45.3)
MCH RBC QN AUTO: 28.1 PG (ref 26.6–33)
MCHC RBC AUTO-ENTMCNC: 32.4 G/DL (ref 31.5–35.7)
MCV RBC AUTO: 86.7 FL (ref 79–97)
MONOCYTES # BLD AUTO: 0.71 10*3/MM3 (ref 0.1–0.9)
MONOCYTES NFR BLD AUTO: 5.6 % (ref 5–12)
NEUTROPHILS NFR BLD AUTO: 11.16 10*3/MM3 (ref 1.7–7)
NEUTROPHILS NFR BLD AUTO: 87.5 % (ref 42.7–76)
NRBC BLD AUTO-RTO: 0 /100 WBC (ref 0–0.2)
PLATELET # BLD AUTO: 239 10*3/MM3 (ref 140–450)
PMV BLD AUTO: 11.2 FL (ref 6–12)
POTASSIUM SERPL-SCNC: 3.8 MMOL/L (ref 3.5–5.2)
PROTHROMBIN TIME: 11.6 SECONDS (ref 9.6–11.7)
RBC # BLD AUTO: 5.12 10*6/MM3 (ref 4.14–5.8)
SINUS: 3.6 CM
SODIUM SERPL-SCNC: 142 MMOL/L (ref 136–145)
STJ: 2.6 CM
WBC NRBC COR # BLD AUTO: 12.74 10*3/MM3 (ref 3.4–10.8)

## 2024-03-29 PROCEDURE — 93306 TTE W/DOPPLER COMPLETE: CPT | Performed by: INTERNAL MEDICINE

## 2024-03-29 PROCEDURE — 85025 COMPLETE CBC W/AUTO DIFF WBC: CPT | Performed by: INTERNAL MEDICINE

## 2024-03-29 PROCEDURE — 3E0G76Z INTRODUCTION OF NUTRITIONAL SUBSTANCE INTO UPPER GI, VIA NATURAL OR ARTIFICIAL OPENING: ICD-10-PCS | Performed by: INTERNAL MEDICINE

## 2024-03-29 PROCEDURE — 82948 REAGENT STRIP/BLOOD GLUCOSE: CPT

## 2024-03-29 PROCEDURE — 25010000002 PROPOFOL 200 MG/20ML EMULSION: Performed by: NURSE ANESTHETIST, CERTIFIED REGISTERED

## 2024-03-29 PROCEDURE — 97535 SELF CARE MNGMENT TRAINING: CPT

## 2024-03-29 PROCEDURE — 97530 THERAPEUTIC ACTIVITIES: CPT

## 2024-03-29 PROCEDURE — 93306 TTE W/DOPPLER COMPLETE: CPT

## 2024-03-29 PROCEDURE — 25010000002 CYANOCOBALAMIN PER 1000 MCG: Performed by: NURSE PRACTITIONER

## 2024-03-29 PROCEDURE — 97112 NEUROMUSCULAR REEDUCATION: CPT

## 2024-03-29 PROCEDURE — 43246 EGD PLACE GASTROSTOMY TUBE: CPT | Performed by: INTERNAL MEDICINE

## 2024-03-29 PROCEDURE — 97166 OT EVAL MOD COMPLEX 45 MIN: CPT

## 2024-03-29 PROCEDURE — 93356 MYOCRD STRAIN IMG SPCKL TRCK: CPT | Performed by: INTERNAL MEDICINE

## 2024-03-29 PROCEDURE — 85610 PROTHROMBIN TIME: CPT | Performed by: NURSE PRACTITIONER

## 2024-03-29 PROCEDURE — 25810000003 SODIUM CHLORIDE 0.9 % SOLUTION: Performed by: NURSE ANESTHETIST, CERTIFIED REGISTERED

## 2024-03-29 PROCEDURE — 25810000003 LACTATED RINGERS PER 1000 ML: Performed by: STUDENT IN AN ORGANIZED HEALTH CARE EDUCATION/TRAINING PROGRAM

## 2024-03-29 PROCEDURE — 97116 GAIT TRAINING THERAPY: CPT

## 2024-03-29 PROCEDURE — 92526 ORAL FUNCTION THERAPY: CPT

## 2024-03-29 PROCEDURE — 93356 MYOCRD STRAIN IMG SPCKL TRCK: CPT

## 2024-03-29 PROCEDURE — 0DH63UZ INSERTION OF FEEDING DEVICE INTO STOMACH, PERCUTANEOUS APPROACH: ICD-10-PCS | Performed by: INTERNAL MEDICINE

## 2024-03-29 PROCEDURE — 80048 BASIC METABOLIC PNL TOTAL CA: CPT | Performed by: INTERNAL MEDICINE

## 2024-03-29 PROCEDURE — 25010000002 AMPICILLIN-SULBACTAM PER 1.5 G: Performed by: INTERNAL MEDICINE

## 2024-03-29 RX ORDER — IPRATROPIUM BROMIDE AND ALBUTEROL SULFATE 2.5; .5 MG/3ML; MG/3ML
3 SOLUTION RESPIRATORY (INHALATION) ONCE AS NEEDED
Status: DISCONTINUED | OUTPATIENT
Start: 2024-03-29 | End: 2024-03-29 | Stop reason: HOSPADM

## 2024-03-29 RX ORDER — HYDRALAZINE HYDROCHLORIDE 20 MG/ML
5 INJECTION INTRAMUSCULAR; INTRAVENOUS
Status: DISCONTINUED | OUTPATIENT
Start: 2024-03-29 | End: 2024-03-29 | Stop reason: HOSPADM

## 2024-03-29 RX ORDER — SODIUM CHLORIDE 0.9 % (FLUSH) 0.9 %
10 SYRINGE (ML) INJECTION EVERY 12 HOURS SCHEDULED
Status: DISCONTINUED | OUTPATIENT
Start: 2024-03-29 | End: 2024-03-29 | Stop reason: HOSPADM

## 2024-03-29 RX ORDER — ONDANSETRON 4 MG/1
4 TABLET, ORALLY DISINTEGRATING ORAL EVERY 6 HOURS PRN
Status: CANCELLED | OUTPATIENT
Start: 2024-03-29

## 2024-03-29 RX ORDER — DEXTROSE MONOHYDRATE 25 G/50ML
INJECTION, SOLUTION INTRAVENOUS
Status: COMPLETED
Start: 2024-03-29 | End: 2024-03-29

## 2024-03-29 RX ORDER — SODIUM CHLORIDE 9 MG/ML
9 INJECTION, SOLUTION INTRAVENOUS CONTINUOUS PRN
Status: DISCONTINUED | OUTPATIENT
Start: 2024-03-29 | End: 2024-03-31 | Stop reason: HOSPADM

## 2024-03-29 RX ORDER — PROPOFOL 10 MG/ML
INJECTION, EMULSION INTRAVENOUS AS NEEDED
Status: DISCONTINUED | OUTPATIENT
Start: 2024-03-29 | End: 2024-03-29 | Stop reason: SURG

## 2024-03-29 RX ORDER — ONDANSETRON 2 MG/ML
4 INJECTION INTRAMUSCULAR; INTRAVENOUS EVERY 6 HOURS PRN
Status: CANCELLED | OUTPATIENT
Start: 2024-03-29

## 2024-03-29 RX ORDER — SODIUM CHLORIDE 9 MG/ML
INJECTION, SOLUTION INTRAVENOUS CONTINUOUS PRN
Status: DISCONTINUED | OUTPATIENT
Start: 2024-03-29 | End: 2024-03-29 | Stop reason: SURG

## 2024-03-29 RX ORDER — NICOTINE POLACRILEX 4 MG
15 LOZENGE BUCCAL
Status: DISCONTINUED | OUTPATIENT
Start: 2024-03-29 | End: 2024-03-31 | Stop reason: HOSPADM

## 2024-03-29 RX ORDER — POLYETHYLENE GLYCOL 3350 17 G/17G
17 POWDER, FOR SOLUTION ORAL DAILY
Status: DISCONTINUED | OUTPATIENT
Start: 2024-03-29 | End: 2024-03-30

## 2024-03-29 RX ORDER — LABETALOL HYDROCHLORIDE 5 MG/ML
5 INJECTION, SOLUTION INTRAVENOUS
Status: DISCONTINUED | OUTPATIENT
Start: 2024-03-29 | End: 2024-03-29 | Stop reason: HOSPADM

## 2024-03-29 RX ORDER — DEXTROSE MONOHYDRATE 25 G/50ML
25 INJECTION, SOLUTION INTRAVENOUS
Status: DISCONTINUED | OUTPATIENT
Start: 2024-03-29 | End: 2024-03-31 | Stop reason: HOSPADM

## 2024-03-29 RX ORDER — SODIUM CHLORIDE 0.9 % (FLUSH) 0.9 %
10 SYRINGE (ML) INJECTION AS NEEDED
Status: DISCONTINUED | OUTPATIENT
Start: 2024-03-29 | End: 2024-03-29 | Stop reason: HOSPADM

## 2024-03-29 RX ORDER — EPHEDRINE SULFATE 5 MG/ML
5 INJECTION INTRAVENOUS ONCE AS NEEDED
Status: DISCONTINUED | OUTPATIENT
Start: 2024-03-29 | End: 2024-03-29 | Stop reason: HOSPADM

## 2024-03-29 RX ORDER — LIDOCAINE HYDROCHLORIDE 10 MG/ML
INJECTION, SOLUTION EPIDURAL; INFILTRATION; INTRACAUDAL; PERINEURAL AS NEEDED
Status: DISCONTINUED | OUTPATIENT
Start: 2024-03-29 | End: 2024-03-29 | Stop reason: SURG

## 2024-03-29 RX ORDER — DEXTROSE AND SODIUM CHLORIDE 5; .45 G/100ML; G/100ML
50 INJECTION, SOLUTION INTRAVENOUS CONTINUOUS
Status: DISCONTINUED | OUTPATIENT
Start: 2024-03-29 | End: 2024-03-31 | Stop reason: HOSPADM

## 2024-03-29 RX ORDER — IBUPROFEN 600 MG/1
1 TABLET ORAL
Status: DISCONTINUED | OUTPATIENT
Start: 2024-03-29 | End: 2024-03-31 | Stop reason: HOSPADM

## 2024-03-29 RX ORDER — CYANOCOBALAMIN 1000 UG/ML
1000 INJECTION, SOLUTION INTRAMUSCULAR; SUBCUTANEOUS DAILY
Status: DISCONTINUED | OUTPATIENT
Start: 2024-03-29 | End: 2024-03-31 | Stop reason: HOSPADM

## 2024-03-29 RX ORDER — ONDANSETRON 2 MG/ML
4 INJECTION INTRAMUSCULAR; INTRAVENOUS ONCE AS NEEDED
Status: DISCONTINUED | OUTPATIENT
Start: 2024-03-29 | End: 2024-03-29 | Stop reason: HOSPADM

## 2024-03-29 RX ADMIN — PROPOFOL 30 MG: 10 INJECTION, EMULSION INTRAVENOUS at 17:29

## 2024-03-29 RX ADMIN — PROPOFOL 40 MG: 10 INJECTION, EMULSION INTRAVENOUS at 17:25

## 2024-03-29 RX ADMIN — Medication 10 ML: at 22:40

## 2024-03-29 RX ADMIN — DEXTROSE AND SODIUM CHLORIDE 50 ML/HR: 5; 450 INJECTION, SOLUTION INTRAVENOUS at 22:30

## 2024-03-29 RX ADMIN — AMPICILLIN SODIUM AND SULBACTAM SODIUM 3 G: 2; 1 INJECTION, POWDER, FOR SOLUTION INTRAMUSCULAR; INTRAVENOUS at 12:11

## 2024-03-29 RX ADMIN — DEXTROSE MONOHYDRATE 25 G: 25 INJECTION, SOLUTION INTRAVENOUS at 22:22

## 2024-03-29 RX ADMIN — LIDOCAINE HYDROCHLORIDE 40 MG: 10 INJECTION, SOLUTION EPIDURAL; INFILTRATION; INTRACAUDAL; PERINEURAL at 17:24

## 2024-03-29 RX ADMIN — PROPOFOL 30 MG: 10 INJECTION, EMULSION INTRAVENOUS at 17:33

## 2024-03-29 RX ADMIN — AMPICILLIN SODIUM AND SULBACTAM SODIUM 3 G: 2; 1 INJECTION, POWDER, FOR SOLUTION INTRAMUSCULAR; INTRAVENOUS at 23:14

## 2024-03-29 RX ADMIN — SODIUM CHLORIDE, POTASSIUM CHLORIDE, SODIUM LACTATE AND CALCIUM CHLORIDE 75 ML/HR: 600; 310; 30; 20 INJECTION, SOLUTION INTRAVENOUS at 08:23

## 2024-03-29 RX ADMIN — AMPICILLIN SODIUM AND SULBACTAM SODIUM 3 G: 2; 1 INJECTION, POWDER, FOR SOLUTION INTRAMUSCULAR; INTRAVENOUS at 04:55

## 2024-03-29 RX ADMIN — PROPOFOL 30 MG: 10 INJECTION, EMULSION INTRAVENOUS at 17:37

## 2024-03-29 RX ADMIN — SODIUM CHLORIDE: 9 INJECTION, SOLUTION INTRAVENOUS at 17:14

## 2024-03-29 RX ADMIN — Medication 10 ML: at 08:23

## 2024-03-29 RX ADMIN — CYANOCOBALAMIN 1000 MCG: 1000 INJECTION, SOLUTION INTRAMUSCULAR; SUBCUTANEOUS at 12:11

## 2024-03-29 RX ADMIN — AMPICILLIN SODIUM AND SULBACTAM SODIUM 3 G: 2; 1 INJECTION, POWDER, FOR SOLUTION INTRAMUSCULAR; INTRAVENOUS at 17:46

## 2024-03-29 RX ADMIN — PROPOFOL 50 MG: 10 INJECTION, EMULSION INTRAVENOUS at 17:24

## 2024-03-29 NOTE — OP NOTE
ESOPHAGOGASTRODUODENOSCOPY WITH PERCUTANEOUS ENDOSCOPIC GASTROSTOMY TUBE INSERTION Procedure Report    Patient Name:  Hever Guallpa  YOB: 1935    Date of Surgery:  3/29/2024     Pre-Op Diagnosis:  Dysphagia, unspecified type [R13.10]  Feeding abnormality       Post Op Diagnosis:  Small hiatal hernia, gastric erosion      Procedure/CPT® Codes:      Procedure(s):  ESOPHAGOGASTRODUODENOSCOPY WITH PERCUTANEOUS ENDOSCOPIC GASTROSTOMY TUBE INSERTION    Staff:  Surgeon(s):  Shakir Ferreira MD         Anesthesia: Monitored Anesthesia Care  Antibiotics: Patient is on Unasyn  Lidocaine 1% x 5cc    Implants:    20F Push type PEG tube    Specimen:        See Below    No blood loss    Complications:  None     Description of Procedure:  Informed consent was obtained for the procedure, including sedation.  Risks of perforation, hemorrhage, adverse drug reaction and aspiration were discussed with patient or next of kin.  The patient was brought into the endoscopy suite. Continuous cardiopulmonary monitoring was performed. The patient remained in the supine position.  The bite block was inserted into the patient's mouth. After adequate sedation was attained, the Olympus gastroscope was inserted into the patient's mouth and advanced to the second portion of the duodenum without difficulty.  Circumferential examination was performed. A retroflex exam was performed in the patient's stomach.  On completion of the exam, the bowel was decompressed, the scope was removed from the patient, the patient tolerated the procedure well, there were no immediate post-operative complications.     Examination of the esophagus showed normal mucosa and small hiatal hernia  Examination of the stomach showed antral erosion  Retroflex examination of the stomach was normal   Examination of the duodenum showed normal mucosa    The location for appropriate PEG tube placement was performed with palpation technique.  Subsequently the  skin was prepped with chlorhexidine.  Next the lidocaine syringe was utilized for topical anesthesia and safe track technique x2.  Subsequently a scalpel was utilized create a 1 cm incision.  Next the trocar was inserted through the incision under direct visualization with the gastroscope.  Next the wire was inserted through the trocar and withdrawn out of the mouth with the use of the snare.  Subsequently 20 French push type PEG tube was placed.  Repeat gastroscopy was performed to confirm the internal bumper was in good position.  The external bumper was located at 6cm at the skin.  The dressing was applied.            Recommendations:  Keep external bumper at 6cm  Okay to use PEG tube at midnight tonight or in a.m.  Daily dressing changes with soap and water starting tomorrow  Call GI for questions or problems with the PEG tube.  We will see as needed.  Keep the head of the bed elevated while tube feeds are infusing  Okay for daily aspirin  If patient is felt to have an acute CVA, okay for anticoagulation starting tomorrow but if CVA is not felt to be acute, would recommend no other anticoagulation for 3 days.      Shakir Ferreira MD     Date: 3/29/2024  Time: 17:43 EDT

## 2024-03-29 NOTE — CONSULTS
"Nutrition Services    Patient Name: Hever Guallpa  YOB: 1935  MRN: [id number]  Admission date: 3/26/2024    EN Prescription: When enteral access obtained, recommend starting with Isosource 1.5 @ 30mL/hour + 30mL/hour water flush.     CLINICAL NUTRITION ASSESSMENT      Reason for Assessment 3/29: Tube feeding assessment consult      H&P      Past Medical History:   Diagnosis Date    Hyperlipidemia     Hypertension     Neck pain     Osteopenia     Sleep disorder     Thrombocytopenia     Vitamin D deficiency        Past Surgical History:   Procedure Laterality Date    COLONOSCOPY      CYST REMOVAL      ENDOSCOPY N/A 3/27/2024    Procedure: ESOPHAGOGASTRODUODENOSCOPY with non-guided esophageal dilation using 52 Fr. Bougie's;  Surgeon: Shakir Ferreira MD;  Location: [facility] ENDOSCOPY;  Service: Gastroenterology;  Laterality: N/A;  gastric erosions        Current Problems   Food impaction of the esophagus   -- GI following -- EGD done 3/27   -- esophageal dilation performed, pt still unable to swallow     EGD/PEG planned for 3/29      Encounter Information        Trending Narrative     3/29: Received consult for tube feeding assessment. Pt with food impaction in the esophagus this admission. Esophagus was dilated, but pt continues with significant swallowing difficulty and GI is now planning PEG placement for long-term nutrition support. Prior to this admission, pt was on regular-type diet.     Anthropometrics        Current Height, Weight Height: 172.7 cm (68\")  Weight: 81.6 kg (179 lb 14.3 oz) (03/26/24 2314)       Usual Body Weight (UBW) ~180 lb       Trending Weight Hx     This admission: 3/29: Scale weight 81.6 kg              PTA: 3/29: No extended documented weight Hx     Wt Readings from Last 30 Encounters:   03/26/24 2314 81.6 kg (179 lb 14.3 oz)   03/26/24 2133 81.6 kg (179 lb 14.3 oz)   11/21/19 1017 84.4 kg (186 lb)      BMI kg/m2 Body mass index is 27.35 kg/m².       Labs        Pertinent " Labs    Results from last 7 days   Lab Units 03/29/24  0503 03/28/24  0439 03/27/24  0425   SODIUM mmol/L 142 142 142   POTASSIUM mmol/L 3.8 4.2 3.8   CHLORIDE mmol/L 107 105 106   CO2 mmol/L 24.0 27.0 25.0   BUN mg/dL 23 18 18   CREATININE mg/dL 1.46* 1.44* 1.28*   CALCIUM mg/dL 9.0 9.3 9.2   GLUCOSE mg/dL 78 95 90     Results from last 7 days   Lab Units 03/29/24  0020 03/28/24  1550   HEMOGLOBIN g/dL 14.4  --    HEMATOCRIT % 44.4  --    TRIGLYCERIDES mg/dL  --  55     Lab Results   Component Value Date    HGBA1C 5.60 03/28/2024        Medications    Scheduled Medications ampicillin-sulbactam, 3 g, Intravenous, Q6H  cyanocobalamin, 1,000 mcg, Intramuscular, Daily  guaiFENesin-codeine, 10 mL, Oral, TID  polyethylene glycol, 17 g, Oral, Daily  sodium chloride, 10 mL, Intravenous, Q12H        Infusions lactated ringers, 75 mL/hr, Last Rate: 75 mL/hr (03/29/24 0823)        PRN Medications   acetaminophen    senna-docusate sodium **AND** polyethylene glycol **AND** bisacodyl **AND** bisacodyl    melatonin    ondansetron ODT **OR** ondansetron    [COMPLETED] Insert Peripheral IV **AND** sodium chloride    sodium chloride    sodium chloride     Physical Findings        Trending Physical   Appearance, NFPE 3/29: NFPE completed and not consistent with nutrition diagnosis of malnutrition at this time using AND/ASPEN criteria      --  Edema  None documented      Bowel Function BM 3/27     Tubes No feeding tube in place      Chewing/Swallowing Significant swallowing difficulty despite esophageal dilation     Skin No breakdown documented        Estimated/Assessed Needs    Assessed 3/29/24   Energy Requirements    EST Needs, Method, Wt used 2040 kcal (25 kcal/kg)        Protein Requirements    EST Needs, Method, Wt used  g/day (1-1.3 g/kg BW)       Fluid Requirements     Estimated Needs (mL/day) 1mL/kcal - monitor hydration status      Current Nutrition Orders & Evaluation of Intake       Oral Nutrition     Food Allergies  NKFA   Current PO Diet NPO Diet NPO Type: Strict NPO   Supplement None ordered   PO Evaluation     Trending % PO Intake NPO     Enteral Nutrition    Enteral Route PEG is planned -- does not yet have access    Order, Modulars, Flushes    Residual/Tolerance    TF Observation         Parenteral Nutrition     TPN Route    Total # Days on TPN    TPN Order, Lipid Details    MVI & Trace Element Freq    TPN Observation       Nutritional Risk Screening        NRS-2002 Score          Nutrition Diagnosis         Nutrition Dx Problem 1 Swallowing difficulty R/t ongoing dysphagia despite esophageal dilation; as evidenced by clinical course and need for PEG placement.       Nutrition Dx Problem 2        Intervention Goal         Intervention Goal(s) Pt tolerating EN well when initiated      Nutrition Intervention        RD Action Will start TF per consult      Nutrition Prescription          Diet Prescription NPO   Supplement Prescription      Enteral Prescription Initial Goal:  *initial goal conservative d/t risk of RFS     Isosource 1.5 at 30mL/hr + 30mL/hr water flush      End Goal:  Isosource 1.5 at 65 mL/hr; will adjust water flush per clinical picture     Calories  2145 kcals (105%)    Protein  97 g (100% in range)    Free water  1087 mL   Flushes       The above end goal rate is for 22 hrs/day to assume interruptions for ADLs. Water flushes adjusted based on clinical picture + Rx flushes/IV fluids          TPN Prescription      Monitor/Evaluation        Monitor I&O, Pertinent labs, EN delivery/tolerance, Weight, Skin status, GI status, POC/GOC     Electronically signed by:  Nicole Amaya RD  03/29/24 11:54 EDT

## 2024-03-29 NOTE — PLAN OF CARE
Problem: Adult Inpatient Plan of Care  Goal: Plan of Care Review  Outcome: Ongoing, Progressing  Goal: Patient-Specific Goal (Individualized)  Outcome: Ongoing, Progressing  Goal: Absence of Hospital-Acquired Illness or Injury  Outcome: Ongoing, Progressing  Intervention: Identify and Manage Fall Risk  Recent Flowsheet Documentation  Taken 3/29/2024 1700 by Gladis Orozco RN  Safety Promotion/Fall Prevention: patient off unit  Taken 3/29/2024 1600 by Gladis Orozco RN  Safety Promotion/Fall Prevention: patient off unit  Taken 3/29/2024 1415 by Gladis Orozco RN  Safety Promotion/Fall Prevention: (Patient left for Endo for PEG) patient off unit  Taken 3/29/2024 1400 by Gladis Orozco RN  Safety Promotion/Fall Prevention: safety round/check completed  Taken 3/29/2024 1200 by Gladis Orozco RN  Safety Promotion/Fall Prevention:   assistive device/personal items within reach   clutter free environment maintained   fall prevention program maintained   nonskid shoes/slippers when out of bed   room organization consistent   safety round/check completed  Taken 3/29/2024 1000 by Gladis Orozco RN  Safety Promotion/Fall Prevention: safety round/check completed  Taken 3/29/2024 0800 by Gladis Orozco RN  Safety Promotion/Fall Prevention:   assistive device/personal items within reach   clutter free environment maintained   fall prevention program maintained   nonskid shoes/slippers when out of bed   room organization consistent   safety round/check completed  Intervention: Prevent Skin Injury  Recent Flowsheet Documentation  Taken 3/29/2024 1200 by Gladis Orozco RN  Body Position: position changed independently  Taken 3/29/2024 0800 by Gladis Orozco RN  Body Position: position changed independently  Intervention: Prevent and Manage VTE (Venous Thromboembolism) Risk  Recent Flowsheet Documentation  Taken 3/29/2024 1200 by Gladis Orozco RN  Activity Management: activity  encouraged  Taken 3/29/2024 0800 by Gladis Orozco RN  Activity Management: activity encouraged  VTE Prevention/Management:   bilateral   sequential compression devices on  Intervention: Prevent Infection  Recent Flowsheet Documentation  Taken 3/29/2024 1200 by Gladis Orozco, RN  Infection Prevention: hand hygiene promoted  Taken 3/29/2024 0800 by Gladis Orozco RN  Infection Prevention: hand hygiene promoted  Goal: Optimal Comfort and Wellbeing  Outcome: Ongoing, Progressing  Goal: Readiness for Transition of Care  Outcome: Ongoing, Progressing   Goal Outcome Evaluation:         Patient had an echocardiogram and an EGD with PEG placement today. The patient did not have any complaints of pain and remains on room air. Will continue to monitor.

## 2024-03-29 NOTE — PLAN OF CARE
Assessment: Hever Guallpa is progressing quickly toward all PT goals. He demos improved balance and gait today compared to yesterday. Pt  presents with balance, gait,  functional mobility impairments which indicate the need for skilled intervention. Pt is highly motivated and works to fatigue. He is an ideal candidate for AIR. Tolerating session today without incident. Will continue to follow and progress as tolerated.

## 2024-03-29 NOTE — PROGRESS NOTES
LOS: 2 days   Patient Care Team:  Maddie Martinez MD as PCP - General (Family Medicine)  Jayy Kaur MD as Consulting Physician (Urology)  Renae Lucio MD as Consulting Physician (Dermatology)      Subjective     Interval History:     Subjective: Patient with no new complaints.  Continues to deny chest pain or abdominal pain.  No bowel movement overnight.      ROS:   No chest pain, shortness of breath, or cough.        Medication Review:     Current Facility-Administered Medications:     acetaminophen (TYLENOL) tablet 650 mg, 650 mg, Oral, Q4H PRN, Shakir Ferreira MD    ampicillin-sulbactam (UNASYN) 3 g in sodium chloride 0.9 % 100 mL MBP, 3 g, Intravenous, Q6H, Natasha Holguin MD, 3 g at 03/29/24 0455    sennosides-docusate (PERICOLACE) 8.6-50 MG per tablet 2 tablet, 2 tablet, Oral, BID PRN **AND** polyethylene glycol (MIRALAX) packet 17 g, 17 g, Oral, Daily PRN **AND** bisacodyl (DULCOLAX) EC tablet 5 mg, 5 mg, Oral, Daily PRN **AND** bisacodyl (DULCOLAX) suppository 10 mg, 10 mg, Rectal, Daily PRN, Shakir Ferreira MD    guaiFENesin-codeine (GUAIFENESIN AC) 100-10 MG/5ML liquid 10 mL, 10 mL, Oral, TID, Natasha Holguin MD    lactated ringers infusion, 75 mL/hr, Intravenous, Continuous, Janelle Cárdenas, DO, Last Rate: 75 mL/hr at 03/29/24 0823, 75 mL/hr at 03/29/24 0823    melatonin tablet 5 mg, 5 mg, Oral, Nightly PRN, Shakir Ferreira MD    ondansetron ODT (ZOFRAN-ODT) disintegrating tablet 4 mg, 4 mg, Oral, Q6H PRN **OR** ondansetron (ZOFRAN) injection 4 mg, 4 mg, Intravenous, Q6H PRN, Shakir Ferreira MD    [COMPLETED] Insert Peripheral IV, , , Once **AND** sodium chloride 0.9 % flush 10 mL, 10 mL, Intravenous, PRN, Shakir Ferreira MD    sodium chloride 0.9 % flush 10 mL, 10 mL, Intravenous, Q12H, Shakir Ferreira MD, 10 mL at 03/29/24 0823    sodium chloride 0.9 % flush 10 mL, 10 mL, Intravenous, PRN, Shakir Ferreira MD, 10 mL at 03/27/24 1052    sodium  chloride 0.9 % infusion 40 mL, 40 mL, Intravenous, PRN, Shakir Ferreira MD, 300 mL at 03/27/24 0926      Objective     Vital Signs  Vitals:    03/28/24 2149 03/28/24 2357 03/29/24 0455 03/29/24 0824   BP: 127/55 123/61 122/68 142/72   BP Location: Left arm Left arm Left arm Left arm   Patient Position: Lying Lying Lying Lying   Pulse: 78 87 78 71   Resp: 11 14 13 11   Temp: 98.7 °F (37.1 °C) 98.2 °F (36.8 °C) 98.2 °F (36.8 °C) 97.6 °F (36.4 °C)   TempSrc: Oral Oral Oral Oral   SpO2: 92% 95% 94% 93%   Weight:       Height:           Physical Exam:     General Appearance:    Awake and alert, in no acute distress   Head:    Normocephalic, without obvious abnormality   Eyes:          Conjunctivae normal, anicteric sclera   Throat:   No oral lesions, no thrush, oral mucosa moist   Neck:   No adenopathy, supple, no JVD   Lungs:     respirations regular, even and unlabored   Abdomen:     Soft, non-tender, no rebound or guarding, non-distended   Rectal:     Deferred   Extremities:   No edema, no cyanosis   Skin:   No bruising or rash, no jaundice        Results Review:    CBC    Results from last 7 days   Lab Units 03/29/24  0020 03/28/24  0439 03/27/24  0425   WBC 10*3/mm3 12.74* 14.03* 8.61   HEMOGLOBIN g/dL 14.4 15.7 15.2   PLATELETS 10*3/mm3 239 234 213     CMP   Results from last 7 days   Lab Units 03/29/24  0503 03/28/24  0439 03/27/24  0425   SODIUM mmol/L 142 142 142   POTASSIUM mmol/L 3.8 4.2 3.8   CHLORIDE mmol/L 107 105 106   CO2 mmol/L 24.0 27.0 25.0   BUN mg/dL 23 18 18   CREATININE mg/dL 1.46* 1.44* 1.28*   GLUCOSE mg/dL 78 95 90     Cr Clearance Estimated Creatinine Clearance: 40.4 mL/min (A) (by C-G formula based on SCr of 1.46 mg/dL (H)).  Coag     HbA1C   Lab Results   Component Value Date    HGBA1C 5.60 03/28/2024         Infection     UA      Microbiology Results (last 10 days)       ** No results found for the last 240 hours. **          Imaging Results (Last 72 Hours)       Procedure Component  Value Units Date/Time    MRI Cervical Spine With & Without Contrast [[id number]] Collected: 03/28/24 1718     Updated: 03/28/24 1733    Narrative:      MRI CERVICAL SPINE W WO CONTRAST    Date of Exam: 3/28/2024 4:18 PM EDT    Indication: roule out stroke.     Comparison: None available.    Technique:  Routine multiplanar/multisequence sequence images of the cervical spine were obtained before and after the uneventful administration of Prohance.        Findings:  Some of the sequences are limited by motion. The cervical cord is normal in caliber. There is no evident cord edema or abnormal cord enhancement. There is no abnormal fluid collection within the central canal. There is normal generalized marrow signal   with some degenerative edema in the left articular processes of C6 and C7. There is no paraspinal fluid collection.    C2-C3: No disc protrusion. No central canal or foraminal stenosis    C3-C4: No significant disc protrusion. Bilateral facet arthropathy. Right foraminal stenosis due to uncovertebral hypertrophy    C4-C5: Mild broad-based disc-osteophyte complex which abuts but does not compress the cord. Left facet arthropathy resulting in minimal C4 anterolisthesis. Mild left foraminal stenosis due to facet hypertrophy    C5-C6: Mild broad-based disc-osteophyte complex which abuts but does not compress the cord. Left facet arthropathy. No significant foraminal stenosis    C6-C7: Broad-based disc protrusion which abuts but does not clearly compress the cord. Left facet arthropathy. No significant foraminal stenosis    C7-T1: No disc protrusion. No central canal or foraminal stenosis      Impression:      Impression:    1. No findings to suggest cervical cord infarct  2. Cervical degenerative disease        Electronically Signed: Frederic Stark    3/28/2024 5:31 PM EDT    Workstation ID: [id number]    MRI Brain Without Contrast [[id number]] Collected: 03/28/24 1649     Updated: 03/28/24 1703    Narrative:       MRI BRAIN WO CONTRAST    Date of Exam: 3/28/2024 4:18 PM EDT    Indication: Stroke suspected (Ped 0-17y).     Comparison: None available.    Technique:  Routine multiplanar/multisequence sequence images of the brain were obtained without contrast administration.      Findings:    Motion artifact limits diagnostic sensitivity.  Restricted diffusion is visualized in the left lateral medulla measuring 0.8 x 0.9 cm. No evidence of mass or mass effect. There is associated subtle T2 hyperintensity.  Age-related illusional changes are visualized. Bilateral supratentorial periventricular white matter T2/FLAIR hyperintensities are visualized with associated small foci of gliosis. There is an old infarct adjacent to the frontal horn of the left lateral   ventricle measuring 7 mm with surrounding gliosis.  There is no evidence of acute or chronic intracranial hemorrhage.   No mass effect or midline shift.] [No abnormal extra-axial collections.  The major vascular flow voids appear intact.   The basal ganglia and cerebellum appear within normal limits.   Calvarial and superficial soft tissue signal is within normal limits.   Orbits appear unremarkable.   The paranasal sinuses and the mastoid air cells appear well aerated.   Midline structures are intact.         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    Age-related illusional changes. Moderate changes of chronic microvessel ischemia.    Findings relayed to JUAN DAVID Friedman on March 28, 2024 at 5:01 p.m. by telephone.        Electronically Signed: Aiden Saleem MD    3/28/2024 5:01 PM EDT    Workstation ID: [id number]    XR Orbits 4+ View [[id number]] Collected: 03/28/24 1508     Updated: 03/28/24 1511    Narrative:      XR ORBITS 4+ VW    Date of Exam: 3/28/2024 3:04 PM EDT    Indication: rule out metal inthe eyes    Comparison: None available.    Findings: No retained radiopaque foreign body is seen within the  orbital or immediate periorbital soft tissues. Presumed hearing aid on the right, external to the scalp. Rounded calcific density projects over the left frontal scalp and may represent a   small bone island or osteoma.      Impression:      Impression:  No unexpected retained radiopaque foreign body is seen within the orbital or periorbital soft tissues.      Electronically Signed: Venice Pinto MD    3/28/2024 3:09 PM EDT    Workstation ID: [id number]    FL Video Swallow With Speech Single Contrast [[id number]] Resulted: 03/28/24 1108     Updated: 03/28/24 1108    Narrative:      This procedure was auto-finalized with no dictation required.    CT Chest Without Contrast Diagnostic [[id number]] Collected: 03/27/24 1325     Updated: 03/27/24 1338    Narrative:      CT CHEST WO CONTRAST DIAGNOSTIC    Date of Exam: 3/27/2024 1:07 PM EDT    Indication: Dysphagia, possible aspiration.    Comparison: Chest radiograph dated 7/25/2016    Technique: Axial CT images were obtained of the chest without contrast administration.  Sagittal and coronal reconstructions were performed.  Automated exposure control and iterative reconstruction methods were used.    Findings:  For full findings of the soft tissues of the neck, please see separately dictated CT neck report. The visualized soft tissue structures at the base of the neck including the thyroid appear within normal limits. There is no lower cervical or axillary   adenopathy.    The heart size is normal. There is no pericardial effusion. The aorta is normal in caliber without evidence of aneurysm formation. There is coronary and aortic atherosclerotic calcification. The main pulmonary artery is normal in caliber. There are small   mediastinal and hilar lymph nodes not meeting size criteria for pathologic enlargement. There are calcified right hilar lymph nodes likely related to chronic granulomatous disease.    The tracheobronchial tree is patent. There is minimal amount of  secretions within the dependent portion of the trachea. There is no abnormal bronchial wall thickening or bronchiectasis. There is no acute consolidation or pleural effusion. There is no   evidence of pneumothorax. There is minimal bandlike atelectasis within the bilateral lower lobes. There is a small 4 mm pulmonary nodule within the posterior aspect of the left apex best seen on image 23 of series 3.    The esophagus is normal in course and caliber with a small sliding-type hiatal hernia. Visualized portions of the upper abdomen demonstrate no acute findings. There are partially visualized exophytic cysts arising from the left kidney. There are   multilevel degenerative changes of the cervical and thoracic spine.      Impression:      Impression:  1. Minimal secretions within the posterior aspect of the trachea. No evidence of bronchiolitis or evidence of aspiration pneumonia.  2. Small 4 mm pulmonary nodule within the posterior aspect of the left upper lobe. Recommend follow-up chest CT in 1 year if patient has risk factors such as smoking history.      Electronically Signed: Yohan Fredrick    3/27/2024 1:36 PM EDT    Workstation ID: [id number]    CT Soft Tissue Neck Without Contrast [[id number]] Collected: 03/27/24 1323     Updated: 03/27/24 1328    Narrative:      CT SOFT TISSUE NECK WO CONTRAST    Date of Exam: 3/27/2024 1:07 PM EDT    Indication: dysphagia, s/p EGD with dilation and intubation.    Comparison: Chest CT 3/27/2024    Technique: Axial CT images were obtained of the neck without contrast administration.  Sagittal and coronal reconstructions were performed.  Automated exposure control and iterative reconstruction methods were used.      Findings:  Orbital structures are unremarkable. The skull base is normal. The paranasal sinuses appear clear. The nasopharynx, oropharynx, and hypopharynx appear normal. The airway appears normal. The esophagus appears unremarkable. There is no evidence of    hemorrhage. There are atherosclerotic calcifications in the aorta. There is no lymphadenopathy. There is a left upper lobe pulmonary nodule adjacent to the major fissure measuring 4 mm in diameter, unchanged from the previous CT.        Impression:      Impression:    1. No acute findings in the neck.  2. 4 mm left upper lobe pulmonary nodule    Electronically Signed: Ant Aguilar MD    3/27/2024 1:26 PM EDT    Workstation ID: [id number]            Assessment & Plan     ASSESSMENT:  -Food impaction s/p EGD showing spontaneous passage  -Dysphagia  -Abnormal MRI brain  -B12 deficiency  -Hypertension  -Hyperlipidemia  -History of esophageal stricture     PLAN:  Patient is an 88-year-old male with history of hypertension and hyperlipidemia who presented on 3/26 with complaints of food bolus.  Patient underwent EGD yesterday which showed that food impaction had spontaneously passed.  The patient was dilated to 52 Urdu.  Following the procedure, the patient reported that he was unable to swallow.  Bedside RN reported coughing and stridor with attempts at drinking water. CT chest did not show any evidence of aspiration pneumonia. CT soft tissue neck showed no acute abnormality.      Patient with no new complaints.  Continues to deny any chest pain or abdominal pain.  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  Neurology following.  We will plan EGD/PEG tube placement today.  Maintain NPO.  On Unasyn.  Check stat INR.  Replace B12.  Continue supportive care.    Electronically signed by LIGIA Milan, 03/29/24, 10:15 AM EDT.

## 2024-03-29 NOTE — ANESTHESIA PREPROCEDURE EVALUATION
Anesthesia Evaluation     Patient summary reviewed and Nursing notes reviewed   no history of anesthetic complications:   NPO Solid Status: > 8 hours  NPO Liquid Status: > 2 hours           Airway   Dental      Pulmonary    (+) a smoker Former,  Cardiovascular     ECG reviewed    (+) hypertension, hyperlipidemia      Neuro/Psych  GI/Hepatic/Renal/Endo    (+) renal disease- CRI    Musculoskeletal     (+) neck pain  Abdominal    Substance History      OB/GYN          Other      history of cancer    ROS/Med Hx Other: Additional History:  Prostate cancer, dysphagia    PSH:  CYST REMOVAL COLONOSCOPY  ENDOSCOPY               Anesthesia Plan    ASA 3     general     (Patient identified; pre-operative vital signs, all relevant labs/studies, complete medical/surgical/anesthetic history, full medication list, full allergy list, and NPO status obtained/reviewed; physical assessment performed; anesthetic options, side effects, potential complications, risks, and benefits discussed; questions answered; written anesthesia consent obtained; patient cleared for procedure; anesthesia machine and equipment checked and functioning)  intravenous induction     Anesthetic plan, risks, benefits, and alternatives have been provided, discussed and informed consent has been obtained with: patient.    Plan discussed with CRNA and CAA.    CODE STATUS:    Level Of Support Discussed With: Patient  Code Status (Patient has no pulse and is not breathing): CPR (Attempt to Resuscitate)  Medical Interventions (Patient has pulse or is breathing): Full Support

## 2024-03-29 NOTE — DISCHARGE PLACEMENT REQUEST
"Valeria Guallpa (88 y.o. Male)       Date of Birth   1935    Social Security Number       Address   [address] [place] [address]    Home Phone   [phone]    MRN   [id number]       Confucianism   Patient Refused    Marital Status                               Admission Date   3/26/24    Admission Type   Emergency    Admitting Provider   Tai Avendano MD    Attending Provider   Michell Menchaca MD    Department, Room/Bed   [facility], 240/1       Discharge Date       Discharge Disposition       Discharge Destination                                 Attending Provider: Michell Menchaca MD    Allergies: No Known Allergies    Isolation: None   Infection: None   Code Status: CPR    Ht: 172.7 cm (68\")   Wt: 81.2 kg (179 lb)    Admission Cmt: None   Principal Problem: Food impaction of esophagus [T18.128A,W44.F3XA]                   Active Insurance as of 3/26/2024       Primary Coverage       Payor Plan Insurance Group Employer/Plan Group    MEDICARE MEDICARE A & B        Payor Plan Address Payor Plan Phone Number Payor Plan Fax Number Effective Dates    [address] [phone]  8/1/2000 - None Entered    [facility] [address]         Subscriber Name Subscriber Birth Date Member ID       VALERIA GUALLPA 1935 [id number]               Secondary Coverage       Payor Plan Insurance Group Employer/Plan Group    ANTHEM BLUE CROSS ANTHEM BLUE CROSS BLUE SHIELD PPO [id number]       Payor Plan Address Payor Plan Phone Number Payor Plan Fax Number Effective Dates    [address] [phone]  1/1/2012 - None Entered    [facility] [id number]         Subscriber Name Subscriber Birth Date Member ID       VALERIA GUALLPA 1935 [id number]                     Emergency Contacts        (Rel.) Home Phone Work Phone Mobile Phone    MABLE ([place]),GOLDEN (Relative) [phone] -- --                "

## 2024-03-29 NOTE — PROGRESS NOTES
Events noted    Patient improving    MRI brain shows lateral medullary infarct which may explain his dysphagia    Left ptosis improving I reviewed his MRI and he has significant chronic small vessel disease    .  He has been having silent strokes.  I showed the images to his daughter and him and other family members.  He is 88-year-old with hypertension and hyperlipidemia at least and those are at least his risk factors    Is going to go for PEG placement and afterwards we will resume aspirin and I am going to check his CTA head and neck    Modification of stroke risk factors:   - Blood pressure should be less than 130/80 outpatient, HbA1c less than 6.5, LDL less than 70; b12>500 and smoking cessation if applicable. We would be grateful if the primary team / primary care physician keep a close watch on this above targets.  - Stroke education  - Follow up with neurologist of choice

## 2024-03-29 NOTE — PROGRESS NOTES
"    [facility] MEDICINE SERVICE  DAILY PROGRESS NOTE    NAME: Hever Guallpa  : 1935  MRN: [id number]      LOS: 2 days     PROVIDER OF SERVICE: Michell Menchaca MD    Chief Complaint: Food impaction of esophagus    Subjective:     Interval History:  History taken from: patient    History of Present Illness:   Per the documentation by Shakir Ferreira MD , dated 2024,  \"This is an 88-year-old male who presents with acute food impaction. He states he was eating fried potatoes yesterday at 5 PM and that the food got stuck. He is unable to swallow secretions. He does feel a sense of relief this morning and wonders if the food bolus is passed but he is not sure. He reports a recent history of dysphagia and odynophagia. He has a history of reflux symptoms in the past but none recently. No unintentional weight loss. No blood thinners. Last EGD  hiatal hernia GE junction ring and UES stricture status post dilation 54 [place]. \"     3/28/24 seen in bed NAD, says he didn't sleep well last night, awaiting speech eval. DW RN    Review of Systems  was done and negative except as in the note   3/29  Patient status post EGD for food impaction  Patient still n.p.o.  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  If patient fails swallow evaluation, would recommend Dobbhoff tube placement with initiation of enteral feeds.  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  Will consult neurology.    Objective:     Vital Signs  Temp:  [97.6 °F (36.4 °C)-98.7 °F (37.1 °C)] 97.6 °F (36.4 °C)  Heart Rate:  [71-96] 71  Resp:  [11-18] 11  BP: " (122-145)/(50-72) 142/72   Body mass index is 27.35 kg/m².      Physical Exam  CVS: S1/S2 present, RRR, no M/R/G  Lungs: Clear bilaterally.  No additional sounds  Abdomen: Soft nontender positive bowel sounds  Ext: No lower extremity edema  Skin: No rash or lesions  Neuro: no focal deficits  Psych: Mood is normal  Scheduled Meds   ampicillin-sulbactam, 3 g, Intravenous, Q6H  guaiFENesin-codeine, 10 mL, Oral, TID  sodium chloride, 10 mL, Intravenous, Q12H       PRN Meds     acetaminophen    senna-docusate sodium **AND** polyethylene glycol **AND** bisacodyl **AND** bisacodyl    melatonin    ondansetron ODT **OR** ondansetron    [COMPLETED] Insert Peripheral IV **AND** sodium chloride    sodium chloride    sodium chloride   Infusions  lactated ringers, 75 mL/hr, Last Rate: 75 mL/hr (03/29/24 0823)          Diagnostic Data    Results from last 7 days   Lab Units 03/29/24  0503 03/29/24  0020   WBC 10*3/mm3  --  12.74*   HEMOGLOBIN g/dL  --  14.4   HEMATOCRIT %  --  44.4   PLATELETS 10*3/mm3  --  239   GLUCOSE mg/dL 78  --    CREATININE mg/dL 1.46*  --    BUN mg/dL 23  --    SODIUM mmol/L 142  --    POTASSIUM mmol/L 3.8  --    ANION GAP mmol/L 11.0  --        MRI Cervical Spine With & Without Contrast    Result Date: 3/28/2024  Impression: 1. No findings to suggest cervical cord infarct 2. 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 Age-related illusional changes. Moderate changes of chronic microvessel ischemia. Findings relayed to JUAN DAVID Friedman on March 28, 2024 at 5:01 p.m. by telephone. Electronically Signed: Aiden Saleem MD  3/28/2024 5:01 PM EDT  Workstation ID: [id number]    XR Orbits 4+ View    Result Date: 3/28/2024  Impression: No unexpected retained radiopaque foreign body is seen within the  orbital or periorbital soft tissues. Electronically Signed: Venice Pinto MD  3/28/2024 3:09 PM EDT  Workstation ID: [id number]    CT Chest Without Contrast Diagnostic    Result Date: 3/27/2024  Impression: 1. Minimal secretions within the posterior aspect of the trachea. No evidence of bronchiolitis or evidence of aspiration pneumonia. 2. Small 4 mm pulmonary nodule within the posterior aspect of the left upper lobe. Recommend follow-up chest CT in 1 year if patient has risk factors such as smoking history. Electronically Signed: Yohan Alcala  3/27/2024 1:36 PM EDT  Workstation ID: [id number]    CT Soft Tissue Neck Without Contrast    Result Date: 3/27/2024  Impression: 1. No acute findings in the neck. 2. 4 mm left upper lobe pulmonary nodule Electronically Signed: Ant Aguilar MD  3/27/2024 1:26 PM EDT  Workstation ID: [id number]       I reviewed the patient's new clinical results.    Assessment/Plan:     Active and Resolved Problems  Active Hospital Problems    Diagnosis  POA    **Food impaction of esophagus [T18.128A, W44.F3XA]  Yes    Dysphagia [R13.10]  Yes      Resolved Hospital Problems   No resolved problems to display.     Food impaction of the esophagus  The patient was evaluated by EGD by GI on 3/27/2023  Impression:  Food impaction status post spontaneous passage  History of dysphagia and odynophagia status post 52 [place]endejas dilation  Gastric erosion  The patient was later reporting coughing and stridor with sips of water status post EGD.  The patient is having difficulty with swallowing in the neck area.  CT of the neck and chest were ordered with no acute findings in the neck and 4 mm left upper lobe pulmonary nodule  1. Minimal secretions within the posterior aspect of the trachea. No evidence of bronchiolitis or evidence of aspiration pneumonia.  2. Small 4 mm pulmonary nodule within the posterior aspect of the left upper lobe. Recommend follow-up chest CT in 1 year if patient has risk factors  such as smoking history.     The patient was evaluated by speech therapy and recommended n.p.o. at this time and they will continue to follow and reevaluate the patient swallow   speech to evaluate      Major depression  The patient was evaluated by psychiatry and started on sertraline 25 p.o. daily    DVT prophylaxis:  Mechanical DVT prophylaxis orders are present.         Code status is   Code Status and Medical Interventions:   Ordered at: 03/26/24 7322     Level Of Support Discussed With:    Patient     Code Status (Patient has no pulse and is not breathing):    CPR (Attempt to Resuscitate)     Medical Interventions (Patient has pulse or is breathing):    Full Support       Plan for disposition:home in 1 days    Time: 30 minutes    Signature: Electronically signed by Michell Menchaca MD, 03/29/24, 09:57 EDT.  [facility] Hospitalist Team

## 2024-03-29 NOTE — CASE MANAGEMENT/SOCIAL WORK
Continued Stay Note   Buzz     Patient Name: Hever Guallpa  MRN: [id number]  Today's Date: 3/29/2024    Admit Date: 3/26/2024    Plan: D/C Plan: [facility].  Transport by W/C van vs family car.   Discharge Plan       Row Name 03/29/24 1537       Plan    Plan D/C Plan: [facility].  Transport by W/C van vs family car.      Row Name 03/29/24 1412       Plan    Plan D/C Plan: [facility] pending acceptance; No pre-cert or PASRR needed. Transport TBD.    Plan Comments Barrier to D/C: G-tube placement this afternoon 3/29/24                                           Discharge Codes    No documentation.                 Expected Discharge Date and Time       Expected Discharge Date Expected Discharge Time    Mar 31, 2024               Terrie Trotter RN

## 2024-03-29 NOTE — THERAPY TREATMENT NOTE
Acute Care - Speech Language Pathology Treatment Note    [facility]     Patient Name: Hever Guallpa  : 1935  MRN: [id number]    Today's Date: 3/29/2024                   Admit Date: 3/26/2024       Visit Dx:      ICD-10-CM ICD-9-CM   1. Food impaction of esophagus, initial encounter  T18.128A 935.1    W44.F3XA    2. Dysphagia, unspecified type  R13.10 787.20       Patient Active Problem List   Diagnosis    Neck pain    Body mass index (BMI) of 28.0-28.9 in adult    Altered bowel function    Family history of malignant neoplasm of colon    History of malignant neoplasm of prostate    Hx of radiation therapy    Hyperlipidemia    Hypertension    Melanocytic nevus    Osteopenia    Sleep disorder    Thrombocytopenia    Vitamin D deficiency    Food impaction of esophagus    Dysphagia       Past Medical History:   Diagnosis Date    Hyperlipidemia     Hypertension     Neck pain     Osteopenia     Sleep disorder     Thrombocytopenia     Vitamin D deficiency        Past Surgical History:   Procedure Laterality Date    COLONOSCOPY      CYST REMOVAL      ENDOSCOPY N/A 3/27/2024    Procedure: ESOPHAGOGASTRODUODENOSCOPY with non-guided esophageal dilation using 52 Fr. Bougie's;  Surgeon: Shakir Ferreira MD;  Location: [facility] ENDOSCOPY;  Service: Gastroenterology;  Laterality: N/A;  gastric erosions       Skilled ST intervention conducted this date targeting dysphagia in the setting of acute brainstem stroke and new onset of dysphagia.  Pt alert, oriented, pleasant, and amenable to treatment.   Pt on room air during session. Pt currently prescribed a Other/NPO with no source of nutrition currently.      Narrative & Impression   MRI BRAIN WO CONTRAST     Date of Exam: 3/28/2024 4:18 PM EDT     Indication: Stroke suspected (Ped 0-17y).     Comparison: None available.     Technique:  Routine multiplanar/multisequence sequence images of the brain were obtained without contrast administration.        Findings:     Motion  "artifact limits diagnostic sensitivity.  Restricted diffusion is visualized in the left lateral medulla measuring 0.8 x 0.9 cm. No evidence of mass or mass effect. There is associated subtle T2 hyperintensity.  Age-related illusional changes are visualized. Bilateral supratentorial periventricular white matter T2/FLAIR hyperintensities are visualized with associated small foci of gliosis. There is an old infarct adjacent to the frontal horn of the left lateral   ventricle measuring 7 mm with surrounding gliosis.  There is no evidence of acute or chronic intracranial hemorrhage.   No mass effect or midline shift.] [No abnormal extra-axial collections.  The major vascular flow voids appear intact.   The basal ganglia and cerebellum appear within normal limits.   Calvarial and superficial soft tissue signal is within normal limits.   Orbits appear unremarkable.   The paranasal sinuses and the mastoid air cells appear well aerated.   Midline structures are intact.         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     Age-related illusional changes. Moderate changes of chronic microvessel ischemia.       Treatment narrative/results: The patient was seen bedside today for skilled dysphagia therapy. Upon entrance to his room he was sleeping, however did arouse and participate in therapy. Reviewed results of recent VFSS completed yesterday. The patient stated he felt he was \"swallowing better\". He was given ice chip trials today in therapy with cues to use an effortful swallow, throat clear/dry swallow and multiple swallows. He initially appeared to swallow the ice chip, however following he exhibited much coughing, throat clearing, \"wet\" vocal quality and multiple swallows. He was unable to clear this or his own secretions. Discussed need for continued swallow therapy with rationale provided. ST attended Neuro Multi-Disciplinary Rounds and case was " discussed. Returned to patient's room to discuss need for alternative means of nutrition due to severity of dysphagia. Discussed rationale for this, need for aggressive swallow therapy/rehab and then repeat instrumental assessment when clinically indicated prior to considering return to a po diet. He verbalized understanding and was in agreement with plan. Information relayed to nursing staff    SLP Recommendation and Plan:  It is recommended that the patient remain NPO with the exception of ice chips/water sips ONLY po as per the Nicole Water Protocol (see below)  Recommend alternative means of nutrition at this time  Recommend ongoing dysphagia therapy/rehab with repeat instrumental assessment of swallow, when clinically indicated and prior to considering return to a po diet.     The rationale to recommend water/ice chips when a PO diet cannot appropriately/functionally sustain nutrition is because water is low risk for aspiration Pneumonia when compared to aspiration of food or other liquids.      Benefits of a water protocol include but are not limited to:     Oral gratification   Engagement of oropharyngeal swallow musculature   Decrease likelihood of dehydration      Guidelines for proper implementation include:  Thorough oral care prior to consuming water  Upright at 90 degree hip flexion  Small sips at slow rate    Monitor for any changes in respiratory status and discontinue if distress noted    The Nicole Free Water Protocol is a research based protocol established in 1984.         EDUCATION    The patient has been educated in the following areas:       Dysphagia (Swallowing Impairment) Oral Care/Hydration NPO rationale.             SLP GOALS       Row Name 03/29/24 1400       (LTG) Swallow    (LTG) Swallow The patient will maximize swallow function for least restrictive PO diet, exhibiting no complications associated with dysphagia, adequate PO intake, and demonstrating independent use of safe swallow  strategies.  -SM    Parker (Swallow Long Term Goal) with maximum cues (25-49% accuracy)  -SM    Time Frame (Swallow Long Term Goal) by discharge  -SM    Progress/Outcomes (Swallow Long Term Goal) goal ongoing  -SM    Comment (Swallow Long Term Goal) See above  -SM       (STG) Swallow 1    (STG) Swallow 1 Patient will participate in ongoing assessment of swallow, including reevaluation clinically and/or including instrumental assessment of swallow if indicated, to further assess swallow function , intiate PO diet, and establish least restrictive diet  -SM    Parker (Swallow Short Term Goal 1) with maximum cues (25-49% accuracy)  -SM    Time Frame (Swallow Short Term Goal 1) 1 week  -SM    Progress/Outcomes (Swallow Short Term Goal 1) goal ongoing  -SM    Comment (Swallow Short Term Goal 1) See above. Ongoing dysphagia therapy continues  -SM       (STG) Swallow 2    (STG) Swallow 2 Patient will participate in pharyngeal strengthening exercises to improve pharyngeal strength and mobility for improved swallowing.  -SM    Parker (Swallow Short Term Goal 2) with maximum cues (25-49% accuracy)  -SM    Time Frame (Swallow Short Term Goal 2) by discharge  -SM    Progress/Outcomes (Swallow Short Term Goal 2) goal ongoing  -SM    Comment (Swallow Short Term Goal 2) See above  -SM       (STG) Pharyngeal Strengthening Exercise Goal 1 (SLP)    Activity (Pharyngeal Strengthening Goal 1, SLP) increase anterior movement of the hyolaryngeal complex;increase epiglottic inversion and retroflexion;increase squeeze/positive pressure generation;increase tongue base retraction;increase PES opening  -SM    Increase Superior Movement of the Hyolaryngeal Complex shaker;chin tuck against resistance (CTAR);effortful pitch glide (falsetto + pharyngeal squeeze);Mendelsohn;falsetto  -SM    Increase Anterior Movement of the Hyolaryngeal Complex shaker;chin tuck against resistance (CTAR);falsetto;hard effortful swallow  -SM     Increase Epiglottic Inversion and Retroflexion hard effortful swallow  -SM    Increase Squeeze/Positive Pressure Generation chin tuck against resistance (CTAR);shaker  -SM    Increase Tongue Base Retraction eloise  -SM    Bendena/Accuracy (Pharyngeal Strengthening Goal 1, SLP) with maximum cues (25-49% accuracy)  -SM    Time Frame (Pharyngeal Strengthening Goal 1, SLP) short term goal (STG)  -SM    Progress/Outcomes (Pharyngeal Strengthening Goal 1, SLP) goal ongoing  -SM    Comment (Pharyngeal Strengthening Goal 1, SLP) See above  -SM      Row Name 03/27/24 1500             (LTG) Swallow    (LTG) Swallow The patient will maximize swallow function for least restrictive PO diet, exhibiting no complications associated with dysphagia, adequate PO intake, and demonstrating independent use of safe swallow strategies.  -RM      Progress/Outcomes (Swallow Long Term Goal) new goal  -RM         (STG) Swallow 1    (STG) Swallow 1 Patient will participate in ongoing assessment of swallow, including reevaluation clinically and/or including instrumental assessment of swallow if indicated, to further assess swallow function , intiate PO diet, and establish least restrictive diet  -RM      Progress/Outcomes (Swallow Short Term Goal 1) new goal  -RM                User Key  (r) = Recorded By, (t) = Taken By, (c) = Cosigned By      Initials Name Provider Type    Bonita Rooney, SLP Speech and Language Pathologist    Bernarda Sanchez, SLP Speech and Language Pathologist    Jonathan David, SLP Speech and Language Pathologist                                Time Calculation:                                  SHARA Buckner  3/29/2024

## 2024-03-29 NOTE — THERAPY TREATMENT NOTE
Subjective: Pt agreeable to therapeutic plan of care.     Objective:     Bed mobility - SBA   Transfers - Sagar for sit to stand from bed, bed to chair ambulatory transfer without AD  Ambulation - 7', 50' Min-A without AD. Gait completed without AD to challenge dynamic standing balance while therapist is present to assist. Pt with impaired coordination LLE and with posterioleft lean with trunk, pt is aware and attempts to correct.  RW left in room for nursing staff to use for increased safety.   Static and dynamic sitting balance with SBA seated EOB with min/mod challenge.   Static and dynamic standing balance without AD. CGA for static stance. Sagar for dynamic balance - min/mod challenge with lateral wt shifting, brief SLS trial, and 180 degree turn.   Self care - pt assisted with balance during urinal use and encouraged/assisted to change out of soiled underwear into brief. Pt with urinary incontinence in standing.   Edu and cues for call light use for transfer back to bed and  after urinary incontinence.     Vitals: WNL    Pain: 0 VAS       Education: Provided education on the importance of mobility in the acute care setting, Verbal/Tactile Cues, Transfer Training, and Gait Training, AIR recommendation and info regarding that level of care.     Assessment: Hever Guallpa is progressing quickly toward all PT goals. He demos improved balance and gait today compared to yesterday. Pt  presents with balance, gait,  functional mobility impairments which indicate the need for skilled intervention. Pt is highly motivated and work til fatigue. He is an ideal candidate for AIR. Tolerating session today without incident. Will continue to follow and progress as tolerated.     Plan/Recommendations:   If medically appropriate, High Intensity Therapy recommended post-acute care. This is recommended as therapy feels the patient would require 5-6 days per week, 2-3 hours per day. At this time, inpatient rehabilitation (acute  rehab) would be the first choice and SNF would be second. Pt requires no DME at discharge.     Pt desires Inpatient Rehabilitation placement at discharge. Pt cooperative; agreeable to therapeutic recommendations and plan of care.         Basic Mobility 6-click:  Rollin = Total, A lot = 2, A little = 3; 4 = None  Supine>Sit:   1 = Total, A lot = 2, A little = 3; 4 = None   Sit>Stand with arms:  1 = Total, A lot = 2, A little = 3; 4 = None  Bed>Chair:   1 = Total, A lot = 2, A little = 3; 4 = None  Ambulate in room:  1 = Total, A lot = 2, A little = 3; 4 = None  3-5 Steps with railin = Total, A lot = 2, A little = 3; 4 = None  Score: 18    Modified [place]: 4 = Moderately severe disability (Unable to attend to own bodily needs without assistance, and unable to walk unassisted)     Post-Tx Position: Up in Chair, Alarms activated, and Call light and personal items within reach  PPE: gloves

## 2024-03-29 NOTE — THERAPY EVALUATION
Patient Name: Hever Guallpa  : 1935    MRN: [id number]                              Today's Date: 3/29/2024       Admit Date: 3/26/2024    Visit Dx:     ICD-10-CM ICD-9-CM   1. Food impaction of esophagus, initial encounter  T18.128A 935.1    W44.F3XA    2. Dysphagia, unspecified type  R13.10 787.20     Patient Active Problem List   Diagnosis    Neck pain    Body mass index (BMI) of 28.0-28.9 in adult    Altered bowel function    Family history of malignant neoplasm of colon    History of malignant neoplasm of prostate    Hx of radiation therapy    Hyperlipidemia    Hypertension    Melanocytic nevus    Osteopenia    Sleep disorder    Thrombocytopenia    Vitamin D deficiency    Food impaction of esophagus    Dysphagia     Past Medical History:   Diagnosis Date    Hyperlipidemia     Hypertension     Neck pain     Osteopenia     Sleep disorder     Thrombocytopenia     Vitamin D deficiency      Past Surgical History:   Procedure Laterality Date    COLONOSCOPY      CYST REMOVAL      ENDOSCOPY N/A 3/27/2024    Procedure: ESOPHAGOGASTRODUODENOSCOPY with non-guided esophageal dilation using [address]'s;  Surgeon: Shakir Ferreira MD;  Location: [facility] ENDOSCOPY;  Service: Gastroenterology;  Laterality: N/A;  gastric erosions      General Information       Row Name 24 1410          OT Time and Intention    Document Type evaluation  -LS     Mode of Treatment occupational therapy  -LS       Row Name 24 1410          General Information    Patient Profile Reviewed yes  -LS     Prior Level of Function independent:;ADL's;driving;all household mobility;community mobility  Cares [place] a 40 [place]  -LS     Existing Precautions/Restrictions fall;NPO  -LS     Barriers to Rehab hearing deficit;medically complex  -LS       Row Name 24 1410          Living Environment    People in Home alone  -LS       Row Name 24 1410          Home Main Entrance    Number of Stairs, Main Entrance five  -LS        Row Name 03/29/24 1410          Stairs Within Home, Primary    Number of Stairs, Within Home, Primary none  -[facility] Name 03/29/24 1410          Cognition    Orientation Status (Cognition) oriented x 4  -[facility] Name 03/29/24 1410          Safety Issues, Functional Mobility    Impairments Affecting Function (Mobility) balance;coordination;motor control;motor planning;pain;postural/trunk control  -               User Key  (r) = Recorded By, (t) = Taken By, (c) = Cosigned By      Initials Name Provider Type     Dameon Verduzco OT Occupational Therapist                     Mobility/ADL's       [facility] Name 03/29/24 1412          Bed Mobility    Bed Mobility bed mobility (all) activities  -     All Activities, [place] (Bed Mobility) contact guard  -[facility] Name 03/29/24 1412          Transfers    Transfers sit-stand transfer  -LS       Row Name 03/29/24 1412          Sit-Stand Transfer    Sit-Stand [place] (Transfers) minimum assist (75% patient effort)  -     Assistive Device (Sit-Stand Transfers) walker, front-wheeled  -LS       Row Name 03/29/24 1412          Functional Mobility    Functional Mobility- Ind. Level minimum assist (75% patient effort)  -     Functional Mobility- Comment No AD used - falls backward to the left at times but has very good awareness of deficits  -     Patient was able to Ambulate yes  -[facility] Name 03/29/24 1412          Activities of Daily Living    BADL Assessment/Intervention lower body dressing;toileting  -LS       Row Name 03/29/24 1412          Lower Body Dressing Assessment/Training    [place] Level (Lower Body Dressing) lower body dressing skills;minimum assist (75% patient effort);pants/bottoms;socks  -     Position (Lower Body Dressing) edge of bed sitting;supported standing  -[facility] Name 03/29/24 1412          Toileting Assessment/Training    [place] Level (Toileting) minimum assist (75% patient effort)  -     Comment,  (Toileting) Incontinent of urine  -               User Key  (r) = Recorded By, (t) = Taken By, (c) = Cosigned By      Initials Name Provider Type    LS Dameon Verduzco OT Occupational Therapist                   Obj/Interventions       Row Name 03/29/24 1419          Sensory Assessment (Somatosensory)    Sensory Assessment (Somatosensory) sensation intact  -       Row Name 03/29/24 1419          Vision Assessment/Intervention    Vision Assessment Comment Tested convergence but with left facial droop difficult to assess accurately. Pt reports no aute vision changes  -       Row Name 03/29/24 1419          Range of Motion Comprehensive    General Range of Motion bilateral upper extremity ROM WFL  -       Row Name 03/29/24 1419          Strength Comprehensive (MMT)    Comment, General Manual Muscle Testing (MMT) Assessment BUEs grossly 4-/5  -       Row Name 03/29/24 1419          Motor Skills    Motor Skills neuro-muscular function  -     Neuromuscular Function ataxia;left;lower extremity  -       Row Name 03/29/24 1419          Balance    Balance Assessment sitting static balance;sitting dynamic balance;standing static balance;standing dynamic balance  -LS     Static Sitting Balance standby assist  -LS     Dynamic Sitting Balance standby assist  -LS     Position, Sitting Balance sitting edge of bed;unsupported  -LS     Static Standing Balance contact guard  -LS     Dynamic Standing Balance minimal assist  -LS     Position/Device Used, Standing Balance unsupported  -LS               User Key  (r) = Recorded By, (t) = Taken By, (c) = Cosigned By      Initials Name Provider Type    Dameon Villgaomez OT Occupational Therapist                   Goals/Plan       Row Name 03/29/24 1428          Bed Mobility Goal 1 (OT)    Activity/Assistive Device (Bed Mobility Goal 1, OT) bed mobility activities, all  -LS     [place] Level/Cues Needed (Bed Mobility Goal 1, OT) modified independence  -     Time Frame  (Bed Mobility Goal 1, OT) long term goal (LTG);2 weeks  -LS       Row Name 03/29/24 1428          Transfer Goal 1 (OT)    Activity/Assistive Device (Transfer Goal 1, OT) transfers, all  -LS     [place] Level/Cues Needed (Transfer Goal 1, OT) modified independence  -LS     Time Frame (Transfer Goal 1, OT) long term goal (LTG);2 weeks  -LS       Row Name 03/29/24 1428          Dressing Goal 1 (OT)    Activity/Device (Dressing Goal 1, OT) dressing skills, all  -LS     [place]/Cues Needed (Dressing Goal 1, OT) modified independence  -LS     Time Frame (Dressing Goal 1, OT) long term goal (LTG);2 weeks  -LS       Row Name 03/29/24 1428          Toileting Goal 1 (OT)    Activity/Device (Toileting Goal 1, OT) toileting skills, all  -LS     [place] Level/Cues Needed (Toileting Goal 1, OT) modified independence  -LS     Time Frame (Toileting Goal 1, OT) long term goal (LTG);2 weeks  -       Row Name 03/29/24 1428          Therapy Assessment/Plan (OT)    Planned Therapy Interventions (OT) activity tolerance training;BADL retraining;functional balance retraining;IADL retraining;occupation/activity based interventions;ROM/therapeutic exercise;strengthening exercise;transfer/mobility retraining;patient/caregiver education/training  -               User Key  (r) = Recorded By, (t) = Taken By, (c) = Cosigned By      Initials Name Provider Type    Dameon Villagomez OT Occupational Therapist                   Clinical Impression       Row Name 03/29/24 1423          Pain Assessment    Pretreatment Pain Rating 0/10 - no pain  -LS     Posttreatment Pain Rating 0/10 - no pain  -LS       Row Name 03/29/24 1423          Plan of Care Review    Plan of Care Reviewed With patient  -LS     Outcome Evaluation 89 yo male admitted with feeling of food stuck in esophagus 3/26.  Pt reports he burned potatoes on the stove and then ate some and the food got stuck.  Patient underwent EGD yesterday which showed that food impaction  had spontaneously passed. Following the procedure, the patient reported that he was unable to swallow.  Bedside RN reported coughing and stridor with attempts at drinking water. Pt has failed swallow study with SLP. 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 This date patient alert and oriented x4. He performed bed mobility with SBA. At EOB, noted that he was incontinent of urine, Min A provided to change soiled clothing to clean. Min A needed to come to standing, as well as for ambulation in hallway without AD. Pt loses balance toward left and posterior, but has very good awareness of deficits and corrects well. BUEs still have functional strength and only minor coordination deficits noted in the left UE, slight dysmetria with finger to nose testing. With balance and coodination deficits noted, he requires increased assistance with ADLs and functional mobility. Unsafe to return home alone, will require acute IP rehab at [place]. OT will follow.  -       Row Name 03/29/24 1423          Therapy Assessment/Plan (OT)    Rehab Potential (OT) good, to achieve stated therapy goals  -     Criteria for Skilled Therapeutic Interventions Met (OT) yes;skilled treatment is necessary  -     Therapy Frequency (OT) 5 times/wk  -     Predicted Duration of Therapy Intervention (OT) until OR  -       Row Name 03/29/24 1423          Therapy Plan Review/Discharge Plan (OT)    Anticipated Discharge Disposition (OT) inpatient rehabilitation facility  -       Row Name 03/29/24 1423          Vital Signs    O2 Delivery Pre Treatment room air  -LS     O2 Delivery Intra Treatment room air  -LS     O2 Delivery Post Treatment room air  -LS     Pre Patient Position Supine  -LS     Intra Patient Position Standing  -LS     Post Patient Position Sitting  -       Row Name 03/29/24 1423          Positioning and  Restraints    Pre-Treatment Position in bed  -LS     Post Treatment Position chair  -LS     In Chair notified nsg;reclined;encouraged to call for assist;call light within reach;exit alarm on  -LS               User Key  (r) = Recorded By, (t) = Taken By, (c) = Cosigned By      Initials Name Provider Type    Dameon Villagomez OT Occupational Therapist                   Outcome Measures       Row Name 03/29/24 1428          How much help from another is currently needed...    Putting on and taking off regular lower body clothing? 2  -LS     Bathing (including washing, rinsing, and drying) 2  -LS     Toileting (which includes using toilet bed pan or urinal) 2  -LS     Putting on and taking off regular upper body clothing 3  -LS     Taking care of personal grooming (such as brushing teeth) 3  -LS     Eating meals 4  -LS     AM-PAC 6 Clicks Score (OT) 16  -LS       Row Name 03/29/24 0800 03/29/24 0400       How much help from another person do you currently need...    Turning from your back to your side while in flat bed without using bedrails? 4  -JL 4  -DI    Moving from lying on back to sitting on the side of a flat bed without bedrails? 3  -JL 3  -DI    Moving to and from a bed to a chair (including a wheelchair)? 3  -JL 2  -DI    Standing up from a chair using your arms (e.g., wheelchair, bedside chair)? 3  -JL 2  -DI    Climbing 3-5 steps with a railing? 2  -JL 1  -DI    To walk in hospital room? 2  -JL 2  -DI    AM-PAC 6 Clicks Score (PT) 17  -JL 14  -DI    Highest Level of Mobility Goal 5 --> Static standing  -JL 4 --> Transfer to chair/commode  -DI      Row Name 03/29/24 1428          Modified [place] Scale    Modified [place] Scale 4 - Moderately severe disability.  Unable to walk without assistance, and unable to attend to own bodily needs without assistance.  -       Row Name 03/29/24 1428          Functional Assessment    Outcome Measure Options AM-PAC 6 Clicks Daily Activity (OT);Modified [place]  -LS                User Key  (r) = Recorded By, (t) = Taken By, (c) = Cosigned By      Initials Name Provider Type    Gladis Parada, RN Registered Nurse    Dameon Villagomez OT Occupational Therapist    Julia Luna, RN Registered Nurse                    Occupational Therapy Education       Title: PT OT SLP Therapies (Done)       Topic: Occupational Therapy (Done)       Point: ADL training (Done)       Description:   Instruct learner(s) on proper safety adaptation and remediation techniques during self care or transfers.   Instruct in proper use of assistive devices.                  Learning Progress Summary             Patient Acceptance, E,TB, VU by  at 3/29/2024 1428                                         User Key       Initials Effective Dates Name Provider Type Discipline     09/22/22 -  Dameon Verduzco OT Occupational Therapist OT                  OT Recommendation and Plan  Planned Therapy Interventions (OT): activity tolerance training, BADL retraining, functional balance retraining, IADL retraining, occupation/activity based interventions, ROM/therapeutic exercise, strengthening exercise, transfer/mobility retraining, patient/caregiver education/training  Therapy Frequency (OT): 5 times/wk  Plan of Care Review  Plan of Care Reviewed With: patient  Outcome Evaluation: 87 yo male admitted with feeling of food stuck in esophagus 3/26.  Pt reports he burned potatoes on the stove and then ate some and the food got stuck.  Patient underwent EGD yesterday which showed that food impaction had spontaneously passed. Following the procedure, the patient reported that he was unable to swallow.  Bedside RN reported coughing and stridor with attempts at drinking water. Pt has failed swallow study with SLP. 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 This date patient alert and  oriented x4. He performed bed mobility with SBA. At EOB, noted that he was incontinent of urine, Min A provided to change soiled clothing to clean. Min A needed to come to standing, as well as for ambulation in hallway without AD. Pt loses balance toward left and posterior, but has very good awareness of deficits and corrects well. BUEs still have functional strength and only minor coordination deficits noted in the left UE, slight dysmetria with finger to nose testing. With balance and coodination deficits noted, he requires increased assistance with ADLs and functional mobility. Unsafe to return home alone, will require acute IP rehab at [place]. OT will follow.     Time Calculation:         Time Calculation- OT       Row Name 03/29/24 1429             Time Calculation- OT    OT Start Time 0958  -      OT Stop Time 1038  -      OT Time Calculation (min) 40 min  -LS      Total Timed Code Minutes- OT 12 minute(s)  -LS      OT Received On 03/29/24  -      OT - Next Appointment 04/01/24  -      OT Goal Re-Cert Due Date 04/12/24  -         Timed Charges    20631 - OT Self Care/Mgmt Minutes 12  -LS         Untimed Charges    OT Eval/Re-eval Minutes 28  -LS         Total Minutes    Timed Charges Total Minutes 12  -LS      Untimed Charges Total Minutes 28  -LS       Total Minutes 40  -LS                User Key  (r) = Recorded By, (t) = Taken By, (c) = Cosigned By      Initials Name Provider Type     Dameon Verduzco OT Occupational Therapist                  Therapy Charges for Today       Code Description Service Date Service Provider Modifiers Qty    [id number]  OT SELF CARE/MGMT/TRAIN EA 15 MIN 3/29/2024 Dameon Verduzco OT GO 1    [id number]  OT EVAL MOD COMPLEXITY 4 3/29/2024 Dameon Verduzco OT GO 1                 Dameon Verduzco OT  3/29/2024

## 2024-03-29 NOTE — CASE MANAGEMENT/SOCIAL WORK
Continued Stay Note  [facility]     Patient Name: Hever Guallpa  MRN: [id number]  Today's Date: 3/29/2024    Admit Date: 3/26/2024    Plan: D/C Plan: [facility] vs [facility]. Family to call CM with choice when decided; No PASRR or Precert required.  Transport by family car vs Van   Discharge Plan       Row Name 03/29/24 1247       Plan    Plan D/C Plan: [facility] vs [facility]. Family to call CM with choice when decided; No PASRR or Precert required.  Transport by family car vs Van    Plan Comments Barrier to D/C: Failed swallow study.  GI consulted for G-tube placement.  Transport TBD               Expected Discharge Date and Time       Expected Discharge Date Expected Discharge Time    Mar 31, 2024               Terrie Trotter RN

## 2024-03-29 NOTE — CASE MANAGEMENT/SOCIAL WORK
Continued Stay Note  [facility]     Patient Name: Hever Guallpa  MRN: [id number]  Today's Date: 3/29/2024    Admit Date: 3/26/2024    Plan: D/C Plan: [facility] pending acceptance; No pre-cert or PASRR needed. Transport TBD.   Discharge Plan       Row Name 03/29/24 1412       Plan    Plan D/C Plan: [facility] pending acceptance; No pre-cert or PASRR needed. Transport TBD.    Plan Comments Barrier to D/C: G-tube placement this afternoon 3/29/24      Row Name 03/29/24 1247       Plan    Plan D/C Plan: [facility] vs [facility]. Family to call CM with choice when decided; No PASRR or Precert required.  Transport by family car vs Van    Plan Comments Barrier to D/C: Failed swallow study.  GI consulted for G-tube placement.  Transport TBD                   Discharge Codes    No documentation.                 Expected Discharge Date and Time       Expected Discharge Date Expected Discharge Time    Mar 31, 2024               Terrie Trotter RN

## 2024-03-29 NOTE — ANESTHESIA POSTPROCEDURE EVALUATION
Patient: Hever Guallpa    Procedure Summary       Date: 03/29/24 Room / Location: [facility] ENDOSCOPY 1 / [facility] ENDOSCOPY    Anesthesia Start: 1714 Anesthesia Stop: 1752    Procedure: ESOPHAGOGASTRODUODENOSCOPY WITH PERCUTANEOUS ENDOSCOPIC GASTROSTOMY TUBE INSERTION Diagnosis:       Dysphagia, unspecified type      (Dysphagia, unspecified type [R13.10])    Surgeons: Shakir Ferreira MD Provider: Darian Remy MD    Anesthesia Type: general ASA Status: 3            Anesthesia Type: general    Vitals  Vitals Value Taken Time   /86 03/29/24 1811   Temp     Pulse 79 03/29/24 1812   Resp 12 03/29/24 1805   SpO2 93 % 03/29/24 1812   Vitals shown include unfiled device data.        Post Anesthesia Care and Evaluation    Patient location during evaluation: PACU  Patient participation: complete - patient participated  Level of consciousness: awake  Pain scale: See nurse's notes for pain score.  Pain management: adequate    Airway patency: patent  Anesthetic complications: No anesthetic complications  PONV Status: none  Cardiovascular status: acceptable  Respiratory status: acceptable and spontaneous ventilation  Hydration status: acceptable    Comments: Patient seen and examined postoperatively; vital signs stable; SpO2 greater than or equal to 90%; cardiopulmonary status stable; nausea/vomiting adequately controlled; pain adequately controlled; no apparent anesthesia complications; patient discharged from anesthesia care when discharge criteria were met

## 2024-03-29 NOTE — PLAN OF CARE
Goal Outcome Evaluation:  Plan of Care Reviewed With: patient           Outcome Evaluation: 89 yo male admitted with feeling of food stuck in esophagus 3/26.  Pt reports he burned potatoes on the stove and then ate some and the food got stuck.  Patient underwent EGD yesterday which showed that food impaction had spontaneously passed. Following the procedure, the patient reported that he was unable to swallow.  Bedside RN reported coughing and stridor with attempts at drinking water. Pt has failed swallow study with SLP. 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 This date patient alert and oriented x4. He performed bed mobility with SBA. At EOB, noted that he was incontinent of urine, Min A provided to change soiled clothing to clean. Min A needed to come to standing, as well as for ambulation in hallway without AD. Pt loses balance toward left and posterior, but has very good awareness of deficits and corrects well. BUEs still have functional strength and only minor coordination deficits noted in the left UE, slight dysmetria with finger to nose testing. With balance and coodination deficits noted, he requires increased assistance with ADLs and functional mobility. Unsafe to return home alone, will require acute IP rehab at [place]. OT will follow.      Anticipated Discharge Disposition (OT): inpatient rehabilitation facility

## 2024-03-29 NOTE — PROGRESS NOTES
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 on MRI of the brain 3/28/2024        Recommendations:  Patient still having productive cough of greenish sputum, continue Unasyn  Encourage use of incentive spirometry  Guaifenesin  Oxygen supplement and titration to maintain saturation 90 to 95%: Currently on room air  Speech pathology following and planning video-assisted swallow study  Followed by GI  Neurology consulted     Patient will need CT scan of the chest without contrast in 1 year     I personally reviewed the radiological studies               LOS: 2 days     Subjective     Patient reports productive cough, dysphagia    Objective     Vital signs for last 24 hours:  Vitals:    03/28/24 2149 03/28/24 2357 03/29/24 0455 03/29/24 0824   BP: 127/55 123/61 122/68 142/72   BP Location: Left arm Left arm Left arm Left arm   Patient Position: Lying Lying Lying Lying   Pulse: 78 87 78 71   Resp: 11 14 13 11   Temp: 98.7 °F (37.1 °C) 98.2 °F (36.8 °C) 98.2 °F (36.8 °C) 97.6 °F (36.4 °C)   TempSrc: Oral Oral Oral Oral   SpO2: 92% 95% 94% 93%   Weight:       Height:           Intake/Output last 3 shifts:  I/O last 3 completed shifts:  In: -   Out: 450 [Urine:450]  Intake/Output this shift:  No intake/output data recorded.      Radiology  Imaging Results (Last 24 Hours)       Procedure Component Value Units Date/Time    MRI Cervical Spine With & Without Contrast [[id number]] Collected: 03/28/24 1718     Updated: 03/28/24 1733    Narrative:      MRI CERVICAL SPINE W WO CONTRAST    Date of Exam: 3/28/2024 4:18 PM EDT    Indication: roule out stroke.     Comparison: None  available.    Technique:  Routine multiplanar/multisequence sequence images of the cervical spine were obtained before and after the uneventful administration of Prohance.        Findings:  Some of the sequences are limited by motion. The cervical cord is normal in caliber. There is no evident cord edema or abnormal cord enhancement. There is no abnormal fluid collection within the central canal. There is normal generalized marrow signal   with some degenerative edema in the left articular processes of C6 and C7. There is no paraspinal fluid collection.    C2-C3: No disc protrusion. No central canal or foraminal stenosis    C3-C4: No significant disc protrusion. Bilateral facet arthropathy. Right foraminal stenosis due to uncovertebral hypertrophy    C4-C5: Mild broad-based disc-osteophyte complex which abuts but does not compress the cord. Left facet arthropathy resulting in minimal C4 anterolisthesis. Mild left foraminal stenosis due to facet hypertrophy    C5-C6: Mild broad-based disc-osteophyte complex which abuts but does not compress the cord. Left facet arthropathy. No significant foraminal stenosis    C6-C7: Broad-based disc protrusion which abuts but does not clearly compress the cord. Left facet arthropathy. No significant foraminal stenosis    C7-T1: No disc protrusion. No central canal or foraminal stenosis      Impression:      Impression:    1. No findings to suggest cervical cord infarct  2. Cervical degenerative disease        Electronically Signed: Frederic Stark    3/28/2024 5:31 PM EDT    Workstation ID: [id number]    MRI Brain Without Contrast [[id number]] Collected: 03/28/24 1649     Updated: 03/28/24 1703    Narrative:      MRI BRAIN WO CONTRAST    Date of Exam: 3/28/2024 4:18 PM EDT    Indication: Stroke suspected (Ped 0-17y).     Comparison: None available.    Technique:  Routine multiplanar/multisequence sequence images of the brain were obtained without contrast  administration.      Findings:    Motion artifact limits diagnostic sensitivity.  Restricted diffusion is visualized in the left lateral medulla measuring 0.8 x 0.9 cm. No evidence of mass or mass effect. There is associated subtle T2 hyperintensity.  Age-related illusional changes are visualized. Bilateral supratentorial periventricular white matter T2/FLAIR hyperintensities are visualized with associated small foci of gliosis. There is an old infarct adjacent to the frontal horn of the left lateral   ventricle measuring 7 mm with surrounding gliosis.  There is no evidence of acute or chronic intracranial hemorrhage.   No mass effect or midline shift.] [No abnormal extra-axial collections.  The major vascular flow voids appear intact.   The basal ganglia and cerebellum appear within normal limits.   Calvarial and superficial soft tissue signal is within normal limits.   Orbits appear unremarkable.   The paranasal sinuses and the mastoid air cells appear well aerated.   Midline structures are intact.         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    Age-related illusional changes. Moderate changes of chronic microvessel ischemia.    Findings relayed to JUAN DAVID Friedman on March 28, 2024 at 5:01 p.m. by telephone.        Electronically Signed: Aiden Saleem MD    3/28/2024 5:01 PM EDT    Workstation ID: [id number]    XR Orbits 4+ View [[id number]] Collected: 03/28/24 1508     Updated: 03/28/24 1511    Narrative:      XR ORBITS 4+ VW    Date of Exam: 3/28/2024 3:04 PM EDT    Indication: rule out metal inthe eyes    Comparison: None available.    Findings: No retained radiopaque foreign body is seen within the orbital or immediate periorbital soft tissues. Presumed hearing aid on the right, external to the scalp. Rounded calcific density projects over the left frontal scalp and may represent a   small bone island or osteoma.      Impression:       Impression:  No unexpected retained radiopaque foreign body is seen within the orbital or periorbital soft tissues.      Electronically Signed: Venice Pinto MD    3/28/2024 3:09 PM EDT    Workstation ID: [id number]            Labs:  Results from last 7 days   Lab Units 03/29/24  0020   WBC 10*3/mm3 12.74*   HEMOGLOBIN g/dL 14.4   HEMATOCRIT % 44.4   PLATELETS 10*3/mm3 239     Results from last 7 days   Lab Units 03/29/24  0503   SODIUM mmol/L 142   POTASSIUM mmol/L 3.8   CHLORIDE mmol/L 107   CO2 mmol/L 24.0   BUN mg/dL 23   CREATININE mg/dL 1.46*   CALCIUM mg/dL 9.0   GLUCOSE mg/dL 78                             Results from last 7 days   Lab Units 03/28/24  1550   TSH uIU/mL 2.770   FREE T4 ng/dL 1.02           Meds:   SCHEDULE  ampicillin-sulbactam, 3 g, Intravenous, Q6H  cyanocobalamin, 1,000 mcg, Intramuscular, Daily  guaiFENesin-codeine, 10 mL, Oral, TID  polyethylene glycol, 17 g, Oral, Daily  sodium chloride, 10 mL, Intravenous, Q12H      Infusions  lactated ringers, 75 mL/hr, Last Rate: 75 mL/hr (03/29/24 0823)      PRNs    acetaminophen    senna-docusate sodium **AND** polyethylene glycol **AND** bisacodyl **AND** bisacodyl    melatonin    ondansetron ODT **OR** ondansetron    [COMPLETED] Insert Peripheral IV **AND** sodium chloride    sodium chloride    sodium chloride    Physical Exam:  General Appearance:  Alert   HEENT:  Normocephalic, without obvious abnormality, Conjunctiva/corneas clear,.   Nares normal, no drainage     Neck:  Supple, symmetrical, trachea midline.   Lungs /Chest wall: Good respiratory effort with mild bilateral basal rhonchi, respirations unlabored, symmetrical wall movement.     Heart:  Regular rate and rhythm, S1 S2 normal  Abdomen: Soft, non-tender, no masses, no organomegaly.    Extremities: No edema, no clubbing or cyanosis     ROS  Constitutional: Negative for chills, fever and malaise/fatigue.   HENT: Negative.    Eyes: Negative.    Cardiovascular: Negative.    Respiratory:  Positive for cough.    Skin: Negative.    Musculoskeletal: Negative.    Gastrointestinal: Dysphagia  Genitourinary: Negative.    Psychiatric/Behavioral: Negative.      I reviewed the recent clinical results  I personally reviewed the latest radiological studies    Part of this note may be an electronic transcription/translation of spoken language to printed text using the Dragon Dictation System.

## 2024-03-30 ENCOUNTER — APPOINTMENT (OUTPATIENT)
Dept: CT IMAGING | Facility: HOSPITAL | Age: 89
DRG: 393 | End: 2024-03-30
Payer: MEDICARE

## 2024-03-30 LAB
ALBUMIN SERPL-MCNC: 3.5 G/DL (ref 3.5–5.2)
ALBUMIN/GLOB SERPL: 1.3 G/DL
ALP SERPL-CCNC: 62 U/L (ref 39–117)
ALT SERPL W P-5'-P-CCNC: 23 U/L (ref 1–41)
ANION GAP SERPL CALCULATED.3IONS-SCNC: 11 MMOL/L (ref 5–15)
ANION GAP SERPL CALCULATED.3IONS-SCNC: 11 MMOL/L (ref 5–15)
AST SERPL-CCNC: 36 U/L (ref 1–40)
BASOPHILS # BLD AUTO: 0.06 10*3/MM3 (ref 0–0.2)
BASOPHILS NFR BLD AUTO: 0.6 % (ref 0–1.5)
BILIRUB SERPL-MCNC: 0.7 MG/DL (ref 0–1.2)
BUN SERPL-MCNC: 18 MG/DL (ref 8–23)
BUN SERPL-MCNC: 24 MG/DL (ref 8–23)
BUN/CREAT SERPL: 14.6 (ref 7–25)
BUN/CREAT SERPL: 17.6 (ref 7–25)
CALCIUM SPEC-SCNC: 9 MG/DL (ref 8.6–10.5)
CALCIUM SPEC-SCNC: 9.2 MG/DL (ref 8.6–10.5)
CHLORIDE SERPL-SCNC: 106 MMOL/L (ref 98–107)
CHLORIDE SERPL-SCNC: 106 MMOL/L (ref 98–107)
CO2 SERPL-SCNC: 23 MMOL/L (ref 22–29)
CO2 SERPL-SCNC: 27 MMOL/L (ref 22–29)
CREAT SERPL-MCNC: 1.23 MG/DL (ref 0.76–1.27)
CREAT SERPL-MCNC: 1.36 MG/DL (ref 0.76–1.27)
DEPRECATED RDW RBC AUTO: 41 FL (ref 37–54)
EGFRCR SERPLBLD CKD-EPI 2021: 50.1 ML/MIN/1.73
EGFRCR SERPLBLD CKD-EPI 2021: 56.5 ML/MIN/1.73
EOSINOPHIL # BLD AUTO: 0.15 10*3/MM3 (ref 0–0.4)
EOSINOPHIL NFR BLD AUTO: 1.5 % (ref 0.3–6.2)
ERYTHROCYTE [DISTWIDTH] IN BLOOD BY AUTOMATED COUNT: 12.6 % (ref 12.3–15.4)
GLOBULIN UR ELPH-MCNC: 2.7 GM/DL
GLUCOSE BLDC GLUCOMTR-MCNC: 107 MG/DL (ref 70–105)
GLUCOSE BLDC GLUCOMTR-MCNC: 81 MG/DL (ref 70–105)
GLUCOSE BLDC GLUCOMTR-MCNC: 93 MG/DL (ref 70–105)
GLUCOSE BLDC GLUCOMTR-MCNC: 95 MG/DL (ref 70–105)
GLUCOSE SERPL-MCNC: 101 MG/DL (ref 65–99)
GLUCOSE SERPL-MCNC: 102 MG/DL (ref 65–99)
HCT VFR BLD AUTO: 45 % (ref 37.5–51)
HGB BLD-MCNC: 14 G/DL (ref 13–17.7)
IMM GRANULOCYTES # BLD AUTO: 0.06 10*3/MM3 (ref 0–0.05)
IMM GRANULOCYTES NFR BLD AUTO: 0.6 % (ref 0–0.5)
LYMPHOCYTES # BLD AUTO: 0.66 10*3/MM3 (ref 0.7–3.1)
LYMPHOCYTES NFR BLD AUTO: 6.4 % (ref 19.6–45.3)
MCH RBC QN AUTO: 27.7 PG (ref 26.6–33)
MCHC RBC AUTO-ENTMCNC: 31.1 G/DL (ref 31.5–35.7)
MCV RBC AUTO: 88.9 FL (ref 79–97)
MONOCYTES # BLD AUTO: 0.75 10*3/MM3 (ref 0.1–0.9)
MONOCYTES NFR BLD AUTO: 7.3 % (ref 5–12)
NEUTROPHILS NFR BLD AUTO: 8.64 10*3/MM3 (ref 1.7–7)
NEUTROPHILS NFR BLD AUTO: 83.6 % (ref 42.7–76)
NRBC BLD AUTO-RTO: 0 /100 WBC (ref 0–0.2)
PLATELET # BLD AUTO: 212 10*3/MM3 (ref 140–450)
PMV BLD AUTO: 9.8 FL (ref 6–12)
POTASSIUM SERPL-SCNC: 3.4 MMOL/L (ref 3.5–5.2)
POTASSIUM SERPL-SCNC: 4.1 MMOL/L (ref 3.5–5.2)
POTASSIUM SERPL-SCNC: 4.1 MMOL/L (ref 3.5–5.2)
PROT SERPL-MCNC: 6.2 G/DL (ref 6–8.5)
RBC # BLD AUTO: 5.06 10*6/MM3 (ref 4.14–5.8)
SODIUM SERPL-SCNC: 140 MMOL/L (ref 136–145)
SODIUM SERPL-SCNC: 144 MMOL/L (ref 136–145)
WBC NRBC COR # BLD AUTO: 10.32 10*3/MM3 (ref 3.4–10.8)

## 2024-03-30 PROCEDURE — 25010000002 AMPICILLIN-SULBACTAM PER 1.5 G: Performed by: INTERNAL MEDICINE

## 2024-03-30 PROCEDURE — 25510000001 IOPAMIDOL PER 1 ML: Performed by: INTERNAL MEDICINE

## 2024-03-30 PROCEDURE — 25010000002 CYANOCOBALAMIN PER 1000 MCG: Performed by: NURSE PRACTITIONER

## 2024-03-30 PROCEDURE — 82948 REAGENT STRIP/BLOOD GLUCOSE: CPT

## 2024-03-30 PROCEDURE — 25810000003 LACTATED RINGERS PER 1000 ML: Performed by: STUDENT IN AN ORGANIZED HEALTH CARE EDUCATION/TRAINING PROGRAM

## 2024-03-30 PROCEDURE — 80053 COMPREHEN METABOLIC PANEL: CPT | Performed by: INTERNAL MEDICINE

## 2024-03-30 PROCEDURE — 70496 CT ANGIOGRAPHY HEAD: CPT

## 2024-03-30 PROCEDURE — 84132 ASSAY OF SERUM POTASSIUM: CPT | Performed by: INTERNAL MEDICINE

## 2024-03-30 PROCEDURE — 70498 CT ANGIOGRAPHY NECK: CPT

## 2024-03-30 PROCEDURE — 85025 COMPLETE CBC W/AUTO DIFF WBC: CPT | Performed by: INTERNAL MEDICINE

## 2024-03-30 RX ORDER — POTASSIUM CHLORIDE 1.5 G/1.58G
40 POWDER, FOR SOLUTION ORAL EVERY 4 HOURS
Status: COMPLETED | OUTPATIENT
Start: 2024-03-30 | End: 2024-03-30

## 2024-03-30 RX ORDER — AMLODIPINE BESYLATE 5 MG/1
5 TABLET ORAL
Status: DISCONTINUED | OUTPATIENT
Start: 2024-03-30 | End: 2024-03-30

## 2024-03-30 RX ORDER — ACETAMINOPHEN 325 MG/1
650 TABLET ORAL EVERY 4 HOURS PRN
Status: DISCONTINUED | OUTPATIENT
Start: 2024-03-30 | End: 2024-03-31 | Stop reason: HOSPADM

## 2024-03-30 RX ORDER — CHOLECALCIFEROL (VITAMIN D3) 125 MCG
5 CAPSULE ORAL NIGHTLY PRN
Status: DISCONTINUED | OUTPATIENT
Start: 2024-03-30 | End: 2024-03-31 | Stop reason: HOSPADM

## 2024-03-30 RX ORDER — ONDANSETRON 4 MG/1
4 TABLET, ORALLY DISINTEGRATING ORAL EVERY 6 HOURS PRN
Status: DISCONTINUED | OUTPATIENT
Start: 2024-03-30 | End: 2024-03-31 | Stop reason: HOSPADM

## 2024-03-30 RX ORDER — BISACODYL 5 MG/1
5 TABLET, DELAYED RELEASE ORAL DAILY PRN
Status: DISCONTINUED | OUTPATIENT
Start: 2024-03-30 | End: 2024-03-31 | Stop reason: HOSPADM

## 2024-03-30 RX ORDER — BISACODYL 10 MG
10 SUPPOSITORY, RECTAL RECTAL DAILY PRN
Status: DISCONTINUED | OUTPATIENT
Start: 2024-03-30 | End: 2024-03-31 | Stop reason: HOSPADM

## 2024-03-30 RX ORDER — POLYETHYLENE GLYCOL 3350 17 G/17G
17 POWDER, FOR SOLUTION ORAL DAILY PRN
Status: DISCONTINUED | OUTPATIENT
Start: 2024-03-30 | End: 2024-03-31 | Stop reason: HOSPADM

## 2024-03-30 RX ORDER — POLYETHYLENE GLYCOL 3350 17 G/17G
17 POWDER, FOR SOLUTION ORAL DAILY
Status: DISCONTINUED | OUTPATIENT
Start: 2024-03-31 | End: 2024-03-31 | Stop reason: HOSPADM

## 2024-03-30 RX ORDER — AMLODIPINE BESYLATE 5 MG/1
5 TABLET ORAL
Status: DISCONTINUED | OUTPATIENT
Start: 2024-03-31 | End: 2024-03-31 | Stop reason: HOSPADM

## 2024-03-30 RX ORDER — AMOXICILLIN 250 MG
2 CAPSULE ORAL 2 TIMES DAILY PRN
Status: DISCONTINUED | OUTPATIENT
Start: 2024-03-30 | End: 2024-03-31 | Stop reason: HOSPADM

## 2024-03-30 RX ORDER — ONDANSETRON 2 MG/ML
4 INJECTION INTRAMUSCULAR; INTRAVENOUS EVERY 6 HOURS PRN
Status: DISCONTINUED | OUTPATIENT
Start: 2024-03-30 | End: 2024-03-31 | Stop reason: HOSPADM

## 2024-03-30 RX ADMIN — GUAIFENESIN AND CODEINE PHOSPHATE 10 ML: 100; 10 SOLUTION ORAL at 18:01

## 2024-03-30 RX ADMIN — SODIUM CHLORIDE, POTASSIUM CHLORIDE, SODIUM LACTATE AND CALCIUM CHLORIDE 75 ML/HR: 600; 310; 30; 20 INJECTION, SOLUTION INTRAVENOUS at 22:08

## 2024-03-30 RX ADMIN — IOPAMIDOL 100 ML: 755 INJECTION, SOLUTION INTRAVENOUS at 10:10

## 2024-03-30 RX ADMIN — AMPICILLIN SODIUM AND SULBACTAM SODIUM 3 G: 2; 1 INJECTION, POWDER, FOR SOLUTION INTRAMUSCULAR; INTRAVENOUS at 04:44

## 2024-03-30 RX ADMIN — GUAIFENESIN AND CODEINE PHOSPHATE 10 ML: 100; 10 SOLUTION ORAL at 11:40

## 2024-03-30 RX ADMIN — Medication 10 ML: at 09:34

## 2024-03-30 RX ADMIN — CYANOCOBALAMIN 1000 MCG: 1000 INJECTION, SOLUTION INTRAMUSCULAR; SUBCUTANEOUS at 09:35

## 2024-03-30 RX ADMIN — POTASSIUM CHLORIDE 40 MEQ: 1.5 POWDER, FOR SOLUTION ORAL at 09:35

## 2024-03-30 RX ADMIN — GUAIFENESIN AND CODEINE PHOSPHATE 10 ML: 100; 10 SOLUTION ORAL at 22:07

## 2024-03-30 RX ADMIN — AMPICILLIN SODIUM AND SULBACTAM SODIUM 3 G: 2; 1 INJECTION, POWDER, FOR SOLUTION INTRAMUSCULAR; INTRAVENOUS at 10:59

## 2024-03-30 RX ADMIN — POTASSIUM CHLORIDE 40 MEQ: 1.5 POWDER, FOR SOLUTION ORAL at 14:21

## 2024-03-30 RX ADMIN — AMLODIPINE BESYLATE 5 MG: 5 TABLET ORAL at 14:21

## 2024-03-30 RX ADMIN — Medication 10 ML: at 23:41

## 2024-03-30 RX ADMIN — SODIUM CHLORIDE, POTASSIUM CHLORIDE, SODIUM LACTATE AND CALCIUM CHLORIDE 75 ML/HR: 600; 310; 30; 20 INJECTION, SOLUTION INTRAVENOUS at 06:03

## 2024-03-30 RX ADMIN — POLYETHYLENE GLYCOL 3350 17 G: 17 POWDER, FOR SOLUTION ORAL at 09:36

## 2024-03-30 RX ADMIN — AMPICILLIN SODIUM AND SULBACTAM SODIUM 3 G: 2; 1 INJECTION, POWDER, FOR SOLUTION INTRAMUSCULAR; INTRAVENOUS at 22:25

## 2024-03-30 RX ADMIN — AMPICILLIN SODIUM AND SULBACTAM SODIUM 3 G: 2; 1 INJECTION, POWDER, FOR SOLUTION INTRAMUSCULAR; INTRAVENOUS at 16:01

## 2024-03-30 NOTE — PROGRESS NOTES
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 on MRI of the brain 3/28/2024  Status post PEG placement 3/29/2024     Echo 3/29/2024: LV EF 61-65%, grade 1 diastolic dysfunction, mild mitral regurgitation, normal RV systolic pressure     Recommendations:  Patient still having productive cough of greenish sputum, continue Unasyn  Encourage use of incentive spirometry  Guaifenesin  Oxygen supplement and titration to maintain saturation 90 to 95%: Currently on room air    Followed by GI  Neurology consulted     Patient will need CT scan of the chest without contrast in 1 year     I personally reviewed the radiological studies               LOS: 3 days     Subjective     Patient reports productive cough    Objective     Vital signs for last 24 hours:  Vitals:    03/29/24 2235 03/30/24 0000 03/30/24 0452 03/30/24 0455   BP:  178/78  145/79   BP Location:  Right arm  Right arm   Patient Position:  Lying  Lying   Pulse:  76  77   Resp:  14  11   Temp:  97.7 °F (36.5 °C)  98.3 °F (36.8 °C)   TempSrc:  Oral  Oral   SpO2:  98%  94%   Weight: 83.8 kg (184 lb 11.9 oz) 84.3 kg (185 lb 13.6 oz) 82.3 kg (181 lb 7 oz)    Height:           Intake/Output last 3 shifts:  I/O last 3 completed shifts:  In: 3390 [P.O.:240; I.V.:2900; IV Piggyback:250]  Out: 820 [Urine:820]  Intake/Output this shift:  No intake/output data recorded.      Radiology  Imaging Results (Last 24 Hours)       ** No results found for the last 24 hours. **            Labs:  Results from last 7 days   Lab Units 03/30/24  0038   WBC 10*3/mm3 10.32   HEMOGLOBIN g/dL 14.0   HEMATOCRIT % 45.0   PLATELETS 10*3/mm3  212     Results from last 7 days   Lab Units 03/30/24  0038   SODIUM mmol/L 144   POTASSIUM mmol/L 3.4*   CHLORIDE mmol/L 106   CO2 mmol/L 27.0   BUN mg/dL 24*   CREATININE mg/dL 1.36*   CALCIUM mg/dL 9.0   GLUCOSE mg/dL 101*                         Results from last 7 days   Lab Units 03/29/24  1142   INR  1.07     Results from last 7 days   Lab Units 03/28/24  1550   TSH uIU/mL 2.770   FREE T4 ng/dL 1.02           Meds:   SCHEDULE  ampicillin-sulbactam, 3 g, Intravenous, Q6H  cyanocobalamin, 1,000 mcg, Intramuscular, Daily  guaiFENesin-codeine, 10 mL, Oral, TID  polyethylene glycol, 17 g, Oral, Daily  potassium chloride, 40 mEq, Oral, Q4H  sodium chloride, 10 mL, Intravenous, Q12H      Infusions  dextrose 5 % and sodium chloride 0.45 %, 50 mL/hr, Last Rate: 50 mL/hr (03/29/24 2230)  lactated ringers, 75 mL/hr, Last Rate: 75 mL/hr (03/30/24 0603)  sodium chloride, 9 mL/hr      PRNs    acetaminophen    senna-docusate sodium **AND** polyethylene glycol **AND** bisacodyl **AND** bisacodyl    Calcium Replacement - Follow Nurse / BPA Driven Protocol    dextrose    dextrose    glucagon (human recombinant)    Magnesium Standard Dose Replacement - Follow Nurse / BPA Driven Protocol    melatonin    ondansetron ODT **OR** ondansetron    Phosphorus Replacement - Follow Nurse / BPA Driven Protocol    Potassium Replacement - Follow Nurse / BPA Driven Protocol    [COMPLETED] Insert Peripheral IV **AND** sodium chloride    sodium chloride    sodium chloride    sodium chloride    Physical Exam:  General Appearance:  Alert   HEENT:  Normocephalic, without obvious abnormality, Conjunctiva/corneas clear,.   Nares normal, no drainage     Neck:  Supple, symmetrical, trachea midline.   Lungs /Chest wall: Good respiratory effort with mild bilateral basal rhonchi, respirations unlabored, symmetrical wall movement.     Heart:  Regular rate and rhythm, S1 S2 normal  Abdomen: Soft, non-tender, no masses, no organomegaly.    Extremities: No  edema, no clubbing or cyanosis     ROS  Constitutional: Negative for chills, fever and malaise/fatigue.   HENT: Negative.    Eyes: Negative.    Cardiovascular: Negative.    Respiratory: Positive for cough.    Skin: Negative.    Musculoskeletal: Negative.    Gastrointestinal: Dysphagia  Genitourinary: Negative.    Psychiatric/Behavioral: Negative.      I reviewed the recent clinical results  I personally reviewed the latest radiological studies    Part of this note may be an electronic transcription/translation of spoken language to printed text using the Dragon Dictation System.

## 2024-03-30 NOTE — NURSING NOTE
During nursing round, patient was noted to be covered with blankets. He complained of being shaky inside. Blood sugar was checked and resulted to 69 mg/dl. Provider on call notified of the situation. Order to give D50W was received. Same was administered to patient. Blood sugar misael to 200 mg/dl. Patient verbalized he was not feeling shaky any more. Will continue to monitor.

## 2024-03-30 NOTE — NURSING NOTE
Patient cloth noted to be stained with blood. Upon closer assessment, blood was seen at peg stoma site and surroundings. No active bleeding noted. No distress was observed. Patient was cleaned up and new dressing placed at stoma site. RN will continue to monitor.

## 2024-03-30 NOTE — PLAN OF CARE
Problem: Adult Inpatient Plan of Care  Goal: Plan of Care Review  Outcome: Ongoing, Progressing  Goal: Patient-Specific Goal (Individualized)  Outcome: Ongoing, Progressing  Goal: Absence of Hospital-Acquired Illness or Injury  Outcome: Ongoing, Progressing  Intervention: Identify and Manage Fall Risk  Recent Flowsheet Documentation  Taken 3/30/2024 1452 by Danya Foster RN  Safety Promotion/Fall Prevention:   activity supervised   assistive device/personal items within reach   clutter free environment maintained   fall prevention program maintained   nonskid shoes/slippers when out of bed   room organization consistent   safety round/check completed  Taken 3/30/2024 1400 by Danya Foster RN  Safety Promotion/Fall Prevention:   activity supervised   assistive device/personal items within reach   clutter free environment maintained   fall prevention program maintained   nonskid shoes/slippers when out of bed   room organization consistent   safety round/check completed  Taken 3/30/2024 1300 by Danya Foster RN  Safety Promotion/Fall Prevention:   activity supervised   assistive device/personal items within reach   clutter free environment maintained   fall prevention program maintained   nonskid shoes/slippers when out of bed   room organization consistent   safety round/check completed  Taken 3/30/2024 1200 by Danya Foster RN  Safety Promotion/Fall Prevention:   activity supervised   assistive device/personal items within reach   clutter free environment maintained   fall prevention program maintained   nonskid shoes/slippers when out of bed   room organization consistent   safety round/check completed  Taken 3/30/2024 0800 by Danya Foster RN  Safety Promotion/Fall Prevention:   activity supervised   assistive device/personal items within reach   clutter free environment maintained   fall prevention program maintained   nonskid shoes/slippers when out of bed   room organization consistent    safety round/check completed  Intervention: Prevent Skin Injury  Recent Flowsheet Documentation  Taken 3/30/2024 1200 by Danya Foster RN  Body Position: position changed independently  Skin Protection:   adhesive use limited   incontinence pads utilized   transparent dressing maintained   tubing/devices free from skin contact  Taken 3/30/2024 0800 by Danya Foster RN  Body Position: position changed independently  Skin Protection:   adhesive use limited   incontinence pads utilized   tubing/devices free from skin contact   transparent dressing maintained  Intervention: Prevent and Manage VTE (Venous Thromboembolism) Risk  Recent Flowsheet Documentation  Taken 3/30/2024 1200 by Danya Foster RN  Activity Management: activity encouraged  VTE Prevention/Management:   bilateral   sequential compression devices on  Taken 3/30/2024 0800 by Danya Foster RN  Activity Management: activity encouraged  Intervention: Prevent Infection  Recent Flowsheet Documentation  Taken 3/30/2024 1452 by Danya Foster RN  Infection Prevention:   environmental surveillance performed   hand hygiene promoted   rest/sleep promoted   single patient room provided  Taken 3/30/2024 1400 by Danya Foster RN  Infection Prevention:   environmental surveillance performed   hand hygiene promoted   rest/sleep promoted   single patient room provided  Taken 3/30/2024 1300 by Danya Foster RN  Infection Prevention:   environmental surveillance performed   hand hygiene promoted   rest/sleep promoted   single patient room provided  Taken 3/30/2024 1200 by Danya Foster RN  Infection Prevention:   environmental surveillance performed   hand hygiene promoted   rest/sleep promoted   single patient room provided  Taken 3/30/2024 0800 by Danya Foster RN  Infection Prevention:   environmental surveillance performed   hand hygiene promoted   rest/sleep promoted   single patient room provided  Goal: Optimal Comfort and  Wellbeing  Outcome: Ongoing, Progressing  Intervention: Provide Person-Centered Care  Recent Flowsheet Documentation  Taken 3/30/2024 1200 by Danya Foster RN  Trust Relationship/Rapport:   care explained   emotional support provided   empathic listening provided   choices provided   questions encouraged   reassurance provided   questions answered   thoughts/feelings acknowledged  Taken 3/30/2024 0800 by Danya Foster RN  Trust Relationship/Rapport:   care explained   choices provided   emotional support provided   empathic listening provided   questions encouraged   reassurance provided   questions answered   thoughts/feelings acknowledged  Goal: Readiness for Transition of Care  Outcome: Ongoing, Progressing     Problem: Swallowing Impairment  Goal: Optimal Eating/Swallowing without Aspir  Outcome: Ongoing, Progressing     Problem: Suicide Risk  Goal: Absence of Self-Harm  Outcome: Ongoing, Progressing  Intervention: Promote Psychosocial Wellbeing  Recent Flowsheet Documentation  Taken 3/30/2024 1200 by Danya Foster RN  Family/Support System Care:   support provided   self-care encouraged  Taken 3/30/2024 0800 by Danya Foster RN  Family/Support System Care:   self-care encouraged   support provided  Sleep/Rest Enhancement: consistent schedule promoted     Problem: Aspiration (Enteral Nutrition)  Goal: Absence of Aspiration Signs and Symptoms  Outcome: Ongoing, Progressing  Intervention: Minimize Aspiration Risk  Recent Flowsheet Documentation  Taken 3/30/2024 1200 by Danya Foster RN  Head of Bed (HOB) Positioning: HOB elevated  Enteral Feeding Safety: placement verified by x-ray  Taken 3/30/2024 1030 by Danya Foster RN  Enteral Feeding Safety: placement verified by x-ray  Taken 3/30/2024 0800 by Danya Fsoter RN  Head of Bed (HOB) Positioning:   HOB elevated   HOB at 30 degrees     Problem: Device-Related Complication Risk (Enteral Nutrition)  Goal: Safe, Effective Therapy  Delivery  Outcome: Ongoing, Progressing  Intervention: Prevent Feeding-Related Adverse Events  Recent Flowsheet Documentation  Taken 3/30/2024 1200 by Danya Foster RN  Enteral Feeding Safety: placement verified by x-ray  Taken 3/30/2024 1030 by Danya Foster RN  Enteral Feeding Safety: placement verified by x-ray     Problem: Feeding Intolerance (Enteral Nutrition)  Goal: Feeding Tolerance  Outcome: Ongoing, Progressing  Intervention: Prevent and Manage Feeding Intolerance  Recent Flowsheet Documentation  Taken 3/30/2024 1030 by Danya Foster RN  Nutrition Support Management: tube feeding initiated     Problem: Skin Injury Risk Increased  Goal: Skin Health and Integrity  Outcome: Ongoing, Progressing  Intervention: Optimize Skin Protection  Recent Flowsheet Documentation  Taken 3/30/2024 1200 by Danya Foster RN  Pressure Reduction Techniques:   frequent weight shift encouraged   weight shift assistance provided  Head of Bed (HOB) Positioning: HOB elevated  Pressure Reduction Devices: pressure-redistributing mattress utilized  Skin Protection:   adhesive use limited   incontinence pads utilized   transparent dressing maintained   tubing/devices free from skin contact  Taken 3/30/2024 0800 by Danya Foster RN  Pressure Reduction Techniques:   frequent weight shift encouraged   weight shift assistance provided  Head of Bed (HOB) Positioning:   HOB elevated   HOB at 30 degrees  Pressure Reduction Devices: pressure-redistributing mattress utilized  Skin Protection:   adhesive use limited   incontinence pads utilized   tubing/devices free from skin contact   transparent dressing maintained   Goal Outcome Evaluation:

## 2024-03-30 NOTE — PLAN OF CARE
Problem: Adult Inpatient Plan of Care  Goal: Plan of Care Review  Outcome: Ongoing, Progressing  Goal: Patient-Specific Goal (Individualized)  Outcome: Ongoing, Progressing  Goal: Absence of Hospital-Acquired Illness or Injury  Outcome: Ongoing, Progressing  Intervention: Identify and Manage Fall Risk  Recent Flowsheet Documentation  Taken 3/30/2024 0000 by Julia Gonzales RN  Safety Promotion/Fall Prevention:   safety round/check completed   room organization consistent   fall prevention program maintained   clutter free environment maintained   assistive device/personal items within reach  Taken 3/29/2024 2200 by Julia Gonzales RN  Safety Promotion/Fall Prevention:   safety round/check completed   room organization consistent   fall prevention program maintained   clutter free environment maintained   assistive device/personal items within reach  Taken 3/29/2024 2000 by Julia Gonzales RN  Safety Promotion/Fall Prevention:   safety round/check completed   room organization consistent   fall prevention program maintained   clutter free environment maintained   assistive device/personal items within reach  Intervention: Prevent Skin Injury  Recent Flowsheet Documentation  Taken 3/30/2024 0000 by Julia Gonzales RN  Body Position: position changed independently  Skin Protection: adhesive use limited  Taken 3/29/2024 2000 by Julia Gonzales RN  Body Position: position changed independently  Skin Protection: adhesive use limited  Intervention: Prevent and Manage VTE (Venous Thromboembolism) Risk  Recent Flowsheet Documentation  Taken 3/30/2024 0000 by Julia Gonzales RN  Activity Management: activity encouraged  Taken 3/29/2024 2000 by Julia Gonzales RN  Activity Management: activity encouraged  Intervention: Prevent Infection  Recent Flowsheet Documentation  Taken 3/30/2024 0000 by Julia Gonzales RN  Infection Prevention:   single patient room provided   rest/sleep promoted    personal protective equipment utilized   hand hygiene promoted   equipment surfaces disinfected   environmental surveillance performed  Taken 3/29/2024 2200 by Julia Gonzales RN  Infection Prevention:   single patient room provided   rest/sleep promoted   personal protective equipment utilized   hand hygiene promoted   equipment surfaces disinfected   environmental surveillance performed  Taken 3/29/2024 2000 by Julia Gonzales RN  Infection Prevention:   single patient room provided   rest/sleep promoted   personal protective equipment utilized   hand hygiene promoted   environmental surveillance performed   equipment surfaces disinfected  Goal: Optimal Comfort and Wellbeing  Outcome: Ongoing, Progressing  Intervention: Provide Person-Centered Care  Recent Flowsheet Documentation  Taken 3/30/2024 0000 by Julia Gonzales RN  Trust Relationship/Rapport: care explained  Taken 3/29/2024 2000 by Julia Gonzales RN  Trust Relationship/Rapport: care explained  Goal: Readiness for Transition of Care  Outcome: Ongoing, Progressing     Problem: Swallowing Impairment  Goal: Optimal Eating/Swallowing without Aspir  Outcome: Ongoing, Progressing     Problem: Suicide Risk  Goal: Absence of Self-Harm  Outcome: Ongoing, Progressing  Intervention: Promote Psychosocial Wellbeing  Recent Flowsheet Documentation  Taken 3/29/2024 2000 by Julia Gonzales RN  Family/Support System Care:   self-care encouraged   support provided  Sleep/Rest Enhancement:   awakenings minimized   noise level reduced     Problem: Aspiration (Enteral Nutrition)  Goal: Absence of Aspiration Signs and Symptoms  Outcome: Ongoing, Progressing  Intervention: Minimize Aspiration Risk  Recent Flowsheet Documentation  Taken 3/30/2024 0000 by Julia Gonzales RN  Head of Bed (HOB) Positioning: HOB elevated  Taken 3/29/2024 2000 by Julia Gonzales RN  Head of Bed (HOB) Positioning: HOB elevated     Problem: Device-Related Complication Risk  (Enteral Nutrition)  Goal: Safe, Effective Therapy Delivery  Outcome: Ongoing, Progressing     Problem: Feeding Intolerance (Enteral Nutrition)  Goal: Feeding Tolerance  Outcome: Ongoing, Progressing     Problem: Skin Injury Risk Increased  Goal: Skin Health and Integrity  Outcome: Ongoing, Progressing  Intervention: Optimize Skin Protection  Recent Flowsheet Documentation  Taken 3/30/2024 0000 by Julia Gonzales RN  Pressure Reduction Techniques: frequent weight shift encouraged  Head of Bed (HOB) Positioning: HOB elevated  Pressure Reduction Devices: pressure-redistributing mattress utilized  Skin Protection: adhesive use limited  Taken 3/29/2024 2000 by Julia Gonzalse RN  Head of Bed (HOB) Positioning: HOB elevated  Skin Protection: adhesive use limited   Goal Outcome Evaluation:

## 2024-03-30 NOTE — PLAN OF CARE
Problem: Adult Inpatient Plan of Care  Goal: Plan of Care Review  3/30/2024 0455 by Julia Gonzales RN  Outcome: Ongoing, Progressing  3/30/2024 0110 by Julia Gonzales RN  Outcome: Ongoing, Progressing  Goal: Patient-Specific Goal (Individualized)  3/30/2024 0455 by Julia Gonzales RN  Outcome: Ongoing, Progressing  3/30/2024 0110 by Julia Gonzales RN  Outcome: Ongoing, Progressing  Goal: Absence of Hospital-Acquired Illness or Injury  3/30/2024 0455 by Julia Gonzales RN  Outcome: Ongoing, Progressing  3/30/2024 0110 by Julia Gonzales RN  Outcome: Ongoing, Progressing  Intervention: Identify and Manage Fall Risk  Recent Flowsheet Documentation  Taken 3/30/2024 0400 by Julia Gonzales RN  Safety Promotion/Fall Prevention:   safety round/check completed   room organization consistent   fall prevention program maintained   clutter free environment maintained   assistive device/personal items within reach  Taken 3/30/2024 0000 by Julia Gonzales RN  Safety Promotion/Fall Prevention:   safety round/check completed   room organization consistent   fall prevention program maintained   clutter free environment maintained   assistive device/personal items within reach  Taken 3/29/2024 2200 by Julia Gonzales RN  Safety Promotion/Fall Prevention:   safety round/check completed   room organization consistent   fall prevention program maintained   clutter free environment maintained   assistive device/personal items within reach  Taken 3/29/2024 2000 by Julia Gonzales RN  Safety Promotion/Fall Prevention:   safety round/check completed   room organization consistent   fall prevention program maintained   clutter free environment maintained   assistive device/personal items within reach  Intervention: Prevent Skin Injury  Recent Flowsheet Documentation  Taken 3/30/2024 0400 by Julia Gonzales RN  Body Position: position changed independently  Skin Protection: adhesive use  limited  Taken 3/30/2024 0000 by Julia Gonzales RN  Body Position: position changed independently  Skin Protection: adhesive use limited  Taken 3/29/2024 2000 by Julia Gonzales RN  Body Position: position changed independently  Skin Protection: adhesive use limited  Intervention: Prevent and Manage VTE (Venous Thromboembolism) Risk  Recent Flowsheet Documentation  Taken 3/30/2024 0400 by Julia Gonzales RN  Activity Management: activity encouraged  Taken 3/30/2024 0000 by Julia Gonzales RN  Activity Management: activity encouraged  Taken 3/29/2024 2000 by Julia Gonzales RN  Activity Management: activity encouraged  Intervention: Prevent Infection  Recent Flowsheet Documentation  Taken 3/30/2024 0400 by Julia Gonzales RN  Infection Prevention:   single patient room provided   rest/sleep promoted   personal protective equipment utilized   hand hygiene promoted   equipment surfaces disinfected   environmental surveillance performed  Taken 3/30/2024 0000 by Julia Gonzales RN  Infection Prevention:   single patient room provided   rest/sleep promoted   personal protective equipment utilized   hand hygiene promoted   equipment surfaces disinfected   environmental surveillance performed  Taken 3/29/2024 2200 by Julia oGnzales RN  Infection Prevention:   single patient room provided   rest/sleep promoted   personal protective equipment utilized   hand hygiene promoted   equipment surfaces disinfected   environmental surveillance performed  Taken 3/29/2024 2000 by Julia Gonzales RN  Infection Prevention:   single patient room provided   rest/sleep promoted   personal protective equipment utilized   hand hygiene promoted   environmental surveillance performed   equipment surfaces disinfected  Goal: Optimal Comfort and Wellbeing  3/30/2024 0455 by Julia Gonzales RN  Outcome: Ongoing, Progressing  3/30/2024 0110 by Julia Gonzales RN  Outcome: Ongoing,  Progressing  Intervention: Provide Person-Centered Care  Recent Flowsheet Documentation  Taken 3/30/2024 0000 by Julia Gonzales RN  Trust Relationship/Rapport: care explained  Taken 3/29/2024 2000 by Julia Gonzales RN  Trust Relationship/Rapport: care explained  Goal: Readiness for Transition of Care  3/30/2024 0455 by Julia Gonzales RN  Outcome: Ongoing, Progressing  3/30/2024 0110 by Julia Gonzales RN  Outcome: Ongoing, Progressing     Problem: Swallowing Impairment  Goal: Optimal Eating/Swallowing without Aspir  3/30/2024 0455 by Julia Gonzales RN  Outcome: Ongoing, Progressing  3/30/2024 0110 by Julia Gonzales RN  Outcome: Ongoing, Progressing     Problem: Suicide Risk  Goal: Absence of Self-Harm  3/30/2024 0455 by Julia Gonzales RN  Outcome: Ongoing, Progressing  3/30/2024 0110 by Julia Gonzales RN  Outcome: Ongoing, Progressing  Intervention: Promote Psychosocial Wellbeing  Recent Flowsheet Documentation  Taken 3/29/2024 2000 by Julia Gonzales RN  Family/Support System Care:   self-care encouraged   support provided  Sleep/Rest Enhancement:   awakenings minimized   noise level reduced     Problem: Aspiration (Enteral Nutrition)  Goal: Absence of Aspiration Signs and Symptoms  3/30/2024 0455 by Julia Gonzales RN  Outcome: Ongoing, Progressing  3/30/2024 0110 by Julia Gonzales RN  Outcome: Ongoing, Progressing  Intervention: Minimize Aspiration Risk  Recent Flowsheet Documentation  Taken 3/30/2024 0400 by Julia Gonzales RN  Head of Bed (HOB) Positioning: HOB elevated  Taken 3/30/2024 0000 by Julia Gonzales RN  Head of Bed (HOB) Positioning: HOB elevated  Taken 3/29/2024 2000 by Julia Gonzales RN  Head of Bed (HOB) Positioning: HOB elevated     Problem: Device-Related Complication Risk (Enteral Nutrition)  Goal: Safe, Effective Therapy Delivery  3/30/2024 0455 by Julia Gonzales RN  Outcome: Ongoing, Progressing  3/30/2024 0110 by Christian  JUAN DAVID Dumont  Outcome: Ongoing, Progressing     Problem: Feeding Intolerance (Enteral Nutrition)  Goal: Feeding Tolerance  3/30/2024 0455 by Julia Gonzales RN  Outcome: Ongoing, Progressing  3/30/2024 0110 by Julia Gonzales RN  Outcome: Ongoing, Progressing     Problem: Skin Injury Risk Increased  Goal: Skin Health and Integrity  3/30/2024 0455 by Julia Gonzales RN  Outcome: Ongoing, Progressing  3/30/2024 0110 by Julia Gonzales RN  Outcome: Ongoing, Progressing  Intervention: Optimize Skin Protection  Recent Flowsheet Documentation  Taken 3/30/2024 0400 by Julia Gonzales RN  Pressure Reduction Techniques: frequent weight shift encouraged  Head of Bed (HOB) Positioning: [facility] elevated  Pressure Reduction Devices: pressure-redistributing mattress utilized  Skin Protection: adhesive use limited  Taken 3/30/2024 0000 by Julia Gonzales RN  Pressure Reduction Techniques: frequent weight shift encouraged  Head of Bed (HOB) Positioning: [facility] elevated  Pressure Reduction Devices: pressure-redistributing mattress utilized  Skin Protection: adhesive use limited  Taken 3/29/2024 2000 by Julia Gonzales RN  Head of Bed (HOB) Positioning: HOB elevated  Skin Protection: adhesive use limited   Goal Outcome Evaluation:

## 2024-03-30 NOTE — PROGRESS NOTES
"Nutrition Services    Patient Name: Hever Guallpa  YOB: 1935  MRN: [id number]  Admission date: 3/26/2024    PROGRESS NOTE      Encounter Information: Check on for tube feeding.  S/P PEG placement 3/29 per GI with plans to begin TF this AM per RN.  SLP recommended NPO 3/29.  PT/OT following.  D50W given overnight related to hypoglycemia.         PO Diet: NPO Diet NPO Type: Strict NPO   PO Supplements: None ordered    PO Intake:  NPO       Current nutrition support: Isosource 1.5 @ 30mL/hour + 30mL/hour water flush   Nutrition support review: To begin today        Labs (reviewed below): Hypokalemia - to replace today         GI Function:  Last documented BM 3/27 (x 3 days ago)       Nutrition Intervention Updates: Continue current EN prescription, will monitor for ability to advance to goal        Results from last 7 days   Lab Units 03/30/24  0038 03/29/24  0503 03/28/24  0439   SODIUM mmol/L 144 142 142   POTASSIUM mmol/L 3.4* 3.8 4.2   CHLORIDE mmol/L 106 107 105   CO2 mmol/L 27.0 24.0 27.0   BUN mg/dL 24* 23 18   CREATININE mg/dL 1.36* 1.46* 1.44*   CALCIUM mg/dL 9.0 9.0 9.3   GLUCOSE mg/dL 101* 78 95     Results from last 7 days   Lab Units 03/30/24  0038 03/29/24  0020 03/28/24  1550   HEMOGLOBIN g/dL 14.0   < >  --    HEMATOCRIT % 45.0   < >  --    TRIGLYCERIDES mg/dL  --   --  55    < > = values in this interval not displayed.     No results found for: \"COVID19\"  Lab Results   Component Value Date    HGBA1C 5.60 03/28/2024       RD to follow up per protocol.    Electronically signed by:  Rubina Lentz RD  03/30/24 07:43 EDT    "

## 2024-03-30 NOTE — PROGRESS NOTES
"[facility] MEDICINE SERVICE  DAILY PROGRESS NOTE    NAME: Hever Guallpa  : 1935  MRN: [id number]      LOS: 3 days     PROVIDER OF SERVICE: Michell Menchaca MD    Chief Complaint: Food impaction of esophagus    Subjective:     Interval History:  History taken from: patient    History of Present Illness:   Per the documentation by Shakir Ferreira MD , dated 2024,  \"This is an 88-year-old male who presents with acute food impaction. He states he was eating fried potatoes yesterday at 5 PM and that the food got stuck. He is unable to swallow secretions. He does feel a sense of relief this morning and wonders if the food bolus is passed but he is not sure. He reports a recent history of dysphagia and odynophagia. He has a history of reflux symptoms in the past but none recently. No unintentional weight loss. No blood thinners. Last EGD  hiatal hernia GE junction ring and UES stricture status post dilation 54 [place]. \"     3/28/24 seen in bed NAD, says he didn't sleep well last night, awaiting speech eval. DW RN    Review of Systems  was done and negative except as in the note   3/29  Patient status post EGD for food impaction  Patient still n.p.o.  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  If patient fails swallow evaluation, would recommend Dobbhoff tube placement with initiation of enteral feeds.  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  Will consult neurology.    3/30  Patient status post PEG tube placement yesterday  Getting CTA of the neck and brain  .Examination is somewhat limited due to motion " artifact.  2.No intracranial large vessel occlusion is identified.  3.There is a moderate stenosis of the petrous segment of the right ICA (series 5, image 556).  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  5.Fetal origin of the right posterior cerebral artery. There appears to be a moderate to severe focal stenosis of the right PCA on series 5, image 618.  6.No significant stenosis within the bilateral cervical ICAs.     Still having productive cough of greenish phlegm  Patient with acute bronchitis secondary to aspiration with no evidence of pneumonia on CT  Continue IV antibiotic with Unasyn  [id number]  Will add amlodipine    Objective:     Vital Signs  Temp:  [97.7 °F (36.5 °C)-98.3 °F (36.8 °C)] 97.9 °F (36.6 °C)  Heart Rate:  [66-86] 77  Resp:  [11-18] 15  BP: (110-196)/(48-97) 196/97  Flow (L/min):  [10] 10   Body mass index is 27.59 kg/m².      Physical Exam  CVS: S1/S2 present, RRR, no M/R/G  Lungs: Clear bilaterally.  No additional sounds  Abdomen: Soft nontender positive bowel sounds  Ext: No lower extremity edema  Skin: No rash or lesions  Neuro: no focal deficits  Psych: Mood is normal  Scheduled Meds   ampicillin-sulbactam, 3 g, Intravenous, Q6H  cyanocobalamin, 1,000 mcg, Intramuscular, Daily  guaiFENesin-codeine, 10 mL, Oral, TID  polyethylene glycol, 17 g, Oral, Daily  potassium chloride, 40 mEq, Oral, Q4H  sodium chloride, 10 mL, Intravenous, Q12H       PRN Meds     acetaminophen    senna-docusate sodium **AND** polyethylene glycol **AND** bisacodyl **AND** bisacodyl    Calcium Replacement - Follow Nurse / BPA Driven Protocol    dextrose    dextrose    glucagon (human recombinant)    Magnesium Standard Dose Replacement - Follow Nurse / BPA Driven Protocol    melatonin    ondansetron ODT **OR** ondansetron    Phosphorus Replacement - Follow Nurse / BPA Driven Protocol    Potassium Replacement - Follow Nurse / BPA Driven Protocol    [COMPLETED] Insert Peripheral IV  **AND** sodium chloride    sodium chloride    sodium chloride    sodium chloride   Infusions  dextrose 5 % and sodium chloride 0.45 %, 50 mL/hr, Last Rate: Stopped (03/30/24 1045)  lactated ringers, 75 mL/hr, Last Rate: 75 mL/hr (03/30/24 0603)  sodium chloride, 9 mL/hr          Diagnostic Data    Results from last 7 days   Lab Units 03/30/24  0038   WBC 10*3/mm3 10.32   HEMOGLOBIN g/dL 14.0   HEMATOCRIT % 45.0   PLATELETS 10*3/mm3 212   GLUCOSE mg/dL 101*   CREATININE mg/dL 1.36*   BUN mg/dL 24*   SODIUM mmol/L 144   POTASSIUM mmol/L 3.4*   ANION GAP mmol/L 11.0       CT Angiogram Head    Result Date: 3/30/2024  1.Examination is somewhat limited due to motion artifact. 2.No intracranial large vessel occlusion is identified. 3.There is a moderate stenosis of the petrous segment of the right ICA (series 5, image 556). 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 5.Fetal origin of the right posterior cerebral artery. There appears to be a moderate to severe focal stenosis of the right PCA on series 5, image 618. 6.No significant stenosis within the bilateral cervical ICAs. Electronically Signed: Janes Luna MD  3/30/2024 10:36 AM EDT  Workstation ID: [id number]    CT Angiogram Carotids    Result Date: 3/30/2024  1.Examination is somewhat limited due to motion artifact. 2.No intracranial large vessel occlusion is identified. 3.There is a moderate stenosis of the petrous segment of the right ICA (series 5, image 556). 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 5.Fetal origin of the right posterior cerebral artery. There appears to be a moderate to severe focal stenosis of the right PCA on series 5, image 618. 6.No significant stenosis within the bilateral cervical ICAs. Electronically Signed: Janes Luna MD  3/30/2024 10:36 AM EDT  Workstation ID: [id number]    MRI Cervical Spine With & Without Contrast    Result Date: 3/28/2024  Impression: 1. No  findings to suggest cervical cord infarct 2. 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 Age-related illusional changes. Moderate changes of chronic microvessel ischemia. Findings relayed to JUAN DAVID Friedman on March 28, 2024 at 5:01 p.m. by telephone. Electronically Signed: Aiden Saleem MD  3/28/2024 5:01 PM EDT  Workstation ID: [id number]    XR Orbits 4+ View    Result Date: 3/28/2024  Impression: No unexpected retained radiopaque foreign body is seen within the orbital or periorbital soft tissues. Electronically Signed: Venice Pinto MD  3/28/2024 3:09 PM EDT  Workstation ID: [id number]       I reviewed the patient's new clinical results.    Assessment/Plan:     Active and Resolved Problems  Active Hospital Problems    Diagnosis  POA    **Food impaction of esophagus [T18.128A, W44.F3XA]  Yes    Dysphagia [R13.10]  Yes      Resolved Hospital Problems   No resolved problems to display.     Food impaction of the esophagus  The patient was evaluated by EGD by GI on 3/27/2023  Impression:  Food impaction status post spontaneous passage  History of dysphagia and odynophagia status post 52 D[address] Zendejas dilation  Gastric erosion  The patient was later reporting coughing and stridor with sips of water status post EGD.  The patient is having difficulty with swallowing in the neck area.  CT of the neck and chest were ordered with no acute findings in the neck and 4 mm left upper lobe pulmonary nodule  1. Minimal secretions within the posterior aspect of the trachea. No evidence of bronchiolitis or evidence of aspiration pneumonia.  2. Small 4 mm pulmonary nodule within the posterior aspect of the left upper lobe. Recommend follow-up chest CT in 1 year if patient has risk factors such as smoking history.     The  patient was evaluated by speech therapy and recommended n.p.o. at this time and they will continue to follow and reevaluate the patient swallow   speech to evaluate      Major depression  The patient was evaluated by psychiatry and started on sertraline 25 p.o. daily    DVT prophylaxis:  Mechanical DVT prophylaxis orders are present.         Code status is   Code Status and Medical Interventions:   Ordered at: 03/26/24 9536     Level Of Support Discussed With:    Patient     Code Status (Patient has no pulse and is not breathing):    CPR (Attempt to Resuscitate)     Medical Interventions (Patient has pulse or is breathing):    Full Support       Plan for disposition:home in 1 days    Time: 30 minutes    Signature: Electronically signed by Michell Menchaca MD, 03/30/24, 11:32 EDT.  [facility] Hospitalist Team

## 2024-03-31 ENCOUNTER — APPOINTMENT (OUTPATIENT)
Dept: GENERAL RADIOLOGY | Facility: HOSPITAL | Age: 89
DRG: 393 | End: 2024-03-31
Payer: MEDICARE

## 2024-03-31 VITALS
SYSTOLIC BLOOD PRESSURE: 147 MMHG | HEART RATE: 79 BPM | RESPIRATION RATE: 16 BRPM | WEIGHT: 182.32 LBS | TEMPERATURE: 97.7 F | DIASTOLIC BLOOD PRESSURE: 67 MMHG | HEIGHT: 68 IN | BODY MASS INDEX: 27.63 KG/M2 | OXYGEN SATURATION: 97 %

## 2024-03-31 LAB
ALBUMIN SERPL-MCNC: 3.5 G/DL (ref 3.5–5.2)
ALBUMIN/GLOB SERPL: 1.3 G/DL
ALP SERPL-CCNC: 62 U/L (ref 39–117)
ALT SERPL W P-5'-P-CCNC: 24 U/L (ref 1–41)
ANION GAP SERPL CALCULATED.3IONS-SCNC: 12 MMOL/L (ref 5–15)
AST SERPL-CCNC: 31 U/L (ref 1–40)
BASOPHILS # BLD AUTO: 0.06 10*3/MM3 (ref 0–0.2)
BASOPHILS NFR BLD AUTO: 0.8 % (ref 0–1.5)
BILIRUB SERPL-MCNC: 0.6 MG/DL (ref 0–1.2)
BUN SERPL-MCNC: 15 MG/DL (ref 8–23)
BUN/CREAT SERPL: 11.8 (ref 7–25)
CALCIUM SPEC-SCNC: 9.3 MG/DL (ref 8.6–10.5)
CHLORIDE SERPL-SCNC: 105 MMOL/L (ref 98–107)
CO2 SERPL-SCNC: 24 MMOL/L (ref 22–29)
CREAT SERPL-MCNC: 1.27 MG/DL (ref 0.76–1.27)
DEPRECATED RDW RBC AUTO: 39 FL (ref 37–54)
EGFRCR SERPLBLD CKD-EPI 2021: 54.3 ML/MIN/1.73
EOSINOPHIL # BLD AUTO: 0.29 10*3/MM3 (ref 0–0.4)
EOSINOPHIL NFR BLD AUTO: 3.7 % (ref 0.3–6.2)
ERYTHROCYTE [DISTWIDTH] IN BLOOD BY AUTOMATED COUNT: 12.2 % (ref 12.3–15.4)
GLOBULIN UR ELPH-MCNC: 2.7 GM/DL
GLUCOSE BLDC GLUCOMTR-MCNC: 100 MG/DL (ref 70–105)
GLUCOSE BLDC GLUCOMTR-MCNC: 112 MG/DL (ref 70–105)
GLUCOSE BLDC GLUCOMTR-MCNC: 95 MG/DL (ref 70–105)
GLUCOSE SERPL-MCNC: 98 MG/DL (ref 65–99)
HCT VFR BLD AUTO: 43.1 % (ref 37.5–51)
HGB BLD-MCNC: 13.7 G/DL (ref 13–17.7)
IMM GRANULOCYTES # BLD AUTO: 0.05 10*3/MM3 (ref 0–0.05)
IMM GRANULOCYTES NFR BLD AUTO: 0.6 % (ref 0–0.5)
LYMPHOCYTES # BLD AUTO: 0.64 10*3/MM3 (ref 0.7–3.1)
LYMPHOCYTES NFR BLD AUTO: 8.2 % (ref 19.6–45.3)
MAGNESIUM SERPL-MCNC: 2.1 MG/DL (ref 1.6–2.4)
MCH RBC QN AUTO: 27.5 PG (ref 26.6–33)
MCHC RBC AUTO-ENTMCNC: 31.8 G/DL (ref 31.5–35.7)
MCV RBC AUTO: 86.5 FL (ref 79–97)
MONOCYTES # BLD AUTO: 0.58 10*3/MM3 (ref 0.1–0.9)
MONOCYTES NFR BLD AUTO: 7.4 % (ref 5–12)
NEUTROPHILS NFR BLD AUTO: 6.17 10*3/MM3 (ref 1.7–7)
NEUTROPHILS NFR BLD AUTO: 79.3 % (ref 42.7–76)
NRBC BLD AUTO-RTO: 0 /100 WBC (ref 0–0.2)
PHOSPHATE SERPL-MCNC: 2.8 MG/DL (ref 2.5–4.5)
PLATELET # BLD AUTO: 222 10*3/MM3 (ref 140–450)
PMV BLD AUTO: 9.7 FL (ref 6–12)
POTASSIUM SERPL-SCNC: 4 MMOL/L (ref 3.5–5.2)
PROT SERPL-MCNC: 6.2 G/DL (ref 6–8.5)
RBC # BLD AUTO: 4.98 10*6/MM3 (ref 4.14–5.8)
SODIUM SERPL-SCNC: 141 MMOL/L (ref 136–145)
WBC NRBC COR # BLD AUTO: 7.79 10*3/MM3 (ref 3.4–10.8)

## 2024-03-31 PROCEDURE — 83735 ASSAY OF MAGNESIUM: CPT

## 2024-03-31 PROCEDURE — 84100 ASSAY OF PHOSPHORUS: CPT

## 2024-03-31 PROCEDURE — 80053 COMPREHEN METABOLIC PANEL: CPT | Performed by: INTERNAL MEDICINE

## 2024-03-31 PROCEDURE — 82948 REAGENT STRIP/BLOOD GLUCOSE: CPT

## 2024-03-31 PROCEDURE — 25010000002 CYANOCOBALAMIN PER 1000 MCG: Performed by: NURSE PRACTITIONER

## 2024-03-31 PROCEDURE — 25010000002 AMPICILLIN-SULBACTAM PER 1.5 G: Performed by: INTERNAL MEDICINE

## 2024-03-31 PROCEDURE — 85025 COMPLETE CBC W/AUTO DIFF WBC: CPT | Performed by: INTERNAL MEDICINE

## 2024-03-31 PROCEDURE — 74018 RADEX ABDOMEN 1 VIEW: CPT

## 2024-03-31 RX ORDER — ASPIRIN 325 MG
325 TABLET, DELAYED RELEASE (ENTERIC COATED) ORAL DAILY
Status: SHIPPED | OUTPATIENT
Start: 2024-03-31

## 2024-03-31 RX ORDER — AMOXICILLIN AND CLAVULANATE POTASSIUM 875; 125 MG/1; MG/1
1 TABLET, FILM COATED ORAL 2 TIMES DAILY
Qty: 10 TABLET | Refills: 0 | Status: SHIPPED | OUTPATIENT
Start: 2024-03-31

## 2024-03-31 RX ORDER — ATORVASTATIN CALCIUM 40 MG/1
40 TABLET, FILM COATED ORAL DAILY
Qty: 30 TABLET | Refills: 0 | Status: SHIPPED | OUTPATIENT
Start: 2024-03-31

## 2024-03-31 RX ORDER — ASPIRIN/CALCIUM/MAG/ALUMINUM 325 MG
325 TABLET ORAL DAILY
Qty: 30 EACH | Refills: 0 | Status: SHIPPED | OUTPATIENT
Start: 2024-03-31 | End: 2024-03-31 | Stop reason: HOSPADM

## 2024-03-31 RX ORDER — AMLODIPINE BESYLATE 5 MG/1
5 TABLET ORAL
Qty: 30 TABLET | Refills: 0 | Status: SHIPPED | OUTPATIENT
Start: 2024-03-31

## 2024-03-31 RX ADMIN — Medication 10 ML: at 09:27

## 2024-03-31 RX ADMIN — AMPICILLIN SODIUM AND SULBACTAM SODIUM 3 G: 2; 1 INJECTION, POWDER, FOR SOLUTION INTRAMUSCULAR; INTRAVENOUS at 11:39

## 2024-03-31 RX ADMIN — AMPICILLIN SODIUM AND SULBACTAM SODIUM 3 G: 2; 1 INJECTION, POWDER, FOR SOLUTION INTRAMUSCULAR; INTRAVENOUS at 05:35

## 2024-03-31 RX ADMIN — AMLODIPINE BESYLATE 5 MG: 5 TABLET ORAL at 09:27

## 2024-03-31 RX ADMIN — POLYETHYLENE GLYCOL 3350 17 G: 17 POWDER, FOR SOLUTION ORAL at 09:26

## 2024-03-31 RX ADMIN — CYANOCOBALAMIN 1000 MCG: 1000 INJECTION, SOLUTION INTRAMUSCULAR; SUBCUTANEOUS at 09:27

## 2024-03-31 RX ADMIN — GUAIFENESIN AND CODEINE PHOSPHATE 10 ML: 100; 10 SOLUTION ORAL at 09:40

## 2024-03-31 NOTE — PLAN OF CARE
Problem: Adult Inpatient Plan of Care  Goal: Plan of Care Review  3/31/2024 0609 by Julia Gonzales RN  Outcome: Ongoing, Progressing  3/31/2024 0119 by Julia Gonzales RN  Outcome: Ongoing, Progressing  Flowsheets (Taken 3/31/2024 0119)  Progress: improving  Plan of Care Reviewed With: patient  Goal: Patient-Specific Goal (Individualized)  3/31/2024 0609 by Julia Gonzales RN  Outcome: Ongoing, Progressing  3/31/2024 0119 by Julia Gonzales RN  Outcome: Ongoing, Progressing  Goal: Absence of Hospital-Acquired Illness or Injury  3/31/2024 0609 by Julia Gonzales RN  Outcome: Ongoing, Progressing  3/31/2024 0119 by Julia Gonzales RN  Outcome: Ongoing, Progressing  Intervention: Identify and Manage Fall Risk  Recent Flowsheet Documentation  Taken 3/31/2024 0000 by Julia Gonzales RN  Safety Promotion/Fall Prevention:   safety round/check completed   room organization consistent   fall prevention program maintained   clutter free environment maintained   assistive device/personal items within reach  Taken 3/30/2024 2200 by Julia Gonzales RN  Safety Promotion/Fall Prevention:   safety round/check completed   room organization consistent   fall prevention program maintained   assistive device/personal items within reach   clutter free environment maintained  Taken 3/30/2024 2000 by Julia Gonzales RN  Safety Promotion/Fall Prevention:   safety round/check completed   room organization consistent   fall prevention program maintained   clutter free environment maintained   assistive device/personal items within reach  Intervention: Prevent Skin Injury  Recent Flowsheet Documentation  Taken 3/31/2024 0000 by Julia Gonzales RN  Body Position: position changed independently  Taken 3/30/2024 2200 by Julia Gonzales RN  Body Position: position changed independently  Taken 3/30/2024 2000 by Julia Gonzales RN  Body Position: position changed independently  Skin Protection:  adhesive use limited  Intervention: Prevent and Manage VTE (Venous Thromboembolism) Risk  Recent Flowsheet Documentation  Taken 3/31/2024 0000 by Julia Gonzales RN  Activity Management: activity encouraged  Range of Motion: active ROM (range of motion) encouraged  Taken 3/30/2024 2000 by Julia Gonzales RN  Activity Management: activity encouraged  VTE Prevention/Management:   bilateral   sequential compression devices off  Intervention: Prevent Infection  Recent Flowsheet Documentation  Taken 3/31/2024 0000 by Julia Gonzales RN  Infection Prevention:   single patient room provided   rest/sleep promoted   personal protective equipment utilized   hand hygiene promoted   equipment surfaces disinfected   environmental surveillance performed  Taken 3/30/2024 2200 by Julia Gonzales RN  Infection Prevention:   single patient room provided   rest/sleep promoted   personal protective equipment utilized   hand hygiene promoted   equipment surfaces disinfected   environmental surveillance performed  Taken 3/30/2024 2000 by Julia Gonzales RN  Infection Prevention:   single patient room provided   rest/sleep promoted   personal protective equipment utilized   hand hygiene promoted   equipment surfaces disinfected   environmental surveillance performed  Goal: Optimal Comfort and Wellbeing  3/31/2024 0609 by Julia Gonzales RN  Outcome: Ongoing, Progressing  3/31/2024 0119 by Julia Gonzales RN  Outcome: Ongoing, Progressing  Intervention: Provide Person-Centered Care  Recent Flowsheet Documentation  Taken 3/31/2024 0000 by Julia Gonzales RN  Trust Relationship/Rapport: care explained  Taken 3/30/2024 2000 by Julia Gonzales RN  Trust Relationship/Rapport: care explained  Goal: Readiness for Transition of Care  3/31/2024 0609 by Julia Gonzales RN  Outcome: Ongoing, Progressing  3/31/2024 0119 by Julia Gonzales RN  Outcome: Ongoing, Progressing     Problem: Swallowing  Impairment  Goal: Optimal Eating/Swallowing without Aspir  3/31/2024 0609 by Julia Gonzales RN  Outcome: Ongoing, Progressing  3/31/2024 0119 by Julia Gonzales RN  Outcome: Ongoing, Progressing     Problem: Suicide Risk  Goal: Absence of Self-Harm  3/31/2024 0609 by Julia Gonzales RN  Outcome: Ongoing, Progressing  3/31/2024 0119 by Julia Gonzales RN  Outcome: Ongoing, Progressing  Intervention: Promote Psychosocial Wellbeing  Recent Flowsheet Documentation  Taken 3/31/2024 0000 by Julia Gonzales RN  Supportive Measures: active listening utilized  Family/Support System Care: self-care encouraged  Taken 3/30/2024 2000 by Julia Gonzales RN  Family/Support System Care:   support provided   self-care encouraged     Problem: Aspiration (Enteral Nutrition)  Goal: Absence of Aspiration Signs and Symptoms  3/31/2024 0609 by Julia Gonzales RN  Outcome: Ongoing, Progressing  3/31/2024 0119 by Julia Gonzales RN  Outcome: Ongoing, Progressing  Intervention: Minimize Aspiration Risk  Recent Flowsheet Documentation  Taken 3/31/2024 0000 by Julia Gonzales RN  Head of Bed (HOB) Positioning: HOB elevated  Taken 3/30/2024 2200 by Julia Gonzales RN  Head of Bed (HOB) Positioning: HOB elevated  Taken 3/30/2024 2000 by Julia Gonzales RN  Head of Bed (HOB) Positioning: HOB elevated     Problem: Device-Related Complication Risk (Enteral Nutrition)  Goal: Safe, Effective Therapy Delivery  3/31/2024 0609 by Julia Gonzales RN  Outcome: Ongoing, Progressing  3/31/2024 0119 by Julia Gonzales RN  Outcome: Ongoing, Progressing     Problem: Feeding Intolerance (Enteral Nutrition)  Goal: Feeding Tolerance  3/31/2024 0609 by Julia Gonzales RN  Outcome: Ongoing, Progressing  3/31/2024 0119 by Julia Gonzales RN  Outcome: Ongoing, Progressing     Problem: Skin Injury Risk Increased  Goal: Skin Health and Integrity  3/31/2024 0609 by Ilesanmi, Julia, RN  Outcome: Ongoing,  Progressing  3/31/2024 0119 by Julia Gonzales RN  Outcome: Ongoing, Progressing  Intervention: Optimize Skin Protection  Recent Flowsheet Documentation  Taken 3/31/2024 0000 by Julia Gonzales RN  Head of Bed (HOB) Positioning: HOB elevated  Taken 3/30/2024 2200 by Julia Gonzales RN  Head of Bed (HOB) Positioning: HOB elevated  Taken 3/30/2024 2000 by Julia Gonzales RN  Pressure Reduction Techniques: frequent weight shift encouraged  Head of Bed (HOB) Positioning: HOB elevated  Pressure Reduction Devices: pressure-redistributing mattress utilized  Skin Protection: adhesive use limited   Goal Outcome Evaluation:  Plan of Care Reviewed With: patient        Progress: improving

## 2024-03-31 NOTE — CASE MANAGEMENT/SOCIAL WORK
Case Management Discharge Note      Final Note: [facility]         Selected Continued Care - Discharged on 3/31/2024 Admission date: 3/26/2024 - Discharge disposition: Skilled Nursing Facility ([place] - External)      Destination Coordination complete.      Service Provider Selected Services Address Phone Fax Patient Preferred    [facility] [address] [place] [address] [phone] --                     Transportation Services  W/C Van: Bhavna Sheehan    Final Discharge Disposition Code: 62 - inpatient rehab facility

## 2024-03-31 NOTE — CASE MANAGEMENT/SOCIAL WORK
Continued Stay Note  [facility]     Patient Name: Hever Guallpa  MRN: [id number]  Today's Date: 3/31/2024    Admit Date: 3/26/2024    Plan: [facility]. Bed avail 3/31 after 3pm if KUB is neg for obstruction.  [facility] W/C van   Discharge Plan       Row Name 03/31/24 1016       Plan    Plan [facility]. Bed avail 3/31 after 3pm if KUB is neg for obstruction.  [facility] W/C van    Plan Comments Pt tolerating tube feeds.   Md stated patient was ready for d/c.  Verified with [facility] Liasion that bed is ready after 3 pm.  No BM charted since 3/26.  Nursing to order stat KUB to rule out obstruction.  If KUB is negative, pt may transfer to [facility].  Nursing notified CM of need for wheelchair van.  Request entered and notified bed control.                      Expected Discharge Date and Time       Expected Discharge Date Expected Discharge Time    Mar 31, 2024               Tamie Loera RN

## 2024-03-31 NOTE — DISCHARGE SUMMARY
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 on MRI of the brain done on  with echo showing normal ejection fraction and mitral regurg mild  Patient was seen per GI and pulmonary and neurology  Date of Discharge:  3/31  Primary Care Physician: Maddie Martinez MD      Presenting Problem:   Food impaction of esophagus [T18.128A, W44.F3XA]  Food impaction of esophagus, initial encounter [T18.128A, W44.F3XA]  Dysphagia [R13.10]    Active and Resolved Hospital Problems:  Active Hospital Problems    Diagnosis POA    **Food impaction of esophagus [T18.128A, W44.F3XA] Yes    Dysphagia [R13.10] Yes      Resolved Hospital Problems   No resolved problems to display.         Hospital Course     Hospital Course:  Hever Guallpa is a 88 y.o. male patient admitted with dysphagia  His CAT scan showed medulla infarct in the left lateral medulla and that is confirmed on MRI done on   Patient status post PEG tube placement  Patient had bronchitis seen per pulmonary  Patient had incidental 4 mm left upper lobe nodule  Patient is going to need repeat CAT scan of the chest to reevaluate the nodule in 4 months  Patient improved clinically he will be discharged to rehab on cp in 3 d        DISCHARGE Follow Up Recommendations for labs and diagnostics:       Reasons For Change In Medications and Indications for New Medications:      Day of Discharge     Vital Signs:  Temp:  [97.6 °F (36.4 °C)-98.8 °F (37.1  °C)] 97.8 °F (36.6 °C)  Heart Rate:  [68-84] 84  Resp:  [12-19] 17  BP: (138-196)/(76-97) 138/82    Physical Exam:  Physical Exam awake alert oriented x 3  HEENT exam is normal  Neck supple  Chest with scattered rhonchi  Heart S1-S2 no murmur  Abdomen soft benign nontender bowel sounds positive  Extremities no edema        Pertinent  and/or Most Recent Results     LAB RESULTS:      Lab 03/31/24  0220 03/30/24  0038 03/29/24  1142 03/29/24  0020 03/28/24  0439 03/27/24  0425   WBC 7.79 10.32  --  12.74* 14.03* 8.61   HEMOGLOBIN 13.7 14.0  --  14.4 15.7 15.2   HEMATOCRIT 43.1 45.0  --  44.4 50.1 48.3   PLATELETS 222 212  --  239 234 213   NEUTROS ABS 6.17 8.64*  --  11.16* 12.57* 7.17*   IMMATURE GRANS (ABS) 0.05 0.06*  --  0.04 0.05 0.05   LYMPHS ABS 0.64* 0.66*  --  0.76 0.45* 0.63*   MONOS ABS 0.58 0.75  --  0.71 0.92* 0.55   EOS ABS 0.29 0.15  --  0.01 0.00 0.15   MCV 86.5 88.9  --  86.7 88.0 86.6   PROTIME  --   --  11.6  --   --   --          Lab 03/31/24  0220 03/30/24  1228 03/30/24  0038 03/29/24  0503 03/28/24  1550 03/28/24  0439   SODIUM 141 140 144 142  --  142   POTASSIUM 4.0 4.1  4.1 3.4* 3.8  --  4.2   CHLORIDE 105 106 106 107  --  105   CO2 24.0 23.0 27.0 24.0  --  27.0   ANION GAP 12.0 11.0 11.0 11.0  --  10.0   BUN 15 18 24* 23  --  18   CREATININE 1.27 1.23 1.36* 1.46*  --  1.44*   EGFR 54.3* 56.5* 50.1* 46.0*  --  46.7*   GLUCOSE 98 102* 101* 78  --  95   CALCIUM 9.3 9.2 9.0 9.0  --  9.3   HEMOGLOBIN A1C  --   --   --   --  5.60  --    TSH  --   --   --   --  2.770  --          Lab 03/31/24  0220 03/30/24  1228   TOTAL PROTEIN 6.2 6.2   ALBUMIN 3.5 3.5   GLOBULIN 2.7 2.7   ALT (SGPT) 24 23   AST (SGOT) 31 36   BILIRUBIN 0.6 0.7   ALK PHOS 62 62         Lab 03/29/24  1142   PROTIME 11.6   INR 1.07         Lab 03/28/24  1550   CHOLESTEROL 178   LDL CHOL 116*   HDL CHOL 51   TRIGLYCERIDES 55         Lab 03/28/24  1550   FOLATE 11.70   VITAMIN B 12 172*         Brief Urine Lab Results       None           Microbiology Results (last 10 days)       ** No results found for the last 240 hours. **            CT Angiogram Head    Result Date: 3/30/2024  Impression: 1.Examination is somewhat limited due to motion artifact. 2.No intracranial large vessel occlusion is identified. 3.There is a moderate stenosis of the petrous segment of the right ICA (series 5, image 556). 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 5.Fetal origin of the right posterior cerebral artery. There appears to be a moderate to severe focal stenosis of the right PCA on series 5, image 618. 6.No significant stenosis within the bilateral cervical ICAs. Electronically Signed: Janes Luna MD  3/30/2024 10:36 AM EDT  Workstation ID: [id number]    CT Angiogram Carotids    Result Date: 3/30/2024  Impression: 1.Examination is somewhat limited due to motion artifact. 2.No intracranial large vessel occlusion is identified. 3.There is a moderate stenosis of the petrous segment of the right ICA (series 5, image 556). 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 5.Fetal origin of the right posterior cerebral artery. There appears to be a moderate to severe focal stenosis of the right PCA on series 5, image 618. 6.No significant stenosis within the bilateral cervical ICAs. Electronically Signed: Janes Luna MD  3/30/2024 10:36 AM EDT  Workstation ID: [id number]    MRI Cervical Spine With & Without Contrast    Result Date: 3/28/2024  Impression: Impression: 1. No findings to suggest cervical cord infarct 2. Cervical degenerative disease Electronically Signed: Frederic Stark  3/28/2024 5:31 PM EDT  Workstation ID: [id number]    MRI Brain Without Contrast    Result Date: 3/28/2024  Impression: Impression: Acute nonhemorrhagic infarct in the left lateral medulla measuring 0.8 x 0.9 cm. Age-related illusional changes. Moderate changes of chronic microvessel ischemia. Findings relayed to JUAN DAVID Friedman on March  28, 2024 at 5:01 p.m. by telephone. Electronically Signed: Aiden Saleem MD  3/28/2024 5:01 PM EDT  Workstation ID: [id number]    XR Orbits 4+ View    Result Date: 3/28/2024  Impression: Impression: No unexpected retained radiopaque foreign body is seen within the orbital or periorbital soft tissues. Electronically Signed: Venice Pinto MD  3/28/2024 3:09 PM EDT  Workstation ID: [id number]    CT Chest Without Contrast Diagnostic    Result Date: 3/27/2024  Impression: Impression: 1. Minimal secretions within the posterior aspect of the trachea. No evidence of bronchiolitis or evidence of aspiration pneumonia. 2. Small 4 mm pulmonary nodule within the posterior aspect of the left upper lobe. Recommend follow-up chest CT in 1 year if patient has risk factors such as smoking history. Electronically Signed: Yohan Alcala  3/27/2024 1:36 PM EDT  Workstation ID: [id number]    CT Soft Tissue Neck Without Contrast    Result Date: 3/27/2024  Impression: Impression: 1. No acute findings in the neck. 2. 4 mm left upper lobe pulmonary nodule Electronically Signed: Ant Aguilar MD  3/27/2024 1:26 PM EDT  Workstation ID: [id number]             Results for orders placed during the hospital encounter of 03/26/24    Adult Transthoracic Echo Complete W/ Cont if Necessary Per Protocol    Interpretation Summary    Left ventricular systolic function is normal. Calculated left ventricular EF = 62.2% Left ventricular ejection fraction appears to be 61 - 65%.    Left ventricular diastolic function is consistent with (grade I) impaired relaxation. GLS -17.5%.    Estimated right ventricular systolic pressure from tricuspid regurgitation is normal (<35 mmHg).    Mild mitral valve regurgitation was noted.      Labs Pending at Discharge:      Procedures Performed  Procedure(s):  ESOPHAGOGASTRODUODENOSCOPY WITH PERCUTANEOUS ENDOSCOPIC GASTROSTOMY TUBE INSERTION  03/29 1714 Upper GI Endoscopy      Consults:   Consults       Date and Time Order Name  Status Description    3/29/2024 10:18 AM Inpatient Neurology Consult Stroke      3/28/2024 11:55 AM Inpatient Neurology Consult General Completed     3/28/2024  8:13 AM Inpatient Pulmonology Consult Completed     3/27/2024  4:28 PM Inpatient Hospitalist Consult Completed     3/26/2024 11:44 PM Inpatient Psychiatrist Consult Completed     3/26/2024 10:30 PM Inpatient Gastroenterology Consult Completed     3/26/2024 10:13 PM Gastroenterology (on-call MD unless specified)                Discharge Details        Discharge Medications        New Medications        Instructions Start Date   amLODIPine 5 MG tablet  Commonly known as: NORVASC   5 mg, Oral, Every 24 Hours Scheduled      amoxicillin-clavulanate 875-125 MG per tablet  Commonly known as: AUGMENTIN   1 tablet, Oral, 2 Times Daily               No Known Allergies      Discharge Disposition: [facility]  Skilled Nursing Facility ([place] - External)    Diet:  Hospital:  Diet Order   Procedures    NPO Diet NPO Type: Strict NPO         Discharge Activity:         CODE STATUS:  Code Status and Medical Interventions:   Ordered at: 03/26/24 2228     Level Of Support Discussed With:    Patient     Code Status (Patient has no pulse and is not breathing):    CPR (Attempt to Resuscitate)     Medical Interventions (Patient has pulse or is breathing):    Full Support         No future appointments.        Time spent on Discharge including face to face service:  35 minutes        Signature: Electronically signed by Michell Menchaca MD, 03/31/24, 09:06 EDT.  [facility] Hospitalist Team

## 2024-03-31 NOTE — PLAN OF CARE
Goal Outcome Evaluation:              Problem: Adult Inpatient Plan of Care  Goal: Plan of Care Review  3/31/2024 1232 by Danya Foster RN  Outcome: Ongoing, Progressing  3/31/2024 0943 by Danya Foster RN  Outcome: Ongoing, Progressing  Goal: Patient-Specific Goal (Individualized)  3/31/2024 1232 by Danya Foster RN  Outcome: Ongoing, Progressing  3/31/2024 0943 by Danya Foster RN  Outcome: Ongoing, Progressing  Goal: Absence of Hospital-Acquired Illness or Injury  3/31/2024 1232 by Danya Foster RN  Outcome: Ongoing, Progressing  3/31/2024 0943 by Danya Foster RN  Outcome: Ongoing, Progressing  Intervention: Identify and Manage Fall Risk  Recent Flowsheet Documentation  Taken 3/31/2024 1000 by Danya Foster RN  Safety Promotion/Fall Prevention:   activity supervised   assistive device/personal items within reach   clutter free environment maintained   fall prevention program maintained   nonskid shoes/slippers when out of bed   room organization consistent   safety round/check completed  Taken 3/31/2024 0944 by Danya Foster RN  Safety Promotion/Fall Prevention:   activity supervised   assistive device/personal items within reach   clutter free environment maintained   fall prevention program maintained   nonskid shoes/slippers when out of bed   room organization consistent   safety round/check completed  Taken 3/31/2024 0800 by Danya Foster RN  Safety Promotion/Fall Prevention:   activity supervised   assistive device/personal items within reach   clutter free environment maintained   fall prevention program maintained   nonskid shoes/slippers when out of bed   room organization consistent   safety round/check completed  Intervention: Prevent Skin Injury  Recent Flowsheet Documentation  Taken 3/31/2024 1200 by Danya Foster RN  Skin Protection:   adhesive use limited   incontinence pads utilized   transparent dressing maintained   tubing/devices free from skin  contact  Taken 3/31/2024 0800 by Danya Foster RN  Body Position: position changed independently  Skin Protection:   adhesive use limited   incontinence pads utilized   transparent dressing maintained   tubing/devices free from skin contact  Intervention: Prevent and Manage VTE (Venous Thromboembolism) Risk  Recent Flowsheet Documentation  Taken 3/31/2024 0800 by Danya Foster RN  Activity Management: up in chair  Intervention: Prevent Infection  Recent Flowsheet Documentation  Taken 3/31/2024 1000 by Danya Foster RN  Infection Prevention:   environmental surveillance performed   hand hygiene promoted   rest/sleep promoted   single patient room provided  Taken 3/31/2024 0944 by Danya Foster RN  Infection Prevention:   environmental surveillance performed   hand hygiene promoted   rest/sleep promoted   single patient room provided  Taken 3/31/2024 0800 by Danya Foster RN  Infection Prevention:   environmental surveillance performed   hand hygiene promoted   rest/sleep promoted   single patient room provided  Goal: Optimal Comfort and Wellbeing  3/31/2024 1232 by Danya Foster RN  Outcome: Ongoing, Progressing  3/31/2024 0943 by Danya Foster RN  Outcome: Ongoing, Progressing  Intervention: Provide Person-Centered Care  Recent Flowsheet Documentation  Taken 3/31/2024 1200 by Danya Foster RN  Trust Relationship/Rapport:   care explained   choices provided   emotional support provided   empathic listening provided   questions answered   thoughts/feelings acknowledged   reassurance provided   questions encouraged  Taken 3/31/2024 0800 by Danya Foster RN  Trust Relationship/Rapport:   care explained   questions answered   reassurance provided   empathic listening provided   choices provided   emotional support provided   questions encouraged   thoughts/feelings acknowledged  Goal: Readiness for Transition of Care  3/31/2024 1232 by Danya Foster RN  Outcome: Ongoing,  Progressing  3/31/2024 0943 by Danya Foster RN  Outcome: Ongoing, Progressing     Problem: Swallowing Impairment  Goal: Optimal Eating/Swallowing without Aspir  3/31/2024 1232 by Danya Foster RN  Outcome: Ongoing, Progressing  3/31/2024 0943 by Danya Foster RN  Outcome: Ongoing, Progressing     Problem: Suicide Risk  Goal: Absence of Self-Harm  3/31/2024 1232 by Danya Foster RN  Outcome: Ongoing, Progressing  3/31/2024 0943 by Danya Foster RN  Outcome: Ongoing, Progressing  Intervention: Promote Psychosocial Wellbeing  Recent Flowsheet Documentation  Taken 3/31/2024 1200 by Danya Fostre RN  Family/Support System Care:   self-care encouraged   support provided  Taken 3/31/2024 0800 by Danya Foster RN  Family/Support System Care:   self-care encouraged   support provided  Sleep/Rest Enhancement: room darkened     Problem: Aspiration (Enteral Nutrition)  Goal: Absence of Aspiration Signs and Symptoms  3/31/2024 1232 by Danya Foster RN  Outcome: Ongoing, Progressing  3/31/2024 0943 by Danya Foster RN  Outcome: Ongoing, Progressing  Intervention: Minimize Aspiration Risk  Recent Flowsheet Documentation  Taken 3/31/2024 0800 by Danya Foster RN  Head of Bed (HOB) Positioning: HOB elevated  Enteral Feeding Safety: tubing labeled as enteral feeding     Problem: Device-Related Complication Risk (Enteral Nutrition)  Goal: Safe, Effective Therapy Delivery  3/31/2024 1232 by Danya Foster RN  Outcome: Ongoing, Progressing  3/31/2024 0943 by Danya Foster RN  Outcome: Ongoing, Progressing  Intervention: Prevent Feeding-Related Adverse Events  Recent Flowsheet Documentation  Taken 3/31/2024 0800 by Danya Foster RN  Enteral Feeding Safety: tubing labeled as enteral feeding     Problem: Feeding Intolerance (Enteral Nutrition)  Goal: Feeding Tolerance  3/31/2024 1232 by Danya Foster RN  Outcome: Ongoing, Progressing  3/31/2024 0943 by Danya Foster RN  Outcome:  Ongoing, Progressing     Problem: Skin Injury Risk Increased  Goal: Skin Health and Integrity  3/31/2024 1232 by Danya Foster RN  Outcome: Ongoing, Progressing  3/31/2024 0943 by Danya Foster RN  Outcome: Ongoing, Progressing  Intervention: Optimize Skin Protection  Recent Flowsheet Documentation  Taken 3/31/2024 1200 by Danya Foster RN  Pressure Reduction Techniques:   frequent weight shift encouraged   weight shift assistance provided  Pressure Reduction Devices: pressure-redistributing mattress utilized  Skin Protection:   adhesive use limited   incontinence pads utilized   transparent dressing maintained   tubing/devices free from skin contact  Taken 3/31/2024 0800 by Danya Foster RN  Pressure Reduction Techniques:   frequent weight shift encouraged   weight shift assistance provided  Head of Bed (HOB) Positioning: HOB elevated  Pressure Reduction Devices: pressure-redistributing mattress utilized  Skin Protection:   adhesive use limited   incontinence pads utilized   transparent dressing maintained   tubing/devices free from skin contact

## 2024-03-31 NOTE — PLAN OF CARE
Problem: Adult Inpatient Plan of Care  Goal: Plan of Care Review  Outcome: Ongoing, Progressing  Flowsheets (Taken 3/31/2024 0119)  Progress: improving  Plan of Care Reviewed With: patient  Goal: Patient-Specific Goal (Individualized)  Outcome: Ongoing, Progressing  Goal: Absence of Hospital-Acquired Illness or Injury  Outcome: Ongoing, Progressing  Intervention: Identify and Manage Fall Risk  Recent Flowsheet Documentation  Taken 3/31/2024 0000 by Julia Gonzales RN  Safety Promotion/Fall Prevention:   safety round/check completed   room organization consistent   fall prevention program maintained   clutter free environment maintained   assistive device/personal items within reach  Taken 3/30/2024 2200 by Julia Gonzales RN  Safety Promotion/Fall Prevention:   safety round/check completed   room organization consistent   fall prevention program maintained   assistive device/personal items within reach   clutter free environment maintained  Taken 3/30/2024 2000 by Julia Gonzales RN  Safety Promotion/Fall Prevention:   safety round/check completed   room organization consistent   fall prevention program maintained   clutter free environment maintained   assistive device/personal items within reach  Intervention: Prevent Skin Injury  Recent Flowsheet Documentation  Taken 3/31/2024 0000 by Julia Gonzales RN  Body Position: position changed independently  Taken 3/30/2024 2200 by Julia Gonzales RN  Body Position: position changed independently  Taken 3/30/2024 2000 by Julia Gonzales RN  Body Position: position changed independently  Skin Protection: adhesive use limited  Intervention: Prevent and Manage VTE (Venous Thromboembolism) Risk  Recent Flowsheet Documentation  Taken 3/31/2024 0000 by Julia Gonzales RN  Activity Management: activity encouraged  Range of Motion: active ROM (range of motion) encouraged  Taken 3/30/2024 2000 by Julia Gonzales RN  Activity Management: activity  encouraged  VTE Prevention/Management:   bilateral   sequential compression devices off  Intervention: Prevent Infection  Recent Flowsheet Documentation  Taken 3/31/2024 0000 by Julia Gonzales RN  Infection Prevention:   single patient room provided   rest/sleep promoted   personal protective equipment utilized   hand hygiene promoted   equipment surfaces disinfected   environmental surveillance performed  Taken 3/30/2024 2200 by Julia Gonzales RN  Infection Prevention:   single patient room provided   rest/sleep promoted   personal protective equipment utilized   hand hygiene promoted   equipment surfaces disinfected   environmental surveillance performed  Taken 3/30/2024 2000 by Julia Gonzales RN  Infection Prevention:   single patient room provided   rest/sleep promoted   personal protective equipment utilized   hand hygiene promoted   equipment surfaces disinfected   environmental surveillance performed  Goal: Optimal Comfort and Wellbeing  Outcome: Ongoing, Progressing  Intervention: Provide Person-Centered Care  Recent Flowsheet Documentation  Taken 3/31/2024 0000 by Julia Gonzales RN  Trust Relationship/Rapport: care explained  Taken 3/30/2024 2000 by Julia Gonzales RN  Trust Relationship/Rapport: care explained  Goal: Readiness for Transition of Care  Outcome: Ongoing, Progressing     Problem: Swallowing Impairment  Goal: Optimal Eating/Swallowing without Aspir  Outcome: Ongoing, Progressing     Problem: Suicide Risk  Goal: Absence of Self-Harm  Outcome: Ongoing, Progressing  Intervention: Promote Psychosocial Wellbeing  Recent Flowsheet Documentation  Taken 3/31/2024 0000 by Julia Gonzales RN  Supportive Measures: active listening utilized  Family/Support System Care: self-care encouraged  Taken 3/30/2024 2000 by Julia Gonzales RN  Family/Support System Care:   support provided   self-care encouraged     Problem: Aspiration (Enteral Nutrition)  Goal: Absence of Aspiration  Signs and Symptoms  Outcome: Ongoing, Progressing  Intervention: Minimize Aspiration Risk  Recent Flowsheet Documentation  Taken 3/31/2024 0000 by Julia Gonzales RN  Head of Bed (HOB) Positioning: HOB elevated  Taken 3/30/2024 2200 by Julia Gonzales RN  Head of Bed (HOB) Positioning: HOB elevated  Taken 3/30/2024 2000 by Julia Gonzales RN  Head of Bed (HOB) Positioning: HOB elevated     Problem: Device-Related Complication Risk (Enteral Nutrition)  Goal: Safe, Effective Therapy Delivery  Outcome: Ongoing, Progressing     Problem: Feeding Intolerance (Enteral Nutrition)  Goal: Feeding Tolerance  Outcome: Ongoing, Progressing     Problem: Skin Injury Risk Increased  Goal: Skin Health and Integrity  Outcome: Ongoing, Progressing  Intervention: Optimize Skin Protection  Recent Flowsheet Documentation  Taken 3/31/2024 0000 by Julia Gonzales RN  Head of Bed (HOB) Positioning: HOB elevated  Taken 3/30/2024 2200 by Julia Gonzales RN  Head of Bed (HOB) Positioning: HOB elevated  Taken 3/30/2024 2000 by Julia Gonzales RN  Pressure Reduction Techniques: frequent weight shift encouraged  Head of Bed (HOB) Positioning: HOB elevated  Pressure Reduction Devices: pressure-redistributing mattress utilized  Skin Protection: adhesive use limited   Goal Outcome Evaluation:  Plan of Care Reviewed With: patient        Progress: improving

## 2024-03-31 NOTE — PLAN OF CARE
Problem: Adult Inpatient Plan of Care  Goal: Plan of Care Review  Outcome: Ongoing, Progressing  Goal: Patient-Specific Goal (Individualized)  Outcome: Ongoing, Progressing  Goal: Absence of Hospital-Acquired Illness or Injury  Outcome: Ongoing, Progressing  Intervention: Identify and Manage Fall Risk  Recent Flowsheet Documentation  Taken 3/31/2024 0800 by Danya Foster RN  Safety Promotion/Fall Prevention:   activity supervised   assistive device/personal items within reach   clutter free environment maintained   fall prevention program maintained   nonskid shoes/slippers when out of bed   room organization consistent   safety round/check completed  Intervention: Prevent Skin Injury  Recent Flowsheet Documentation  Taken 3/31/2024 0800 by Danya Foster RN  Body Position: position changed independently  Skin Protection:   adhesive use limited   incontinence pads utilized   transparent dressing maintained   tubing/devices free from skin contact  Intervention: Prevent and Manage VTE (Venous Thromboembolism) Risk  Recent Flowsheet Documentation  Taken 3/31/2024 0800 by Danya Foster RN  Activity Management: up in chair  Intervention: Prevent Infection  Recent Flowsheet Documentation  Taken 3/31/2024 0800 by Danya Foster RN  Infection Prevention:   environmental surveillance performed   hand hygiene promoted   rest/sleep promoted   single patient room provided  Goal: Optimal Comfort and Wellbeing  Outcome: Ongoing, Progressing  Intervention: Provide Person-Centered Care  Recent Flowsheet Documentation  Taken 3/31/2024 0800 by Danya Foster RN  Trust Relationship/Rapport:   care explained   questions answered   reassurance provided   empathic listening provided   choices provided   emotional support provided   questions encouraged   thoughts/feelings acknowledged  Goal: Readiness for Transition of Care  Outcome: Ongoing, Progressing     Problem: Swallowing Impairment  Goal: Optimal  Eating/Swallowing without Aspir  Outcome: Ongoing, Progressing     Problem: Suicide Risk  Goal: Absence of Self-Harm  Outcome: Ongoing, Progressing  Intervention: Promote Psychosocial Wellbeing  Recent Flowsheet Documentation  Taken 3/31/2024 0800 by Danya Foster RN  Family/Support System Care:   self-care encouraged   support provided  Sleep/Rest Enhancement: room darkened     Problem: Aspiration (Enteral Nutrition)  Goal: Absence of Aspiration Signs and Symptoms  Outcome: Ongoing, Progressing  Intervention: Minimize Aspiration Risk  Recent Flowsheet Documentation  Taken 3/31/2024 0800 by Danya Foster RN  Head of Bed (HOB) Positioning: HOB elevated  Enteral Feeding Safety: tubing labeled as enteral feeding     Problem: Device-Related Complication Risk (Enteral Nutrition)  Goal: Safe, Effective Therapy Delivery  Outcome: Ongoing, Progressing  Intervention: Prevent Feeding-Related Adverse Events  Recent Flowsheet Documentation  Taken 3/31/2024 0800 by Danya Foster RN  Enteral Feeding Safety: tubing labeled as enteral feeding     Problem: Feeding Intolerance (Enteral Nutrition)  Goal: Feeding Tolerance  Outcome: Ongoing, Progressing     Problem: Skin Injury Risk Increased  Goal: Skin Health and Integrity  Outcome: Ongoing, Progressing  Intervention: Optimize Skin Protection  Recent Flowsheet Documentation  Taken 3/31/2024 0800 by Danya Foster RN  Pressure Reduction Techniques:   frequent weight shift encouraged   weight shift assistance provided  Head of Bed (HOB) Positioning: HOB elevated  Pressure Reduction Devices: pressure-redistributing mattress utilized  Skin Protection:   adhesive use limited   incontinence pads utilized   transparent dressing maintained   tubing/devices free from skin contact   Goal Outcome Evaluation:

## 2024-03-31 NOTE — PROGRESS NOTES
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 on MRI of the brain 3/28/2024  Status post PEG placement 3/29/2024     Echo 3/29/2024: LV EF 61-65%, grade 1 diastolic dysfunction, mild mitral regurgitation, normal RV systolic pressure     Recommendations:  Patient still having productive cough but the color is starting to lighten up  Continue oral antibiotics for a total of 7 days  Patient stable from pulmonary standpoint to go to rehab    Encourage use of incentive spirometry  Guaifenesin  Oxygen supplement and titration to maintain saturation 90 to 95%: Currently on room air       Patient will need CT scan of the chest without contrast in 1 year     I personally reviewed the radiological studies               LOS: 4 days     Subjective     Patient reports productive cough    Objective     Vital signs for last 24 hours:  Vitals:    03/31/24 0300 03/31/24 0413 03/31/24 0600 03/31/24 0844   BP: (!) 185/82 (!) 185/82  138/82   BP Location: Right arm Right arm  Right arm   Patient Position: Lying Lying  Lying   Pulse: 72 75  84   Resp: 15 15  17   Temp: 98.3 °F (36.8 °C) 98.3 °F (36.8 °C)  97.8 °F (36.6 °C)   TempSrc: Oral Oral  Oral   SpO2:  97%  95%   Weight: 82.7 kg (182 lb 5.1 oz)  82.7 kg (182 lb 5.1 oz)    Height:           Intake/Output last 3 shifts:  I/O last 3 completed shifts:  In: 4668 [I.V.:2700; Other:781; NG/GT:737; IV Piggyback:450]  Out: 2785 [Urine:2785]  Intake/Output this shift:  I/O this shift:  In: 200 [Other:200]  Out: -       Radiology  Imaging Results (Last 24 Hours)       Procedure Component Value Units Date/Time    XR Abdomen KUB  [[id number]] Collected: 03/31/24 1049     Updated: 03/31/24 1052    Narrative:      XR ABDOMEN KUB    Date of Exam: 3/31/2024 10:39 AM EDT    Indication: No BM since 3/26    Comparison: None available.    Findings:  Gastrostomy tube projects over the stomach. The bowel gas pattern is nonobstructive.  No abnormal calcifications are seen. No organomegaly is identified.  No acute or suspicious osseous lesion is identified. Vasectomy clips are noted.      Impression:      Impression:  1.Nonobstructive bowel gas pattern.      Electronically Signed: Janes Luna MD    3/31/2024 10:50 AM EDT    Workstation ID: [id number]            Labs:  Results from last 7 days   Lab Units 03/31/24  0220   WBC 10*3/mm3 7.79   HEMOGLOBIN g/dL 13.7   HEMATOCRIT % 43.1   PLATELETS 10*3/mm3 222     Results from last 7 days   Lab Units 03/31/24  0220   SODIUM mmol/L 141   POTASSIUM mmol/L 4.0   CHLORIDE mmol/L 105   CO2 mmol/L 24.0   BUN mg/dL 15   CREATININE mg/dL 1.27   CALCIUM mg/dL 9.3   BILIRUBIN mg/dL 0.6   ALK PHOS U/L 62   ALT (SGPT) U/L 24   AST (SGOT) U/L 31   GLUCOSE mg/dL 98         Results from last 7 days   Lab Units 03/31/24  0220 03/30/24  1228   ALBUMIN g/dL 3.5 3.5             Results from last 7 days   Lab Units 03/31/24  0220   MAGNESIUM mg/dL 2.1     Results from last 7 days   Lab Units 03/29/24  1142   INR  1.07     Results from last 7 days   Lab Units 03/28/24  1550   TSH uIU/mL 2.770   FREE T4 ng/dL 1.02           Meds:   SCHEDULE  amLODIPine, 5 mg, Per PEG Tube, Q24H  ampicillin-sulbactam, 3 g, Intravenous, Q6H  cyanocobalamin, 1,000 mcg, Intramuscular, Daily  guaiFENesin-codeine, 10 mL, Oral, TID  polyethylene glycol, 17 g, Per PEG Tube, Daily  sodium chloride, 10 mL, Intravenous, Q12H      Infusions  dextrose 5 % and sodium chloride 0.45 %, 50 mL/hr, Last Rate: Stopped (03/30/24 1045)  lactated ringers, 75 mL/hr, Last Rate: Stopped (03/31/24 0644)  sodium chloride, 9 mL/hr      PRNs    acetaminophen     senna-docusate sodium **AND** polyethylene glycol **AND** bisacodyl **AND** bisacodyl    Calcium Replacement - Follow Nurse / BPA Driven Protocol    dextrose    dextrose    glucagon (human recombinant)    Magnesium Standard Dose Replacement - Follow Nurse / BPA Driven Protocol    melatonin    ondansetron ODT **OR** ondansetron    Phosphorus Replacement - Follow Nurse / BPA Driven Protocol    Potassium Replacement - Follow Nurse / BPA Driven Protocol    [COMPLETED] Insert Peripheral IV **AND** sodium chloride    sodium chloride    sodium chloride    sodium chloride    Physical Exam:  General Appearance:  Alert   HEENT:  Normocephalic, without obvious abnormality, Conjunctiva/corneas clear,.   Nares normal, no drainage     Neck:  Supple, symmetrical, trachea midline.   Lungs /Chest wall: Good respiratory effort with mild bilateral basal rhonchi, respirations unlabored, symmetrical wall movement.     Heart:  Regular rate and rhythm, S1 S2 normal  Abdomen: Soft, non-tender, no masses, no organomegaly.    Extremities: No edema, no clubbing or cyanosis     ROS  Constitutional: Negative for chills, fever and malaise/fatigue.   HENT: Negative.    Eyes: Negative.    Cardiovascular: Negative.    Respiratory: Positive for cough.    Skin: Negative.    Musculoskeletal: Negative.    Gastrointestinal: Dysphagia  Genitourinary: Negative.    Psychiatric/Behavioral: Negative.      I reviewed the recent clinical results  I personally reviewed the latest radiological studies    Part of this note may be an electronic transcription/translation of spoken language to printed text using the Dragon Dictation System.

## 2024-03-31 NOTE — PROGRESS NOTES
"Nutrition Services    Patient Name: Hever Guallpa  YOB: 1935  MRN: [id number]  Admission date: 3/26/2024    PROGRESS NOTE      Encounter Information: Check on for tube feeding.  TF began 3/30.  RD ordered Mag/Phos for review today.  Noted with plans to D/C today.         PO Diet: NPO Diet NPO Type: Strict NPO   PO Supplements: None ordered    PO Intake:  NPO       Current nutrition support: Isosource 1.5 @ 30mL/hour + 30mL/hour water flush   Nutrition support review: Documented as above per EMR       Labs (reviewed below): Hypokalemia - resolved         GI Function:  Last documented BM 3/27 (x 4 days ago) - Miralax daily with PRN bowel regimen - stat KUB ordered to rule out obstruction prior to D/C  Residuals WNL       Nutrition Intervention Updates: If KUB clear, gradual advancement to goal TF at 65 mL/hour     Continue water flush at 30 mL/hour        Results from last 7 days   Lab Units 03/31/24  0220 03/30/24  1228 03/30/24  0038   SODIUM mmol/L 141 140 144   POTASSIUM mmol/L 4.0 4.1  4.1 3.4*   CHLORIDE mmol/L 105 106 106   CO2 mmol/L 24.0 23.0 27.0   BUN mg/dL 15 18 24*   CREATININE mg/dL 1.27 1.23 1.36*   CALCIUM mg/dL 9.3 9.2 9.0   BILIRUBIN mg/dL 0.6 0.7  --    ALK PHOS U/L 62 62  --    ALT (SGPT) U/L 24 23  --    AST (SGOT) U/L 31 36  --    GLUCOSE mg/dL 98 102* 101*     Results from last 7 days   Lab Units 03/31/24  0220 03/29/24  0020 03/28/24  1550   MAGNESIUM mg/dL 2.1  --   --    PHOSPHORUS mg/dL 2.8  --   --    HEMOGLOBIN g/dL 13.7   < >  --    HEMATOCRIT % 43.1   < >  --    TRIGLYCERIDES mg/dL  --   --  55    < > = values in this interval not displayed.     No results found for: \"COVID19\"  Lab Results   Component Value Date    HGBA1C 5.60 03/28/2024       RD to follow up per protocol.    Electronically signed by:  Rubina Lentz PETEY  03/31/24 09:50 EDT    "

## 2024-06-03 NOTE — PROGRESS NOTES
BMI is >= 25 and <30. (Overweight) The following options were offered after discussion;: weight loss educational material (shared in after visit summary) and Information on healthy weight added to patient's after visit summary.

## 2024-06-03 NOTE — PATIENT INSTRUCTIONS
Health Maintenance Due   Topic Date Due    ZOSTER VACCINE (1 of 2) Never done    RSV Vaccine - Adults (1 - 1-dose 60+ series) Never done    ANNUAL WELLNESS VISIT  11/21/2020    DXA SCAN  12/04/2021    COVID-19 Vaccine (4 - 2023-24 season) 09/01/2023    You are due for Shingrix vaccination series ( the newest shingles vaccine).  It is a two shot series spaced 2-6 months apart. Please get this vaccine series started at your earliest convenience at your local pharmacy to help avoid shingles outbreak. It is more effective than the old Zostavax vaccine and is recommended even if you have had the Zostavax vaccine in the past.  Once the Shingrix series is completed, it does not need to be repeated.   For more information, please look at the website below:  [facility] Shingrix Vaccine Information

## 2024-06-05 PROBLEM — Z78.0 POSTMENOPAUSE: Status: ACTIVE | Noted: 2024-06-05

## 2024-06-05 NOTE — PROGRESS NOTES
The ABCs of the Annual Wellness Visit  Subsequent Medicare Wellness Visit    Subjective    History of Present Illness:  Hever Guallpa is a 88 y.o. male who presents for a Subsequent Medicare Wellness Visit.    The following portions of the patient's history were reviewed and   updated as appropriate: allergies, current medications, past family history, past medical history, past social history, past surgical history, and problem list.    Compared to one year ago, the patient feels his physical   health is worse.    Compared to one year ago, the patient feels his mental   health is worse.    Recent Hospitalizations:  This patient has had a [facility] admission record on file within the last 365 days.    Current Medical Providers:  Patient Care Team:  Maddie Martinez MD as PCP - General (Family Medicine)  Jayy Kaur MD as Consulting Physician (Urology)  Renae Lucio MD as Consulting Physician (Dermatology)    Outpatient Medications Prior to Visit   Medication Sig Dispense Refill    allopurinol (ZYLOPRIM) 100 MG tablet Take 1 tablet by mouth Daily.      amLODIPine (NORVASC) 5 MG tablet Take 1 tablet by mouth Daily. 30 tablet 0    atorvastatin (Lipitor) 40 MG tablet Take 1 tablet by mouth Daily. 30 tablet 0    gabapentin (NEURONTIN) 100 MG capsule Take 1 capsule by mouth 2 (Two) Times a Day.      melatonin 5 MG tablet tablet Take 1 tablet by mouth.      amoxicillin-clavulanate (AUGMENTIN) 875-125 MG per tablet Take 1 tablet by mouth 2 (Two) Times a Day. 10 tablet 0     Facility-Administered Medications Prior to Visit   Medication Dose Route Frequency Provider Last Rate Last Admin    aspirin EC tablet 325 mg  325 mg Oral Daily Michell Menchaca MD           No opioid medication identified on active medication list. I have reviewed chart for other potential  high risk medication/s and harmful drug interactions in the elderly.        Aspirin is on active medication list. Aspirin use is not  "indicated based on review of current medical condition/s. Risk of harm outweighs potential benefits. Patient instructed to discontinue this medication.  .    Patient Active Problem List   Diagnosis    Neck pain    Overweight with body mass index (BMI) of 27 to 27.9 in adult    Altered bowel function    Family history of malignant neoplasm of colon    History of malignant neoplasm of prostate    Hx of radiation therapy    Hyperlipidemia    Hypertension    Melanocytic nevus    Osteopenia    Sleep disorder    Thrombocytopenia    Vitamin D deficiency    Food impaction of esophagus    Dysphagia    Lung nodule     Advance Care Planning  Advance Directive is not on file.  ACP discussion was held with the patient during this visit. Patient does not have an advance directive, declines further assistance.     Objective    Vitals:    06/06/24 1146 06/06/24 1147   BP: 148/75 128/74   BP Location: Left arm Right arm   Patient Position: Sitting Sitting   Cuff Size: Adult Adult   Pulse: 70 72   Temp: 97 °F (36.1 °C)    TempSrc: Temporal    SpO2: 94%    Weight: 82.2 kg (181 lb 3.2 oz)    Height: 172.7 cm (68\")    PainSc: 0-No pain      Estimated body mass index is 27.55 kg/m² as calculated from the following:    Height as of this encounter: 172.7 cm (68\").    Weight as of this encounter: 82.2 kg (181 lb 3.2 oz).    BMI is >= 25 and <30. (Overweight) The following options were offered after discussion;: exercise counseling/recommendations and nutrition counseling/recommendations      Does the patient have evidence of cognitive impairment?  Memory decreased since stroke    Lab Results   Component Value Date    TRIG 55 03/28/2024    HDL 51 03/28/2024     (H) 03/28/2024    VLDL 11 03/28/2024    HGBA1C 5.60 03/28/2024        HEALTH RISK ASSESSMENT    Smoking Status:  Social History     Tobacco Use   Smoking Status Former    Current packs/day: 0.00    Average packs/day: 1 pack/day for 4.0 years (4.0 ttl pk-yrs)    Types: " Cigarettes    Start date: 1956    Quit date:     Years since quittin.4    Passive exposure: Past   Smokeless Tobacco Never     Alcohol Consumption:  Social History     Substance and Sexual Activity   Alcohol Use No     Fall Risk Screen:    STAN Fall Risk Assessment was completed, and patient is at HIGH risk for falls. Assessment completed on:2024    Depression Screenin/6/2024    11:46 AM   PHQ-2/PHQ-9 Depression Screening   Little Interest or Pleasure in Doing Things 0-->not at all   Feeling Down, Depressed or Hopeless 0-->not at all   PHQ-9: Brief Depression Severity Measure Score 0       Health Habits and Functional and Cognitive Screenin/6/2024    11:00 AM   Functional & Cognitive Status   Do you have difficulty preparing food and eating? No   Do you have difficulty bathing yourself, getting dressed or grooming yourself? No   Do you have difficulty using the toilet? No   Do you have difficulty moving around from place to place? No   Do you have trouble with steps or getting out of a bed or a chair? No   Current Diet Well Balanced Diet   Dental Exam Up to date   Eye Exam Up to date   Exercise (times per week) 2 times per week   Current Exercises Include Home Exercise Program (TV, Computer, Etc.)   Do you need help using the phone?  No   Are you deaf or do you have serious difficulty hearing?  Yes   Do you need help to go to places out of walking distance? No   Do you need help shopping? No   Do you need help preparing meals?  No   Do you need help with housework?  No   Do you need help with laundry? No   Do you need help taking your medications? No   Do you need help managing money? No   Do you ever drive or ride in a car without wearing a seat belt? No   Have you felt unusual stress, anger or loneliness in the last month? No   Who do you live with? Community   If you need help, do you have trouble finding someone available to you? No   Have you been bothered in the last four  weeks by sexual problems? No   Do you have difficulty concentrating, remembering or making decisions? No       Age-appropriate Screening Schedule:  Refer to the list below for future screening recommendations based on patient's age, sex and/or medical conditions. Orders for these recommended tests are listed in the plan section. The patient has been provided with a written plan.    Health Maintenance   Topic Date Due    ZOSTER VACCINE (1 of 2) Never done    RSV Vaccine - Adults (1 - 1-dose 60+ series) Never done    DXA SCAN  12/04/2021    COVID-19 Vaccine (4 - 2023-24 season) 09/01/2023    INFLUENZA VACCINE  08/01/2024    LIPID PANEL  03/28/2025    ANNUAL WELLNESS VISIT  06/06/2025    BMI FOLLOWUP  06/06/2025    TDAP/TD VACCINES (2 - Td or Tdap) 01/21/2030    Pneumococcal Vaccine 65+  Completed                CMS Preventative Services Quick Reference  Risk Factors Identified During Encounter  None Identified  The above risks/problems have been discussed with the patient.  Follow up actions/plans if indicated are seen below in the Assessment/Plan Section.  Pertinent information has been shared with the patient in the After Visit Summary.  An After Visit Summary and PPPS were made available to the patient.    Follow Up:   Next Medicare Wellness visit to be scheduled in 1 year.         Additional E&M Note during same encounter follows:  Patient has multiple medical problems which are significant and separately identifiable that require additional work above and beyond the Medicare Wellness Visit.        Chief Complaint  Hospital Follow Up Visit (DC from [facility] from a Brain Stem Stroke was at [facility]- /Wanting Tube taken out of stomach.  ) and Medicare Wellness-subsequent (Concerned with throat since having mini strokes. )    Subjective        HPI  Hever Guallpa also presents   Chief Complaint   Patient presents with    Hospital Follow Up Visit     DC from [facility] from a Brain Stem Stroke was at [facility]-    Wanting Tube taken out of stomach.      Medicare Wellness-subsequent     Concerned with throat since having mini strokes.    .  History of Present Illness  The patient is an 88-year-old male who is here today for his annual wellness exam for Medicare as well as osteopenia, vitamin D deficiency, primary hypertension, sleep disorder, hyperlipidemia, history of malignant neoplasm of the prostate, esophageal dysphagia, overweight with a body mass index of 27 and altered bowel function. He is accompanied by his daughter.  He stated that he gave his daughter permission to stay.    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 Subsequently, he was evaluated and subsequently transferred to a rehabilitation center. Currently, he has been residing in an independent living facility for the past 2 days. His last eye and dental examination were conducted approximately a year ago, and he has recently acquired a 's license. His dentist in [place] initially recommended dental implants, but due to his recent stroke, the implants were not pursued. Consequently, an oral surgeon, Dr. Thompson, recommended removal of the anchor and adding an implant to his lower partial plate. The patient has been able to self-feed through the PEG tube, but the daughter reports that it needs to be removed. The patient has no history of smoking, but has been exposed to chemicals throughout his life. He denies experiencing hemoptysis, sputum, or wheezing. He also denies experiencing chest pressure or palpitations, and has no history of cardiac issues. He has undergone two swallow tests, one of which indicated improved results compared to the initial one. He avoids food with gravy to facilitate swallowing. His throat specialist, who has been performing throat exercises, has deemed it safe for the PEG tube to be removed. The second test  "indicated some improvement, albeit not as anticipated. He has been advised to swallow approximately 50 times daily. Visiting nurses are scheduled to visit him. His voice is rough, which has improved. He has not scheduled an appointment with a neurologist. He has been experiencing episodes of lightheadedness and occasional unsteadiness, leading to multiple falls this year without any injury. His blood pressure was elevated during his hospital stay due to food impaction, and he was on antihypertensive medication. His physical health has worsened since his stroke last year, and his mental health has slightly worsened since last year. He does not take aspirin and has not been drug-free for the past year or longer. He does not have an advance directive and declines information about it. He has hearing problems and uses hearing aids. His sleep has improved since starting melatonin. He has annual check-ups with his urologist for his prostate cancer, with the next appointment scheduled for 07/2024. He denies any bowel issues.    Review of Systems   Constitutional:  Positive for activity change and appetite change. Negative for fever.   HENT:  Positive for hearing loss and trouble swallowing.    Respiratory:  Positive for shortness of breath.    Gastrointestinal:  Positive for abdominal pain. Negative for diarrhea, nausea and rectal pain.   Musculoskeletal:  Positive for arthralgias and myalgias.   Neurological:  Positive for weakness and light-headedness.       Objective   Vital Signs:  /74 (BP Location: Right arm, Patient Position: Sitting, Cuff Size: Adult)   Pulse 72   Temp 97 °F (36.1 °C) (Temporal)   Ht 172.7 cm (68\")   Wt 82.2 kg (181 lb 3.2 oz)   SpO2 94%   BMI 27.55 kg/m²     Physical Exam  Vitals reviewed.   Constitutional:       General: He is not in acute distress.     Appearance: Normal appearance. He is well-developed. He is not ill-appearing or toxic-appearing.   HENT:      Head: Normocephalic and " atraumatic.      Right Ear: Tympanic membrane, ear canal and external ear normal.      Left Ear: Tympanic membrane, ear canal and external ear normal.      Nose: Nose normal.      Mouth/Throat:      Mouth: Mucous membranes are moist.      Pharynx: No posterior oropharyngeal erythema.   Eyes:      Extraocular Movements: Extraocular movements intact.      Conjunctiva/sclera: Conjunctivae normal.      Pupils: Pupils are equal, round, and reactive to light.   Cardiovascular:      Rate and Rhythm: Normal rate and regular rhythm.      Heart sounds: Normal heart sounds.   Pulmonary:      Effort: Pulmonary effort is normal.      Comments: Decreased breath sounds bilaterally  Abdominal:      General: Bowel sounds are normal. There is no distension.      Palpations: Abdomen is soft. There is no mass.      Tenderness: There is abdominal tenderness. There is no right CVA tenderness or left CVA tenderness.   Musculoskeletal:         General: Swelling present. Normal range of motion.      Cervical back: Neck supple.      Right lower leg: Edema present.      Left lower leg: Edema present.   Skin:     General: Skin is warm.   Neurological:      General: No focal deficit present.      Mental Status: He is alert and oriented to person, place, and time.   Psychiatric:         Mood and Affect: Mood normal.         Behavior: Behavior normal.      Feeding tube in place to the right of the umbilicus  Physical Exam  Carotids sound normal.  Lungs are normal.  Heart is normal.    Vital Signs  Blood pressure reading was 148/75 and 128/74. Oxygen saturation, pulse rate, and temperature are all normal.          Results  Imaging  Tiny nodule in the left upper lung.             Assessment and Plan   Diagnoses and all orders for this visit:    1. Encounter for annual general medical examination with abnormal findings in adult (Primary)    2. Osteopenia, unspecified location  -     DEXA Bone Density Axial; Future    3. Vitamin D deficiency  -      Cancel: Vitamin D,25-Hydroxy; Future  -     Vitamin D,25-Hydroxy; Future    4. Primary hypertension  -     Cancel: CBC w AUTO Differential; Future  -     Cancel: Comprehensive Metabolic Panel; Future  -     Comprehensive Metabolic Panel; Future  -     CBC w AUTO Differential; Future    5. Sleep disorder  -     Cancel: Vitamin B12; Future  -     Vitamin B12; Future    6. Hyperlipidemia, unspecified hyperlipidemia type  -     Cancel: Lipid panel; Future  -     Lipid panel; Future    7. History of malignant neoplasm of prostate  -     PSA DIAGNOSTIC; Future    8. Esophageal dysphagia  -     Cancel: Magnesium; Future  -     Ambulatory Referral to Gastroenterology  -     Ambulatory Referral to Home Health  -     Magnesium; Future    9. Overweight with body mass index (BMI) of 27 to 27.9 in adult  Assessment & Plan:  Patient's (Body mass index is 27.55 kg/m².) indicates that they are overweight with health conditions that include hypertension and dyslipidemias . Weight is improving with lifestyle modifications. BMI is is above average; BMI management plan is completed. We discussed portion control and increasing exercise.       10. Altered bowel function  -     Cancel: TSH Rfx On Abnormal To Free T4; Future  -     TSH Rfx On Abnormal To Free T4; Future    11. Cerebrovascular accident (CVA), unspecified mechanism  -     Ambulatory Referral to Neurology  -     Ambulatory Referral to Home Health    12. Age-related osteoporosis without current pathological fracture  -     DEXA Bone Density Axial; Future    Other orders  -     amLODIPine (NORVASC) 5 MG tablet; Take 1 tablet by mouth Daily.  Dispense: 90 tablet; Refill: 1  -     atorvastatin (Lipitor) 40 MG tablet; Take 1 tablet by mouth Daily.  Dispense: 90 tablet; Refill: 1  -     allopurinol (ZYLOPRIM) 100 MG tablet; Take 1 tablet by mouth Daily.  Dispense: 90 tablet; Refill: 1  -     gabapentin (NEURONTIN) 100 MG capsule; Take 1 capsule by mouth 2 (Two) Times a Day.   Dispense: 180 capsule; Refill: 1  -     melatonin 5 MG tablet tablet; Take 1 tablet by mouth Every Night.  Dispense: 90 tablet; Refill: 1      Assessment & Plan  1. Annual wellness exam for Medicare.  The patient's PEG tube is due for removal. A repeat CT scan is scheduled for 03/2025. The patient's memory has declined. A referral to a gastroenterologist will be made for the removal of the PEG tube. A repeat CT scan will be scheduled for 03/2025. A referral to neurology, gastroenterology, and home health care will be made. A referral to [facility] in [place] will be made. Prescriptions for amlodipine 5 mg once daily, atorvastatin, allopurinol, gabapentin, and melatonin will be provided. Laboratory tests will be ordered to monitor cholesterol, kidney function, liver function, and thyroid function.         Follow Up   No follow-ups on file.  Patient was given instructions and counseling regarding his condition or for health maintenance advice. Please see specific information pulled into the AVS if appropriate.   Patient or patient representative verbalized consent for the use of Ambient Listening during the visit with  Maddie Martinez MD for chart documentation. 6/6/2024  18:17 EDT

## 2024-06-06 ENCOUNTER — OFFICE VISIT (OUTPATIENT)
Dept: FAMILY MEDICINE CLINIC | Facility: CLINIC | Age: 89
End: 2024-06-06
Payer: MEDICARE

## 2024-06-06 VITALS
TEMPERATURE: 97 F | OXYGEN SATURATION: 94 % | HEART RATE: 72 BPM | WEIGHT: 181.2 LBS | BODY MASS INDEX: 27.46 KG/M2 | DIASTOLIC BLOOD PRESSURE: 74 MMHG | SYSTOLIC BLOOD PRESSURE: 128 MMHG | HEIGHT: 68 IN

## 2024-06-06 DIAGNOSIS — I63.9 CEREBROVASCULAR ACCIDENT (CVA), UNSPECIFIED MECHANISM: ICD-10-CM

## 2024-06-06 DIAGNOSIS — E78.5 HYPERLIPIDEMIA, UNSPECIFIED HYPERLIPIDEMIA TYPE: ICD-10-CM

## 2024-06-06 DIAGNOSIS — E55.9 VITAMIN D DEFICIENCY: ICD-10-CM

## 2024-06-06 DIAGNOSIS — Z00.01 ENCOUNTER FOR ANNUAL GENERAL MEDICAL EXAMINATION WITH ABNORMAL FINDINGS IN ADULT: Primary | ICD-10-CM

## 2024-06-06 DIAGNOSIS — E66.3 OVERWEIGHT WITH BODY MASS INDEX (BMI) OF 27 TO 27.9 IN ADULT: ICD-10-CM

## 2024-06-06 DIAGNOSIS — G47.9 SLEEP DISORDER: ICD-10-CM

## 2024-06-06 DIAGNOSIS — Z85.46 HISTORY OF MALIGNANT NEOPLASM OF PROSTATE: ICD-10-CM

## 2024-06-06 DIAGNOSIS — I10 PRIMARY HYPERTENSION: ICD-10-CM

## 2024-06-06 DIAGNOSIS — M81.0 AGE-RELATED OSTEOPOROSIS WITHOUT CURRENT PATHOLOGICAL FRACTURE: ICD-10-CM

## 2024-06-06 DIAGNOSIS — R13.19 ESOPHAGEAL DYSPHAGIA: ICD-10-CM

## 2024-06-06 DIAGNOSIS — R19.8 ALTERED BOWEL FUNCTION: ICD-10-CM

## 2024-06-06 DIAGNOSIS — M85.80 OSTEOPENIA, UNSPECIFIED LOCATION: ICD-10-CM

## 2024-06-06 PROBLEM — R91.1 LUNG NODULE: Status: ACTIVE | Noted: 2024-06-06

## 2024-06-06 PROCEDURE — 1126F AMNT PAIN NOTED NONE PRSNT: CPT | Performed by: PREVENTIVE MEDICINE

## 2024-06-06 PROCEDURE — G0439 PPPS, SUBSEQ VISIT: HCPCS | Performed by: PREVENTIVE MEDICINE

## 2024-06-06 PROCEDURE — 99214 OFFICE O/P EST MOD 30 MIN: CPT | Performed by: PREVENTIVE MEDICINE

## 2024-06-06 PROCEDURE — 1159F MED LIST DOCD IN RCRD: CPT | Performed by: PREVENTIVE MEDICINE

## 2024-06-06 PROCEDURE — 1160F RVW MEDS BY RX/DR IN RCRD: CPT | Performed by: PREVENTIVE MEDICINE

## 2024-06-06 PROCEDURE — 1170F FXNL STATUS ASSESSED: CPT | Performed by: PREVENTIVE MEDICINE

## 2024-06-06 RX ORDER — GABAPENTIN 100 MG/1
100 CAPSULE ORAL 2 TIMES DAILY
Qty: 180 CAPSULE | Refills: 1 | Status: SHIPPED | OUTPATIENT
Start: 2024-06-06

## 2024-06-06 RX ORDER — UREA 10 %
5 LOTION (ML) TOPICAL
COMMUNITY
End: 2024-06-06 | Stop reason: SDUPTHER

## 2024-06-06 RX ORDER — ALLOPURINOL 100 MG/1
100 TABLET ORAL DAILY
COMMUNITY
End: 2024-06-06 | Stop reason: SDUPTHER

## 2024-06-06 RX ORDER — ATORVASTATIN CALCIUM 40 MG/1
40 TABLET, FILM COATED ORAL DAILY
Qty: 90 TABLET | Refills: 1 | Status: SHIPPED | OUTPATIENT
Start: 2024-06-06

## 2024-06-06 RX ORDER — GABAPENTIN 100 MG/1
100 CAPSULE ORAL 2 TIMES DAILY
COMMUNITY
End: 2024-06-06 | Stop reason: SDUPTHER

## 2024-06-06 RX ORDER — AMLODIPINE BESYLATE 5 MG/1
5 TABLET ORAL
Qty: 90 TABLET | Refills: 1 | Status: SHIPPED | OUTPATIENT
Start: 2024-06-06

## 2024-06-06 RX ORDER — ALLOPURINOL 100 MG/1
100 TABLET ORAL DAILY
Qty: 90 TABLET | Refills: 1 | Status: SHIPPED | OUTPATIENT
Start: 2024-06-06

## 2024-06-06 RX ORDER — UREA 10 %
5 LOTION (ML) TOPICAL NIGHTLY
Qty: 90 TABLET | Refills: 1 | Status: SHIPPED | OUTPATIENT
Start: 2024-06-06

## 2024-06-06 NOTE — ASSESSMENT & PLAN NOTE
Patient's (Body mass index is 27.55 kg/m².) indicates that they are overweight with health conditions that include hypertension and dyslipidemias . Weight is improving with lifestyle modifications. BMI is is above average; BMI management plan is completed. We discussed portion control and increasing exercise.

## 2024-06-20 ENCOUNTER — TELEPHONE (OUTPATIENT)
Dept: FAMILY MEDICINE CLINIC | Facility: CLINIC | Age: 89
End: 2024-06-20
Payer: MEDICARE

## 2024-06-20 NOTE — TELEPHONE ENCOUNTER
Caller: MAHENDRA    Relationship: [facility]    Best call back number: [phone]    What orders are you requesting (i.e. lab or imaging): VERBAL ORDER FOR THREE VISITS FOR OCCUPATIONAL THERAPY VISITS     In what timeframe would the patient need to come in: ASAP    Where will you receive your lab/imaging services: [facility]     Additional notes:     MAHENDRA WITH [facility] CALLED AND SAID HE DID AN ASSESSMENT ON THE PATIENT YESTERDAY AND WOULD LIKE TO SEE THE PATIENT FOR THREE MORE VISITS FOR OCCUPATIONAL THERAPY

## 2024-06-26 ENCOUNTER — TELEPHONE (OUTPATIENT)
Dept: FAMILY MEDICINE CLINIC | Facility: CLINIC | Age: 89
End: 2024-06-26
Payer: MEDICARE

## 2024-06-26 NOTE — TELEPHONE ENCOUNTER
Caller: JACOBO DEWITT    Relationship: [facility]    Best call back number: [phone]     What orders are you requesting (i.e. lab or imaging): VERBAL ORDERS FOR SPEECH THERAPY ONCE A WEEK FOR 2 WEEKS, OFF THE 3RD WEEK, THEN ONCE A WEEK FOR 2 MORE WEEKS.    In what timeframe would the patient need to come in: AS SOON AS POSSIBLE    Where will you receive your lab/imaging services: IN HOME    Additional notes: PLEASE CALL WITH VERBAL ORDERS.         "  Emergency Department    [address] [facility] [address]    Phone:  [phone]    Fax:  [phone]                                       Jim Rcihard   MRN: [id number]    Department:   Emergency Department   Date of Visit:  2017           Patient Information     Date Of Birth          1954        Your diagnoses for this visit were:     Suicidal ideation     Acute alcoholic intoxication in alcoholism, uncomplicated (H)        You were seen by Kurt Villa MD, Dagoberto Garcia MD, and Sebastien Gasca MD.      Follow-up Information     Follow up with Clinic, [facility] In 2 days.    Contact information:    [address] [facility]  [address]  [facility] [address] [phone]          Follow up with  Emergency Department.    Specialty:  EMERGENCY MEDICINE    Why:  As needed, If symptoms worsen    Contact information:    [address] [facility] [id number] [phone]        Discharge Instructions         Warning Signs of Suicide and What You Can Do  If you think a person could be suicidal, ask, \"Have you thought about suicide?\" If they say \"yes,\" they may already have a plan for how and when they will attempt it. Find out as much as you can. The more detailed the plan, and the easier it is to carry out, the more danger the person is in right now.    Know the warning signs  The warning signs for suicide include:    Threats or talk of suicide    Buying a gun or other weapon    Statements such as \"Soon, I won't be a problem\" or \"Nothing matters\"    Giving away items they own, making out a will, or planning their     Suddenly being happy or calm after being depressed  Get help  Don't try to handle this alone. You can be the most help by getting the person to a trained professional. Why? Because suicidal thinking may be a sign of depression, a serious but treatable illness. Once it is treated, suicidal thinking often goes away.  In " "an emergency--call 911  Don't leave the person alone. Call 911 or a 24-hour suicide crisis hotline. It can be found in the white pages of your phone book under \"Suicide.\" You can also get help at the nearest hospital emergency room (ER).  Don't keep it a secret  Call a mental health clinic or a licensed mental health professional in your area: a psychiatrist, clinical psychologist, psychiatric or clinical , marriage and family counselor, or clergy. Tell them you need help for a person who is thinking about suicide.  Resources    National Suicide Prevention Qyqvarba002-868-2082 (803-063-LLOH)www.suicidepreventionlifeline.org    National Suicide Odkwnqu167-318-0298 (800-SUICIDE)    National [place] of Mental Tlsasq689-150-2119ofx.[facility].nih.gov    National [place] on Mental Kkbbpoe913-418-1515uug.debra.org    Mental Health Sektcbv534-089-0182anr.nmha.org     1181-1605 [place]. com. cn. [address] [place] [address]. All rights reserved. This information is not intended as a substitute for professional medical care. Always follow your healthcare professional's instructions.          Alcohol Intoxication  Alcohol intoxication occurs when you drink alcohol faster than your liver can remove it from your system. The following facts are important to remember:    It can take 10 minutes or more to start to feel the effects of a drink, so you can easily get more intoxicated than you intended.    One drink may be more than 1 serving of alcohol. Depending on the drink, it can be 2 to 4 servings.    It takes about an hour for your body to metabolize (clear) 1 serving. If you have more than 1 drink, it can take a couple of hours or more.    Many things affect how drinks will affect you, including whether you ve eaten, how fast you drink, your size, how much you normally drink (or not), medications you take, chronic diseases you have, and gender.  Signs and symptoms of alcohol poisoning  The " "following are signs and symptoms of alcohol poisoning:  Mild impairment    Reduced inhibitions    Slurred speech    Drowsiness    Decreased fine motor skills  Moderate impairment    Erratic behavior, aggression, depression    Impaired judgment    Confusion    Concentration difficulties    Coordination problems  Severe impairment    Vomiting    Seizures    Unconsciousness    Cold, clammy    Slow or irregular breathing    Hypothermia (low body temperature)    Coma  Health effects  Alcohol abuse causes health problems. Sometimes this can happen after only drinking a  little.\" There is no set number of drinks or amount of alcohol that defines too much. The more you drink at one time, and the more frequently you drink determine both the short-term and long-term health effects. It affects all parts of your body and your health, including your:    Brain. Alcohol is a central nervous system depressant. It can damage parts of the brain that affect your balance, memory, thinking, and emotions. It can cause memory loss, blackouts, depression, agitation, sleep cycle changes, and seizures. These changes may or may not be reversible.    Heart and vascular system. Alcohol affects multiple areas. It can damage heart muscle causing cardiomyopathy, which is a weakening and stretching of the heart muscle. This can lead to trouble breathing, an irregular heartbeat, atrial fibrillation, leg swelling, and heart failure. It makes the blood vessels stiffen causing hypertension (high blood pressure). All of these problems increase your risk of having heart attacks or strokes.    Liver. Alcohol causes fat to build up in the liver, affecting its normal function. This increases the risk for hepatitis, leading to abdominal pain, appetite loss, jaundice, bleeding problems, liver fibrosis, and cirrhosis. This in turn can affect your ability to fight off infections, and can cause diabetes. The liver changes prevent it from removing toxins in your " blood that can cause encephalopathy. Signs of this are confusion, altered level of consciousness, personality changes, memory loss, seizures, coma, and death.    Pancreas. Alcohol can cause inflammation of the pancreas, or pancreatitis. This can cause pain in your abdomen, fever, and diabetes.    Immune system. Alcohol weakens your immune system in a number of ways. It suppresses your immune system making it harder to fight off infections and colds. You will also have a higher risk of certain infections like pneumonia and tuberculosis.    Cancer risk. Alcohol raises your risk of cancer of the mouth, esophagus, pharynx, larynx, liver, and breast.    Sexual function. Alcohol abuse can also lead to sexual problems.  Alcohol use during pregnancy may cause permanent damage to the growing baby.  Home care  The following guidelines will help you care for yourself at home:    Don't drink any more alcohol.    Don't drive until all effects of the alcohol have worn off.    Don't operate machinery that can cause injuries.    Get lots of rest over the next few days. Drink plenty of water and other non-alcoholic liquids. Try to eat regular meals.    If you have been drinking heavily on a daily basis, you may go through alcohol withdrawal. The usual symptoms last 3 to 4 days and may include nervousness, shakiness, nausea, sweating, sleeplessness, and can even cause seizures and a serious withdrawal symptom called delirium tremens, or DTs. During this time, it is best that you stay with family or friends who can help and support you. You can also admit yourself to a residential detox program. If your symptoms are severe (seizures, severe shakiness, confusion), contact your doctor or call an ambulance for help (see below).   Follow-up care  If alcohol is a problem in your life, these are some organizations that can help you:    Alcoholics Anonymous offers support through a self-help fellowship. There are no dues or fees. See the  Yellow Pages and call for time and place of meetings. Find AA online at www.aa.org.    [employer] offers support to families of alcohol users. Contact 497-345-4216, or online at www.al-anoselvin.org.    National [place] on Alcoholism and Drug Dependence can be reached at 415-824-0932, or online at www.ncadd.org.    There are also inpatient and residential alcohol detox programs. Check the Internet or phonebook Yellow Pages under  Drug Abuse and Treatment Centers.   Call 911  Call 911 if any of these occur:    Trouble breathing or slow irregular breathing    Chest pain    Sudden weakness on one side of your body or sudden trouble speaking    Heavy bleeding or vomiting blood    Very drowsy or trouble awakening    Fainting or loss of consciousness    Rapid heart rate    Seizure  When to seek medical care  Get prompt medical attention if any of the following occur:    Severe shakiness     Fever over 100.4  F (38.0  C)    Confusion or hallucinations (seeing, hearing, or feeling things that are not there)    Pain in your upper abdomen that gets worse    Repeated vomiting    4101-4947 [employer]. [address] [place] [address]. All rights reserved. This information is not intended as a substitute for professional medical care. Always follow your healthcare professional's instructions.          24 Hour Appointment Hotline       To make an appointment at any [facility], call [phone] ([phone]). If you don't have a family doctor or clinic, we will help you find one. [facility] clinics are conveniently located to serve the needs of you and your family.             Review of your medicines      Our records show that you are taking the medicines listed below. If these are incorrect, please call your family doctor or clinic.        Dose / Directions Last dose taken    acamprosate 333 MG EC tablet   Commonly known as:  CAMPRAL        Refills:  0        acetaminophen 325 MG tablet   Commonly known  as:  TYLENOL   Dose:  650 mg   Quantity:  100 tablet        Take 2 tablets (650 mg) by mouth every 4 hours as needed for mild pain   Refills:  0        albuterol 108 (90 BASE) MCG/ACT Inhaler   Commonly known as:  PROAIR HFA/PROVENTIL HFA/VENTOLIN HFA   Dose:  2 puff   Quantity:  1 Inhaler        Inhale 2 puffs into the lungs 4 times daily   Refills:  1        ATORVASTATIN CALCIUM PO   Dose:  10 mg        Take 10 mg by mouth At Bedtime   Refills:  0        clonazePAM 0.5 MG tablet   Commonly known as:  klonoPIN   Dose:  0.25 mg   Quantity:  60 tablet        Take 0.5 tablets (0.25 mg) by mouth 2 times daily as needed for anxiety   Refills:  0        GABAPENTIN PO   Dose:  300 mg        Take 300 mg by mouth 3 times daily   Refills:  0        HYDROXYZINE HCL PO   Dose:  50 mg        Take 50 mg by mouth 3 times daily as needed for other (anxiety)   Refills:  0        ibuprofen 400 MG tablet   Commonly known as:  ADVIL/MOTRIN   Dose:  400 mg   Quantity:  120 tablet        Take 1 tablet (400 mg) by mouth every 8 hours as needed for moderate pain   Refills:  1        MELOXICAM PO        Refills:  0        mirtazapine 15 MG tablet   Commonly known as:  REMERON   Dose:  15 mg   Quantity:  30 tablet        Take 1 tablet (15 mg) by mouth At Bedtime   Refills:  0        multivitamin, therapeutic with minerals Tabs tablet   Dose:  1 tablet        Take 1 tablet by mouth daily   Refills:  0        nicotine 21 MG/24HR 24 hr patch   Commonly known as:  NICODERM CQ   Dose:  1 patch   Quantity:  30 patch        Place 1 patch onto the skin daily   Refills:  0        OMEGA-3 FISH OIL PO   Dose:  1 g        Take 1 g by mouth daily   Refills:  0        oxyCODONE 5 MG IR tablet   Commonly known as:  ROXICODONE   Dose:  5 mg   Quantity:  30 tablet        Take 1 tablet (5 mg) by mouth every 6 hours as needed for moderate to severe pain   Refills:  0        predniSONE 20 MG tablet   Commonly known as:  DELTASONE   Dose:  40 mg   Quantity:  1  tablet        Take 2 tablets (40 mg) by mouth daily   Refills:  0        QUETIAPINE FUMARATE PO   Dose:  50 mg        Take 50 mg by mouth 3 times daily as needed   Refills:  0        spironolactone-hydrochlorothiazide 25-25 MG per tablet   Commonly known as:  ALDACTAZIDE   Dose:  1 tablet        Take 1 tablet by mouth daily   Refills:  0        TRAZODONE HCL PO   Dose:  300 mg        Take 300 mg by mouth At Bedtime   Refills:  0        venlafaxine 150 MG 24 hr capsule   Commonly known as:  EFFEXOR-XR   Dose:  150 mg        Take 150 mg by mouth daily   Refills:  0                Procedures and tests performed during your visit     Acetaminophen level    Alcohol breath test POCT    Alcohol ethyl    Basic metabolic panel    CBC with platelets differential    Drug abuse screen 77 urine ([place],RH,SH)    EKG 12-lead, tracing only    Pulse oximetry nursing    Salicylate level    Vital signs      Orders Needing Specimen Collection     None      Pending Results     No orders found for last 2 day(s).            Pending Culture Results     No orders found for last 2 day(s).       Test Results from your hospital stay           2/7/2017  9:21 PM - Interface, Spot Influence Results      Component Results     Component Value Ref Range & Units Status    WBC 5.6 4.0 - 11.0 10e9/L Final    RBC Count 4.12 (L) 4.4 - 5.9 10e12/L Final    Hemoglobin 12.2 (L) 13.3 - 17.7 g/dL Final    Hematocrit 36.4 (L) 40.0 - 53.0 % Final    MCV 88 78 - 100 fl Final    MCH 29.6 26.5 - 33.0 pg Final    MCHC 33.5 31.5 - 36.5 g/dL Final    RDW 15.5 (H) 10.0 - 15.0 % Final    Platelet Count 154 150 - 450 10e9/L Final    Diff Method Automated Method  Final    % Neutrophils 67.4 % Final    % Lymphocytes 23.5 % Final    % Monocytes 7.5 % Final    % Eosinophils 0.7 % Final    % Basophils 0.5 % Final    % Immature Granulocytes 0.4 % Final    Nucleated RBCs 0 0 /100 Final    Absolute Neutrophil 3.8 1.6 - 8.3 10e9/L Final    Absolute Lymphocytes 1.3 0.8 - 5.3 10e9/L Final     Absolute Monocytes 0.4 0.0 - 1.3 10e9/L Final    Absolute Eosinophils 0.0 0.0 - 0.7 10e9/L Final    Absolute Basophils 0.0 0.0 - 0.2 10e9/L Final    Abs Immature Granulocytes 0.0 0 - 0.4 10e9/L Final    Absolute Nucleated RBC 0.0  Final         2/7/2017  9:40 PM - Interface, Flexilab Results      Component Results     Component Value Ref Range & Units Status    Sodium 135 133 - 144 mmol/L Final    Potassium 3.5 3.4 - 5.3 mmol/L Final    Chloride 101 94 - 109 mmol/L Final    Carbon Dioxide 23 20 - 32 mmol/L Final    Anion Gap 11 3 - 14 mmol/L Final    Glucose 120 (H) 70 - 99 mg/dL Final    Urea Nitrogen 22 7 - 30 mg/dL Final    Creatinine 1.28 (H) 0.66 - 1.25 mg/dL Final    GFR Estimate 57 (L) >60 mL/min/1.7m2 Final    Non  GFR Calc    GFR Estimate If Black 69 >60 mL/min/1.7m2 Final    African American GFR Calc    Calcium 8.0 (L) 8.5 - 10.1 mg/dL Final         2/7/2017  9:39 PM - Interface, Flexilab Results      Component Results     Component Value Ref Range & Units Status    Acetaminophen Level <2  Therapeutic range: 10-20 mg/L   mg/L Final         2/7/2017  9:39 PM - Interface, Flexilab Results      Component Results     Component Value Ref Range & Units Status    Salicylate Level 3 mg/dL Final    Therapeutic:        <20   Anti inflammatory:  15-30           2/7/2017  9:40 PM - Interface, Flexilab Results      Component Results     Component Value Ref Range & Units Status    Ethanol g/dL 0.26 (H) <0.01 g/dL Final         2/7/2017 11:02 PM - Interface, Flexilab Results      Component Results     Component Value Ref Range & Units Status    Amphetamine Qual Urine  NEG Final    Negative   Cutoff for a negative amphetamine is 500 ng/mL or less.      Barbiturates Qual Urine  NEG Final    Negative   Cutoff for a negative barbiturate is 200 ng/mL or less.      Benzodiazepine Qual Urine  NEG Final    Negative   Cutoff for a negative benzodiazepine is 200 ng/mL or less.      Cannabinoids Qual Urine  NEG  Final    Negative   Cutoff for a negative cannabinoid is 50 ng/mL or less.      Cocaine Qual Urine  NEG Final    Negative   Cutoff for a negative cocaine is 300 ng/mL or less.      Opiates Qualitative Urine  NEG Final    Negative   Cutoff for a negative opiate is 300 ng/mL or less.      PCP Qual Urine  NEG Final    Negative   Cutoff for a negative PCP is 25 ng/mL or less.           2/8/2017  9:10 AM - Jacqueline Bourne RN      Component Results     Component Value Ref Range & Units Status    Alcohol Breath Test 0.021 (A) 0.00 - 0.01                 Clinical Quality Measure: Blood Pressure Screening     Your blood pressure was checked while you were in the emergency department today. The last reading we obtained was  BP: 147/67 mmHg . Please read the guidelines below about what these numbers mean and what you should do about them.  If your systolic blood pressure (the top number) is less than 120 and your diastolic blood pressure (the bottom number) is less than 80, then your blood pressure is normal. There is nothing more that you need to do about it.  If your systolic blood pressure (the top number) is 120-139 or your diastolic blood pressure (the bottom number) is 80-89, your blood pressure may be higher than it should be. You should have your blood pressure rechecked within a year by a primary care provider.  If your systolic blood pressure (the top number) is 140 or greater or your diastolic blood pressure (the bottom number) is 90 or greater, you may have high blood pressure. High blood pressure is treatable, but if left untreated over time it can put you at risk for heart attack, stroke, or kidney failure. You should have your blood pressure rechecked by a primary care provider within the next 4 weeks.  If your provider in the emergency department today gave you specific instructions to follow-up with your doctor or provider even sooner than that, you should follow that instruction and not wait for up to 4  "weeks for your follow-up visit.        Thank you for choosing [facility]       Thank you for choosing [facility] for your care. Our goal is always to provide you with excellent care. Hearing back from our patients is one way we can continue to improve our services. Please take a few minutes to complete the written survey that you may receive in the mail after you visit with us. Thank you!        [employer] Access Information     [employer] lets you send messages to your doctor, view your test results, renew your prescriptions, schedule appointments and more. To sign up, go to [email or web address] . Click on \"Log in\" on the left side of the screen, which will take you to the Welcome page. Then click on \"Sign up Now\" on the right side of the page.     You will be asked to enter the access code listed below, as well as some personal information. Please follow the directions to create your username and password.     Your access code is: [email or web address]  Expires: 2017  9:54 AM     Your access code will  in 90 days. If you need help or a new code, please call your [facility] clinic or [phone].        Care EveryWhere ID     This is your Care EveryWhere ID. This could be used by other organizations to access your [facility] medical records  [id number]        After Visit Summary       This is your record. Keep this with you and show to your community pharmacist(s) and doctor(s) at your next visit.                  "

## 2024-07-08 ENCOUNTER — TELEPHONE (OUTPATIENT)
Dept: FAMILY MEDICINE CLINIC | Facility: CLINIC | Age: 89
End: 2024-07-08
Payer: MEDICARE

## 2024-07-10 NOTE — TELEPHONE ENCOUNTER
Patients daughter stated he is not too concerned about having this lab work done and he doesn't want to do it at this time.

## 2024-08-05 ENCOUNTER — OFFICE VISIT (OUTPATIENT)
Dept: FAMILY MEDICINE CLINIC | Facility: CLINIC | Age: 89
End: 2024-08-05
Payer: MEDICARE

## 2024-08-05 VITALS
HEIGHT: 68 IN | DIASTOLIC BLOOD PRESSURE: 84 MMHG | OXYGEN SATURATION: 94 % | HEART RATE: 74 BPM | WEIGHT: 183.8 LBS | BODY MASS INDEX: 27.86 KG/M2 | SYSTOLIC BLOOD PRESSURE: 147 MMHG | TEMPERATURE: 97 F

## 2024-08-05 DIAGNOSIS — E79.0 ELEVATED URIC ACID IN BLOOD: ICD-10-CM

## 2024-08-05 DIAGNOSIS — E66.3 OVERWEIGHT WITH BODY MASS INDEX (BMI) OF 27 TO 27.9 IN ADULT: ICD-10-CM

## 2024-08-05 DIAGNOSIS — Z74.09 MOBILITY IMPAIRED: Primary | ICD-10-CM

## 2024-08-05 DIAGNOSIS — I10 PRIMARY HYPERTENSION: ICD-10-CM

## 2024-08-05 DIAGNOSIS — R91.1 LUNG NODULE: ICD-10-CM

## 2024-08-05 DIAGNOSIS — Z13.220 SCREENING CHOLESTEROL LEVEL: ICD-10-CM

## 2024-08-05 DIAGNOSIS — Z85.46 HISTORY OF MALIGNANT NEOPLASM OF PROSTATE: ICD-10-CM

## 2024-08-05 PROCEDURE — 99214 OFFICE O/P EST MOD 30 MIN: CPT | Performed by: PREVENTIVE MEDICINE

## 2024-08-05 PROCEDURE — 1159F MED LIST DOCD IN RCRD: CPT | Performed by: PREVENTIVE MEDICINE

## 2024-08-05 PROCEDURE — 1160F RVW MEDS BY RX/DR IN RCRD: CPT | Performed by: PREVENTIVE MEDICINE

## 2024-08-05 PROCEDURE — G2211 COMPLEX E/M VISIT ADD ON: HCPCS | Performed by: PREVENTIVE MEDICINE

## 2024-08-05 PROCEDURE — 1126F AMNT PAIN NOTED NONE PRSNT: CPT | Performed by: PREVENTIVE MEDICINE

## 2024-08-05 NOTE — PROGRESS NOTES
Subjective   Hever Guallpa is a 88 y.o. male presents for   Chief Complaint   Patient presents with    Fall     Wanting to get a motorized wheelchair for mobility issues.        Health Maintenance Due   Topic Date Due    ZOSTER VACCINE (1 of 2) Never done    RSV Vaccine - Adults (1 - 1-dose 60+ series) Never done    DXA SCAN  12/04/2021    COVID-19 Vaccine (4 - 2023-24 season) 09/01/2023    INFLUENZA VACCINE  08/01/2024       Fall  Pertinent negatives include no abdominal pain.      History of Present Illness  The patient is an 88-year-old male who is here today for mobility, impaired, lung nodule, overweight with a body mass index of 27, primary hypertension, history of malignant neoplasm of the prostate.    He is seeking a motorized wheelchair to prevent falls. His mobility is limited due to his recent move to a new residence in [facility], which requires him to walk long distances for therapy. A nurse suggested acquiring a motorized wheelchair, a cheaper meal wagon for self-care due to his broken hip. Medicare has suggested the acquiring a motorized wheelchair, which he has been using for about a month. He has regained some dexterity to drive and has been approved for a motorized wheelchair twice, but requires a doctor's signature. He has an old wheelchair in his apartment, which he can move without difficulty. He has not experienced any crashes.    He has a lung nodule.    His blood pressure was slightly elevated today.    He has an appointment with his urologist on Friday for his prostate cancer. His last blood work was done in 04/2024. He is scheduled for an ultrasound of his kidneys on Friday.    His memory is not optimal. 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 His right leg bothers him slightly, but he can live with it.    His current medications include amlodipine, allopurinol, a cholesterol-lowering medication, melatonin for sleep, and gabapentin.    Supplemental  "Information  His biggest problem is his throat. He had a stroke and has trouble swallowing. He has been doing therapy on his throat. He is now eating by himself. Since moving out, he has been taking care of himself. He lives by himself. He had a feeding tube in his stomach for a while. His daughter has been taking care of things for him.    Vitals:    08/05/24 1425 08/05/24 1426   BP: 144/78 147/84   BP Location: Right arm Left arm   Patient Position: Sitting Sitting   Cuff Size: Adult Adult   Pulse: 73 74   Temp: 97 °F (36.1 °C)    TempSrc: Temporal    SpO2: 94%    Weight: 83.4 kg (183 lb 12.8 oz)    Height: 172.7 cm (68\")      Body mass index is 27.95 kg/m².    Current Outpatient Medications on File Prior to Visit   Medication Sig Dispense Refill    allopurinol (ZYLOPRIM) 100 MG tablet Take 1 tablet by mouth Daily. 90 tablet 1    amLODIPine (NORVASC) 5 MG tablet Take 1 tablet by mouth Daily. 90 tablet 1    atorvastatin (Lipitor) 40 MG tablet Take 1 tablet by mouth Daily. 90 tablet 1    gabapentin (NEURONTIN) 100 MG capsule Take 1 capsule by mouth 2 (Two) Times a Day. 180 capsule 1    melatonin 5 MG tablet tablet Take 1 tablet by mouth Every Night. 90 tablet 1     Current Facility-Administered Medications on File Prior to Visit   Medication Dose Route Frequency Provider Last Rate Last Admin    aspirin EC tablet 325 mg  325 mg Oral Daily Michell Menchaca MD           The following portions of the patient's history were reviewed and updated as appropriate: allergies, current medications, past family history, past medical history, past social history, past surgical history, and problem list.    Review of Systems   Gastrointestinal:  Negative for abdominal pain, GERD and indigestion.   Neurological:  Positive for dizziness, speech difficulty, weakness and light-headedness.       Objective   Physical Exam  Vitals reviewed.   Constitutional:       General: He is not in acute distress.     Appearance: Normal appearance. " He is well-developed. He is not ill-appearing or toxic-appearing.   HENT:      Head: Normocephalic and atraumatic.      Right Ear: Tympanic membrane, ear canal and external ear normal.      Left Ear: Tympanic membrane, ear canal and external ear normal.      Nose: Nose normal.      Mouth/Throat:      Mouth: Mucous membranes are moist.      Pharynx: No posterior oropharyngeal erythema.   Eyes:      Extraocular Movements: Extraocular movements intact.      Conjunctiva/sclera: Conjunctivae normal.      Pupils: Pupils are equal, round, and reactive to light.   Cardiovascular:      Rate and Rhythm: Normal rate and regular rhythm.      Heart sounds: Normal heart sounds.   Pulmonary:      Effort: Pulmonary effort is normal.      Breath sounds: Normal breath sounds.   Abdominal:      General: Bowel sounds are normal. There is no distension.      Palpations: Abdomen is soft. There is no mass.      Tenderness: There is no abdominal tenderness.   Musculoskeletal:         General: Normal range of motion.      Cervical back: Neck supple.   Skin:     General: Skin is warm.   Neurological:      General: No focal deficit present.      Mental Status: He is alert and oriented to person, place, and time.   Psychiatric:         Mood and Affect: Mood normal.         Behavior: Behavior normal.       Physical Exam  Vital Signs  Body mass index is 27. Blood pressure is 144/78 and 147/84.    PHQ-9 Total Score:    Results           Assessment & Plan   Diagnoses and all orders for this visit:    1. Mobility impaired (Primary)    2. Lung nodule    3. Overweight with body mass index (BMI) of 27 to 27.9 in adult    4. Primary hypertension  -     CBC Auto Differential  -     Comprehensive Metabolic Panel    5. History of malignant neoplasm of prostate    6. Elevated uric acid in blood  -     Uric Acid    7. Screening cholesterol level  -     Lipid Panel      Assessment & Plan  1. Mobility impairment.  A referral will be made for a motorized  wheelchair evaluation. The paperwork will be sent to Medicare for coverage.    2. Lung nodule.  A lung nodule will be evaluated in 03/2024.    3. Elevated blood pressure.  His weight has slightly increased, and his blood pressure was slightly elevated today at 144/78 and 147/84. He is advised to monitor his blood pressure over the next few weeks and provide 10 readings. These can be obtained during physical therapy. A written form will be provided for his reference.    4. Malignant neoplasm of the prostate.  Blood work will be ordered to monitor his uric acid levels.    5. Mobility impairment.    Follow-up  A follow-up visit is scheduled for 6 months from now.    Patient Instructions     Health Maintenance Due   Topic Date Due    ZOSTER VACCINE (1 of 2) Never done    RSV Vaccine - Adults (1 - 1-dose 60+ series) Never done    DXA SCAN  12/04/2021    COVID-19 Vaccine (4 - 2023-24 season) 09/01/2023    INFLUENZA VACCINE  08/01/2024    Check blood pressure cuff for accuracy and send 10 blood pressures over 2 weeks.  Watch sodium, alcohol and weight     12 hour fast for labs       Patient or patient representative verbalized consent for the use of Ambient Listening during the visit with  Maddie Martinez MD for chart documentation. 8/5/2024  18:37 EDT

## 2024-08-05 NOTE — PATIENT INSTRUCTIONS
Health Maintenance Due   Topic Date Due    ZOSTER VACCINE (1 of 2) Never done    RSV Vaccine - Adults (1 - 1-dose 60+ series) Never done    DXA SCAN  12/04/2021    COVID-19 Vaccine (4 - 2023-24 season) 09/01/2023    INFLUENZA VACCINE  08/01/2024    Check blood pressure cuff for accuracy and send 10 blood pressures over 2 weeks.  Watch sodium, alcohol and weight     12 hour fast for labs

## 2024-08-15 ENCOUNTER — HOSPITAL ENCOUNTER (OUTPATIENT)
Dept: ULTRASOUND IMAGING | Facility: HOSPITAL | Age: 89
Discharge: HOME | End: 2024-08-15
Admitting: UROLOGY
Payer: MEDICARE

## 2024-08-15 DIAGNOSIS — C61 CANCER OF PROSTATE: ICD-10-CM

## 2024-08-15 PROCEDURE — 76775 US EXAM ABDO BACK WALL LIM: CPT

## 2024-08-28 ENCOUNTER — TRANSCRIBE ORDERS (OUTPATIENT)
Dept: ADMINISTRATIVE | Facility: HOSPITAL | Age: 89
End: 2024-08-28
Payer: MEDICARE

## 2024-08-28 DIAGNOSIS — N28.1 ACQUIRED CYST OF KIDNEY: Primary | ICD-10-CM

## 2024-09-04 ENCOUNTER — TELEPHONE (OUTPATIENT)
Dept: FAMILY MEDICINE CLINIC | Facility: CLINIC | Age: 89
End: 2024-09-04
Payer: MEDICARE

## 2024-09-18 ENCOUNTER — OFFICE (AMBULATORY)
Age: 89
End: 2024-09-18

## 2024-09-18 ENCOUNTER — OFFICE (AMBULATORY)
Dept: RURAL CLINIC 3 | Facility: CLINIC | Age: 89
End: 2024-09-18

## 2024-09-18 VITALS
SYSTOLIC BLOOD PRESSURE: 134 MMHG | HEART RATE: 68 BPM | HEIGHT: 68 IN | SYSTOLIC BLOOD PRESSURE: 134 MMHG | WEIGHT: 185 LBS | DIASTOLIC BLOOD PRESSURE: 70 MMHG | DIASTOLIC BLOOD PRESSURE: 70 MMHG | WEIGHT: 185 LBS | WEIGHT: 185 LBS | HEART RATE: 68 BPM | DIASTOLIC BLOOD PRESSURE: 70 MMHG | HEIGHT: 68 IN | HEIGHT: 68 IN | HEART RATE: 68 BPM | HEART RATE: 68 BPM | SYSTOLIC BLOOD PRESSURE: 134 MMHG | HEIGHT: 68 IN | WEIGHT: 185 LBS | HEIGHT: 68 IN | SYSTOLIC BLOOD PRESSURE: 134 MMHG | SYSTOLIC BLOOD PRESSURE: 134 MMHG | DIASTOLIC BLOOD PRESSURE: 70 MMHG | HEIGHT: 68 IN | WEIGHT: 185 LBS | SYSTOLIC BLOOD PRESSURE: 134 MMHG | WEIGHT: 185 LBS | HEIGHT: 68 IN | HEART RATE: 68 BPM | WEIGHT: 185 LBS | HEART RATE: 68 BPM | DIASTOLIC BLOOD PRESSURE: 70 MMHG | DIASTOLIC BLOOD PRESSURE: 70 MMHG | DIASTOLIC BLOOD PRESSURE: 70 MMHG | SYSTOLIC BLOOD PRESSURE: 134 MMHG | HEART RATE: 68 BPM

## 2024-09-18 DIAGNOSIS — Z43.1 ENCOUNTER FOR ATTENTION TO GASTROSTOMY: ICD-10-CM

## 2024-09-18 PROCEDURE — 99213 OFFICE O/P EST LOW 20 MIN: CPT | Performed by: NURSE PRACTITIONER

## 2024-09-26 ENCOUNTER — TELEPHONE (OUTPATIENT)
Dept: FAMILY MEDICINE CLINIC | Facility: CLINIC | Age: 89
End: 2024-09-26
Payer: MEDICARE

## 2024-10-09 ENCOUNTER — TELEPHONE (OUTPATIENT)
Dept: FAMILY MEDICINE CLINIC | Facility: CLINIC | Age: 89
End: 2024-10-09
Payer: MEDICARE

## 2024-10-09 NOTE — TELEPHONE ENCOUNTER
Please call patient and make sure he is doing well after the weakness that caused him to go to [facility] emergency room if he does not answer phone call or respond through [employer] we need to send him a letter.

## 2024-11-19 NOTE — TELEPHONE ENCOUNTER
Rx Refill Note  Requested Prescriptions     Pending Prescriptions Disp Refills    Melatonin 5 MG capsule [Pharmacy Med Name: MELATONIN 5 MG SOFTGEL]  1     Sig: TAKE 1 CAPSULE BY MOUTH EVERY DAY AT NIGHT      Last office visit with prescribing clinician: 8/5/2024   Last telemedicine visit with prescribing clinician: Visit date not found   Next office visit with prescribing clinician: 2/6/2025                         Would you like a call back once the refill request has been completed: [] Yes [] No    If the office needs to give you a call back, can they leave a voicemail: [] Yes [] No    Akila Rosales MA  11/19/24, 15:29 EST

## 2024-11-21 RX ORDER — AMLODIPINE BESYLATE 5 MG/1
5 TABLET ORAL DAILY
Qty: 90 TABLET | Refills: 0 | Status: SHIPPED | OUTPATIENT
Start: 2024-11-21

## 2024-11-29 RX ORDER — ALLOPURINOL 100 MG/1
100 TABLET ORAL DAILY
Qty: 90 TABLET | Refills: 1 | Status: SHIPPED | OUTPATIENT
Start: 2024-11-29

## 2024-12-11 RX ORDER — ATORVASTATIN CALCIUM 40 MG/1
40 TABLET, FILM COATED ORAL DAILY
Qty: 90 TABLET | Refills: 1 | Status: SHIPPED | OUTPATIENT
Start: 2024-12-11

## 2024-12-13 ENCOUNTER — LAB (OUTPATIENT)
Dept: FAMILY MEDICINE CLINIC | Facility: CLINIC | Age: 89
End: 2024-12-13
Payer: MEDICARE

## 2024-12-13 LAB
ALBUMIN SERPL-MCNC: 3.9 G/DL (ref 3.5–5.2)
ALBUMIN/GLOB SERPL: 1.3 G/DL
ALP SERPL-CCNC: 83 U/L (ref 39–117)
ALT SERPL W P-5'-P-CCNC: 28 U/L (ref 1–41)
ANION GAP SERPL CALCULATED.3IONS-SCNC: 9 MMOL/L (ref 5–15)
AST SERPL-CCNC: 24 U/L (ref 1–40)
BASOPHILS # BLD AUTO: 0.08 10*3/MM3 (ref 0–0.2)
BASOPHILS NFR BLD AUTO: 1.4 % (ref 0–1.5)
BILIRUB SERPL-MCNC: 0.5 MG/DL (ref 0–1.2)
BUN SERPL-MCNC: 14 MG/DL (ref 8–23)
BUN/CREAT SERPL: 10.4 (ref 7–25)
CALCIUM SPEC-SCNC: 9.2 MG/DL (ref 8.6–10.5)
CHLORIDE SERPL-SCNC: 106 MMOL/L (ref 98–107)
CHOLEST SERPL-MCNC: 141 MG/DL (ref 0–200)
CO2 SERPL-SCNC: 25 MMOL/L (ref 22–29)
CREAT SERPL-MCNC: 1.35 MG/DL (ref 0.76–1.27)
DEPRECATED RDW RBC AUTO: 38 FL (ref 37–54)
EGFRCR SERPLBLD CKD-EPI 2021: 50.2 ML/MIN/1.73
EOSINOPHIL # BLD AUTO: 0.24 10*3/MM3 (ref 0–0.4)
EOSINOPHIL NFR BLD AUTO: 4.2 % (ref 0.3–6.2)
ERYTHROCYTE [DISTWIDTH] IN BLOOD BY AUTOMATED COUNT: 12.4 % (ref 12.3–15.4)
GLOBULIN UR ELPH-MCNC: 2.9 GM/DL
GLUCOSE SERPL-MCNC: 90 MG/DL (ref 65–99)
HCT VFR BLD AUTO: 44.6 % (ref 37.5–51)
HDLC SERPL-MCNC: 37 MG/DL (ref 40–60)
HGB BLD-MCNC: 14.8 G/DL (ref 13–17.7)
IMM GRANULOCYTES # BLD AUTO: 0.05 10*3/MM3 (ref 0–0.05)
IMM GRANULOCYTES NFR BLD AUTO: 0.9 % (ref 0–0.5)
LDLC SERPL CALC-MCNC: 87 MG/DL (ref 0–100)
LDLC/HDLC SERPL: 2.32 {RATIO}
LYMPHOCYTES # BLD AUTO: 0.92 10*3/MM3 (ref 0.7–3.1)
LYMPHOCYTES NFR BLD AUTO: 16.1 % (ref 19.6–45.3)
MCH RBC QN AUTO: 28.1 PG (ref 26.6–33)
MCHC RBC AUTO-ENTMCNC: 33.2 G/DL (ref 31.5–35.7)
MCV RBC AUTO: 84.8 FL (ref 79–97)
MONOCYTES # BLD AUTO: 0.53 10*3/MM3 (ref 0.1–0.9)
MONOCYTES NFR BLD AUTO: 9.3 % (ref 5–12)
NEUTROPHILS NFR BLD AUTO: 3.89 10*3/MM3 (ref 1.7–7)
NEUTROPHILS NFR BLD AUTO: 68.1 % (ref 42.7–76)
NRBC BLD AUTO-RTO: 0 /100 WBC (ref 0–0.2)
PLATELET # BLD AUTO: 223 10*3/MM3 (ref 140–450)
PMV BLD AUTO: 10.4 FL (ref 6–12)
POTASSIUM SERPL-SCNC: 4.2 MMOL/L (ref 3.5–5.2)
PROT SERPL-MCNC: 6.8 G/DL (ref 6–8.5)
RBC # BLD AUTO: 5.26 10*6/MM3 (ref 4.14–5.8)
SODIUM SERPL-SCNC: 140 MMOL/L (ref 136–145)
TRIGL SERPL-MCNC: 91 MG/DL (ref 0–150)
URATE SERPL-MCNC: 4.8 MG/DL (ref 3.4–7)
VLDLC SERPL-MCNC: 17 MG/DL (ref 5–40)
WBC NRBC COR # BLD AUTO: 5.71 10*3/MM3 (ref 3.4–10.8)

## 2024-12-13 PROCEDURE — 84550 ASSAY OF BLOOD/URIC ACID: CPT | Performed by: PREVENTIVE MEDICINE

## 2024-12-13 PROCEDURE — 80061 LIPID PANEL: CPT | Performed by: PREVENTIVE MEDICINE

## 2024-12-13 PROCEDURE — 80053 COMPREHEN METABOLIC PANEL: CPT | Performed by: PREVENTIVE MEDICINE

## 2024-12-13 PROCEDURE — 85025 COMPLETE CBC W/AUTO DIFF WBC: CPT | Performed by: PREVENTIVE MEDICINE

## 2024-12-15 ENCOUNTER — TELEPHONE (OUTPATIENT)
Dept: FAMILY MEDICINE CLINIC | Facility: CLINIC | Age: 89
End: 2024-12-15
Payer: MEDICARE

## 2024-12-16 NOTE — TELEPHONE ENCOUNTER
Detailed VM left with Provider's comments. Instructed patient to call with any questions or concerns.    Kaye Thakur MA  12/16/24

## 2024-12-16 NOTE — TELEPHONE ENCOUNTER
Sent message through [employer] TO RELAY ----- Message from Maddie Martinez sent at 12/15/2024  6:12 AM EST -----  Kidney function has improved from 49-50 but you would still qualify for a medication to help prolong your kidney function and decrease your all cause mortality 1 of these medicines is Jardiance and the other 1 is Farxiga we can prescribe 1 of those and see if it is affordable if not we will try the other 1 please let me know if you agree.  Call if any other questions or concerns

## 2025-02-05 NOTE — PATIENT INSTRUCTIONS
Health Maintenance Due   Topic Date Due    ZOSTER VACCINE (1 of 2) Never done    RSV Vaccine - Adults (1 - 1-dose 75+ series) Never done    INFLUENZA VACCINE  07/01/2024    COVID-19 Vaccine (4 - 2024-25 season) 09/01/2024    Check blood pressure cuff for accuracy and send 10 blood pressures over 2 weeks.  Watch sodium, alcohol and weight.  Call us if melatonin not helping you sleep    Patient to 12 hour fast for labs and get Chest CT on or about 3/28/2025.    [facility]

## 2025-02-05 NOTE — ASSESSMENT & PLAN NOTE
Patient's (There is no height or weight on file to calculate BMI.) indicates that they are overweight with health conditions that include obstructive sleep apnea, hypertension, dyslipidemias, and GERD . Weight is unchanged. BMI is above average; BMI management plan is completed. We discussed portion control and increasing exercise.

## 2025-02-06 ENCOUNTER — HOSPITAL ENCOUNTER (OUTPATIENT)
Dept: CT IMAGING | Facility: HOSPITAL | Age: OVER 89
Discharge: HOME | End: 2025-02-06
Admitting: PREVENTIVE MEDICINE
Payer: MEDICARE

## 2025-02-06 ENCOUNTER — OFFICE VISIT (OUTPATIENT)
Dept: FAMILY MEDICINE CLINIC | Facility: CLINIC | Age: OVER 89
End: 2025-02-06
Payer: MEDICARE

## 2025-02-06 VITALS
HEART RATE: 73 BPM | SYSTOLIC BLOOD PRESSURE: 142 MMHG | HEIGHT: 68 IN | DIASTOLIC BLOOD PRESSURE: 84 MMHG | WEIGHT: 190.4 LBS | TEMPERATURE: 97.3 F | OXYGEN SATURATION: 97 % | BODY MASS INDEX: 28.85 KG/M2

## 2025-02-06 DIAGNOSIS — I10 PRIMARY HYPERTENSION: Primary | ICD-10-CM

## 2025-02-06 DIAGNOSIS — R91.1 LUNG NODULE: ICD-10-CM

## 2025-02-06 DIAGNOSIS — E55.9 VITAMIN D DEFICIENCY: ICD-10-CM

## 2025-02-06 DIAGNOSIS — R19.8 ALTERED BOWEL FUNCTION: ICD-10-CM

## 2025-02-06 DIAGNOSIS — Z85.46 HISTORY OF MALIGNANT NEOPLASM OF PROSTATE: ICD-10-CM

## 2025-02-06 DIAGNOSIS — G47.9 SLEEP DISORDER: ICD-10-CM

## 2025-02-06 DIAGNOSIS — R13.19 ESOPHAGEAL DYSPHAGIA: ICD-10-CM

## 2025-02-06 DIAGNOSIS — E66.3 OVERWEIGHT WITH BODY MASS INDEX (BMI) OF 28 TO 28.9 IN ADULT: ICD-10-CM

## 2025-02-06 DIAGNOSIS — E78.5 HYPERLIPIDEMIA, UNSPECIFIED HYPERLIPIDEMIA TYPE: ICD-10-CM

## 2025-02-06 DIAGNOSIS — M85.80 OSTEOPENIA, UNSPECIFIED LOCATION: ICD-10-CM

## 2025-02-06 PROCEDURE — 1159F MED LIST DOCD IN RCRD: CPT | Performed by: PREVENTIVE MEDICINE

## 2025-02-06 PROCEDURE — 1126F AMNT PAIN NOTED NONE PRSNT: CPT | Performed by: PREVENTIVE MEDICINE

## 2025-02-06 PROCEDURE — 99214 OFFICE O/P EST MOD 30 MIN: CPT | Performed by: PREVENTIVE MEDICINE

## 2025-02-06 PROCEDURE — 71250 CT THORAX DX C-: CPT

## 2025-02-06 PROCEDURE — 1160F RVW MEDS BY RX/DR IN RCRD: CPT | Performed by: PREVENTIVE MEDICINE

## 2025-02-06 RX ORDER — POLYETHYLENE GLYCOL 3350
2 GRANULES (GRAM) MISCELLANEOUS DAILY
Qty: 3350 G | Refills: 3 | Status: SHIPPED | OUTPATIENT
Start: 2025-02-06

## 2025-02-06 RX ORDER — GABAPENTIN 100 MG/1
100 CAPSULE ORAL 3 TIMES DAILY
Qty: 90 CAPSULE | Refills: 1 | Status: SHIPPED | OUTPATIENT
Start: 2025-02-06

## 2025-02-06 RX ORDER — NEBULIZER AND COMPRESSOR
1 EACH MISCELLANEOUS DAILY
Qty: 1 EACH | Refills: 0 | Status: SHIPPED | OUTPATIENT
Start: 2025-02-06

## 2025-02-06 NOTE — PROGRESS NOTES
Subjective   Hever Guallpa is a 89 y.o. male presents for   Chief Complaint   Patient presents with    Insomnia     Takes melatonin but does not help  Needing lung CT follow up    Hypertension     Has taken medication today.    Numbness     Usually at night on the left hand tingle        There are no preventive care reminders to display for this patient.    Insomnia  Symptoms include numbness.    Hypertension       History of Present Illness  The patient is an 89-year-old male who is here today to follow up on primary hypertension, osteopenia, sleep disorder, vitamin D deficiency, esophageal dysphagia, lung nodule, overweight with a body mass index of 28, altered bowel function, history of malignant neoplasm of the prostate, and hyperlipidemia.    He reports a recent x-ray conducted at [facility] revealed a small pulmonary nodule, which was deemed non-problematic but requires monitoring.    He has been experiencing urinary urgency, necessitating immediate access to a restroom. This issue has been ongoing since his annual COVID-19 screening at [facility], but it was not considered severe enough to warrant medication.    He also reports persistent constipation, which he manages with milk of magnesia every 2 to 3 days. Without this intervention, he experiences difficulty in passing stool. He has previously used MiraLAX without any adverse effects.    He has been diagnosed with neuropathy, characterized by numbness and tingling in his hands, which often disrupts his sleep. He has discontinued gabapentin and is seeking alternative treatment options.    He has been experiencing sleep disturbances, often lying awake until 2:00 or 3:00 AM. He has been managing this with over-the-counter melatonin, taking two 5 mg doses, which he reports as effective.    He reports no issues with swallowing but occasionally experiences gagging and regurgitation. He is cautious while eating and ensures adequate hydration. He does  "not wish to consult with the swallowing specialists at [facility].    He has been monitoring his cholesterol levels annually but is uncertain about the results. He has been adhering to a low-saturated fat diet and avoids fried foods.    He has been undergoing annual screenings for prostate cancer at [facility].    He reports no hematuria or hematochezia. He also reports no systemic symptoms such as fever, chills, night sweats, or weight loss. He has gained weight since his stay at [facility].    Supplemental Information  He had his eyes checked about a year ago and they were okay. He did not need new glasses. He recently had his hearing aids cleaned and checked. He was told he lost 20% of his hearing and would never get it back.    MEDICATIONS  Current: melatonin, MiraLAX, milk of magnesia  Discontinued: gabapentin    Vitals:    02/06/25 1247 02/06/25 1252   BP: 153/77 142/84   BP Location: Left arm Right arm   Patient Position: Sitting Sitting   Cuff Size: Adult Adult   Pulse: 69 73   Temp: 97.3 °F (36.3 °C)    TempSrc: Temporal    SpO2: 97%    Weight: 86.4 kg (190 lb 6.4 oz)    Height: 172.7 cm (68\")      Body mass index is 28.95 kg/m².    Current Outpatient Medications on File Prior to Visit   Medication Sig Dispense Refill    amLODIPine (NORVASC) 5 MG tablet TAKE 1 TABLET BY MOUTH EVERY DAY 90 tablet 0    atorvastatin (LIPITOR) 40 MG tablet TAKE 1 TABLET BY MOUTH EVERY DAY 90 tablet 1    Melatonin 5 MG capsule TAKE 1 CAPSULE BY MOUTH EVERY DAY AT NIGHT 90 each 1    [DISCONTINUED] allopurinol (ZYLOPRIM) 100 MG tablet TAKE 1 TABLET BY MOUTH EVERY DAY (Patient not taking: Reported on 2/6/2025) 90 tablet 1    [DISCONTINUED] gabapentin (NEURONTIN) 100 MG capsule Take 1 capsule by mouth 2 (Two) Times a Day. (Patient not taking: Reported on 2/6/2025) 180 capsule 1     Current Facility-Administered Medications on File Prior to Visit   Medication Dose Route Frequency Provider Last Rate Last Admin    " aspirin EC tablet 325 mg  325 mg Oral Daily Michell Menchaca MD           The following portions of the patient's history were reviewed and updated as appropriate: allergies, current medications, past family history, past medical history, past social history, past surgical history, and problem list.    Review of Systems   Gastrointestinal:  Positive for constipation.        Patient is having difficulty swallowing at times the food does pool.  He refuses to get additional speech therapy presently.  Will let us know if he agrees.   Neurological:  Positive for numbness.   Psychiatric/Behavioral:  Positive for sleep disturbance. The patient has insomnia.        Objective   Physical Exam  Vitals reviewed.   Constitutional:       General: He is not in acute distress.     Appearance: Normal appearance. He is well-developed. He is not ill-appearing or toxic-appearing.   HENT:      Head: Normocephalic and atraumatic.      Right Ear: Tympanic membrane, ear canal and external ear normal.      Left Ear: Tympanic membrane, ear canal and external ear normal.      Nose: Nose normal.      Mouth/Throat:      Mouth: Mucous membranes are moist.      Pharynx: No posterior oropharyngeal erythema.   Eyes:      Extraocular Movements: Extraocular movements intact.      Conjunctiva/sclera: Conjunctivae normal.      Pupils: Pupils are equal, round, and reactive to light.   Neck:      Vascular: No carotid bruit.   Cardiovascular:      Rate and Rhythm: Normal rate and regular rhythm.      Heart sounds: Normal heart sounds.   Pulmonary:      Effort: Pulmonary effort is normal.      Breath sounds: Normal breath sounds.   Abdominal:      General: Bowel sounds are normal. There is no distension.      Palpations: Abdomen is soft. There is no mass.      Tenderness: There is no abdominal tenderness. There is no right CVA tenderness or left CVA tenderness.   Musculoskeletal:         General: Normal range of motion.      Cervical back: Neck supple. No  tenderness.      Right lower leg: Edema present.      Left lower leg: No edema.   Lymphadenopathy:      Cervical: No cervical adenopathy.   Skin:     General: Skin is warm.   Neurological:      General: No focal deficit present.      Mental Status: He is alert and oriented to person, place, and time.   Psychiatric:         Mood and Affect: Mood normal.         Behavior: Behavior normal.       Physical Exam  Oral exam was performed.  Lungs are clear.  Heart sounds normal.    Vital Signs  Body mass index is 28. Heart rate is 74.    PHQ-9 Total Score:    Results  Laboratory Studies  Kidney function had improved. Cholesterol levels were fine.    Imaging  X-ray showed a small spot on the lung.         Assessment & Plan   Diagnoses and all orders for this visit:    1. Primary hypertension (Primary)  -     CBC Auto Differential; Future    2. Overweight with body mass index (BMI) of 28 to 28.9 in adult  Assessment & Plan:  Patient's (There is no height or weight on file to calculate BMI.) indicates that they are overweight with health conditions that include obstructive sleep apnea, hypertension, dyslipidemias, and GERD . Weight is unchanged. BMI is above average; BMI management plan is completed. We discussed portion control and increasing exercise.       3. Altered bowel function  -     TSH Rfx On Abnormal To Free T4; Future    4. History of malignant neoplasm of prostate    5. Hyperlipidemia, unspecified hyperlipidemia type  -     Comprehensive Metabolic Panel; Future  -     Lipid Panel; Future    6. Osteopenia, unspecified location    7. Sleep disorder  -     Vitamin B12; Future    8. Vitamin D deficiency  -     Vitamin D,25-Hydroxy; Future    9. Esophageal dysphagia  -     Magnesium; Future    10. Lung nodule  -     CT Chest Without Contrast Diagnostic; Future    Other orders  -     Polyethylene Glycol 3350 granules; Use 2 Capfuls Daily.  Dispense: 3350 g; Refill: 3  -     Blood Pressure Monitoring (Adult Blood  Pressure Cuff Lg) kit; Use 1 kit Daily.  Dispense: 1 each; Refill: 0  -     gabapentin (NEURONTIN) 100 MG capsule; Take 1 capsule by mouth 3 (Three) Times a Day.  Dispense: 90 capsule; Refill: 1      Assessment & Plan  1. Pulmonary nodule.  A repeat chest CT scan has been ordered for 03/28/2025 at [facility] to monitor the lung nodule. He is advised to schedule the scan early in the morning and maintain a fasting state for 12 hours prior to the procedure. Adequate hydration is recommended before the scan.    2. Constipation.  He will start with 2 capfuls of MiraLAX daily. If constipation persists, the dose can be increased to 4 capfuls, and then to 8 capfuls if needed. Once bowel movements normalize, the dose can be reduced, but a maintenance dose should be continued daily. A prescription for MiraLAX has been sent to his pharmacy.    3. Neuropathy.  Gabapentin will be prescribed to be taken three times daily for a month to alleviate the tingling sensation. If there is no improvement, the dosage may be increased or the medication discontinued.    4. Sleep disorder.  He will continue taking melatonin 5 mg twice daily for another couple of months. He is advised to contact the office if there is no improvement in his sleep pattern.    5. Esophageal dysphagia.  He is advised to chew his food thoroughly and drink water to aid swallowing. No further intervention is requested at this time.    6. Hyperlipidemia.  His cholesterol levels were checked a month ago and were within normal limits. He is advised to continue monitoring his diet and medication adherence.    7. History of malignant neoplasm of the prostate.  He continues to have annual check-ups with [facility] for prostate cancer monitoring.    8. Primary hypertension.  His blood pressure was slightly elevated during this visit. A blood pressure cuff will be sent to his pharmacy ([facility] in [facility]) for home monitoring. He is instructed to provide 10 blood  pressure readings for review to determine if antihypertensive medication is necessary.    9. Vitamin D deficiency.    10. Osteopenia.    Follow-up  The patient will follow up in 6 months.    Patient Instructions     Health Maintenance Due   Topic Date Due    ZOSTER VACCINE (1 of 2) Never done    RSV Vaccine - Adults (1 - 1-dose 75+ series) Never done    INFLUENZA VACCINE  07/01/2024    COVID-19 Vaccine (4 - 2024-25 season) 09/01/2024    Check blood pressure cuff for accuracy and send 10 blood pressures over 2 weeks.  Watch sodium, alcohol and weight.  Call us if melatonin not helping you sleep    Patient to 12 hour fast for labs and get Chest CT on or about 3/28/2025.    To get done at [facility]       Patient or patient representative verbalized consent for the use of Ambient Listening during the visit with  Maddie Martinez MD for chart documentation. 2/6/2025  17:21 EST

## 2025-02-07 ENCOUNTER — TELEPHONE (OUTPATIENT)
Dept: FAMILY MEDICINE CLINIC | Facility: CLINIC | Age: OVER 89
End: 2025-02-07
Payer: MEDICARE

## 2025-02-07 LAB
25(OH)D3+25(OH)D2 SERPL-MCNC: 31.3 NG/ML (ref 30–100)
ALBUMIN SERPL-MCNC: 4.3 G/DL (ref 3.7–4.7)
ALP SERPL-CCNC: 83 IU/L (ref 44–121)
ALT SERPL-CCNC: 24 IU/L (ref 0–44)
AMBIG ABBREV CMP14 DEFAULT: NORMAL
AMBIG ABBREV LP DEFAULT: NORMAL
AST SERPL-CCNC: 27 IU/L (ref 0–40)
BASOPHILS # BLD AUTO: 0.1 X10E3/UL (ref 0–0.2)
BASOPHILS NFR BLD AUTO: 1 %
BILIRUB SERPL-MCNC: 0.4 MG/DL (ref 0–1.2)
BUN SERPL-MCNC: 24 MG/DL (ref 8–27)
BUN/CREAT SERPL: 14 (ref 10–24)
CALCIUM SERPL-MCNC: 9.9 MG/DL (ref 8.6–10.2)
CHLORIDE SERPL-SCNC: 104 MMOL/L (ref 96–106)
CHOLEST SERPL-MCNC: 145 MG/DL (ref 100–199)
CO2 SERPL-SCNC: 23 MMOL/L (ref 20–29)
CREAT SERPL-MCNC: 1.67 MG/DL (ref 0.76–1.27)
EGFRCR SERPLBLD CKD-EPI 2021: 39 ML/MIN/1.73
EOSINOPHIL # BLD AUTO: 0.1 X10E3/UL (ref 0–0.4)
EOSINOPHIL NFR BLD AUTO: 2 %
ERYTHROCYTE [DISTWIDTH] IN BLOOD BY AUTOMATED COUNT: 12.3 % (ref 11.6–15.4)
GLOBULIN SER CALC-MCNC: 2.6 G/DL (ref 1.5–4.5)
GLUCOSE SERPL-MCNC: 87 MG/DL (ref 70–99)
HCT VFR BLD AUTO: 48 % (ref 37.5–51)
HDLC SERPL-MCNC: 42 MG/DL
HGB BLD-MCNC: 15.3 G/DL (ref 13–17.7)
IMM GRANULOCYTES # BLD AUTO: 0 X10E3/UL (ref 0–0.1)
IMM GRANULOCYTES NFR BLD AUTO: 1 %
LDLC SERPL CALC-MCNC: 84 MG/DL (ref 0–99)
LYMPHOCYTES # BLD AUTO: 0.8 X10E3/UL (ref 0.7–3.1)
LYMPHOCYTES NFR BLD AUTO: 13 %
MAGNESIUM SERPL-MCNC: 2.1 MG/DL (ref 1.6–2.3)
MCH RBC QN AUTO: 27.6 PG (ref 26.6–33)
MCHC RBC AUTO-ENTMCNC: 31.9 G/DL (ref 31.5–35.7)
MCV RBC AUTO: 87 FL (ref 79–97)
MONOCYTES # BLD AUTO: 0.4 X10E3/UL (ref 0.1–0.9)
MONOCYTES NFR BLD AUTO: 7 %
NEUTROPHILS # BLD AUTO: 4.7 X10E3/UL (ref 1.4–7)
NEUTROPHILS NFR BLD AUTO: 76 %
PLATELET # BLD AUTO: 244 X10E3/UL (ref 150–450)
POTASSIUM SERPL-SCNC: 4.5 MMOL/L (ref 3.5–5.2)
PROT SERPL-MCNC: 6.9 G/DL (ref 6–8.5)
RBC # BLD AUTO: 5.54 X10E6/UL (ref 4.14–5.8)
SODIUM SERPL-SCNC: 141 MMOL/L (ref 134–144)
T4 FREE SERPL-MCNC: 1.08 NG/DL (ref 0.82–1.77)
TRIGL SERPL-MCNC: 100 MG/DL (ref 0–149)
TSH SERPL DL<=0.005 MIU/L-ACNC: 3.62 UIU/ML (ref 0.45–4.5)
VIT B12 SERPL-MCNC: 270 PG/ML (ref 232–1245)
VLDLC SERPL CALC-MCNC: 19 MG/DL (ref 5–40)
WBC # BLD AUTO: 6.1 X10E3/UL (ref 3.4–10.8)

## 2025-02-07 NOTE — TELEPHONE ENCOUNTER
Detailed VM left with Provider's comments. Instructed patient to call with any questions or concerns.    Kaye Thakur MA  02/07/25

## 2025-02-07 NOTE — TELEPHONE ENCOUNTER
HUB TO RELAY:  ----- Message from Maddie Martinez sent at 2/7/2025  8:36 AM EST -----  Kidney function has decreased.  I know that you are being treated for prostate cancer.  Have you seen a neurologist?  I would be glad to refer you.  Make sure you are not taking any ibuprofen Aleve Motrin and limit your x-ray dyes.  Vitamin B12 is also decreased so increase of 1000 units daily over-the-counter let us know about the above and call if you have any other questions or concerns

## 2025-02-07 NOTE — PROGRESS NOTES
Kidney function has decreased.  I know that you are being treated for prostate cancer.  Have you seen a neurologist?  I would be glad to refer you.  Make sure you are not taking any ibuprofen Aleve Motrin and limit your x-ray dyes.  Vitamin B12 is also decreased so increase of 1000 units daily over-the-counter let us know about the above and call if you have any other questions or concerns

## 2025-02-10 NOTE — PROGRESS NOTES
Chest CT does not show any changes in the nodules they are stable which is good news.  There is a lot of calcification in the coronary arteries.  Have you been checked by cardiology to make sure they do not need to run you on the treadmill and check to make sure there is no blockage in those coronary arteries if not I think that would be a good idea and I will be glad to put referral and please let me know.  We do need to repeat the chest CT in a year.

## 2025-02-14 RX ORDER — AMLODIPINE BESYLATE 5 MG/1
5 TABLET ORAL DAILY
Qty: 90 TABLET | Refills: 0 | Status: SHIPPED | OUTPATIENT
Start: 2025-02-14

## 2025-05-18 RX ORDER — AMLODIPINE BESYLATE 5 MG/1
5 TABLET ORAL DAILY
Qty: 90 TABLET | Refills: 0 | Status: SHIPPED | OUTPATIENT
Start: 2025-05-18

## 2025-06-02 RX ORDER — ATORVASTATIN CALCIUM 40 MG/1
40 TABLET, FILM COATED ORAL DAILY
Qty: 90 TABLET | Refills: 1 | Status: SHIPPED | OUTPATIENT
Start: 2025-06-02

## 2025-08-11 ENCOUNTER — HOSPITAL ENCOUNTER (OUTPATIENT)
Dept: ULTRASOUND IMAGING | Facility: HOSPITAL | Age: OVER 89
Discharge: HOME | End: 2025-08-11
Admitting: UROLOGY
Payer: MEDICARE

## 2025-08-11 DIAGNOSIS — N28.1 ACQUIRED CYST OF KIDNEY: ICD-10-CM

## 2025-08-11 PROCEDURE — 76775 US EXAM ABDO BACK WALL LIM: CPT

## 2025-08-15 ENCOUNTER — OFFICE VISIT (OUTPATIENT)
Dept: FAMILY MEDICINE CLINIC | Facility: CLINIC | Age: OVER 89
End: 2025-08-15
Payer: MEDICARE

## 2025-08-15 ENCOUNTER — LAB (OUTPATIENT)
Dept: FAMILY MEDICINE CLINIC | Facility: CLINIC | Age: OVER 89
End: 2025-08-15
Payer: MEDICARE

## 2025-08-15 VITALS
DIASTOLIC BLOOD PRESSURE: 76 MMHG | WEIGHT: 180.4 LBS | BODY MASS INDEX: 27.34 KG/M2 | TEMPERATURE: 97.3 F | SYSTOLIC BLOOD PRESSURE: 111 MMHG | HEIGHT: 68 IN | HEART RATE: 78 BPM | OXYGEN SATURATION: 95 %

## 2025-08-15 DIAGNOSIS — E66.3 OVERWEIGHT WITH BODY MASS INDEX (BMI) OF 27 TO 27.9 IN ADULT: ICD-10-CM

## 2025-08-15 DIAGNOSIS — R19.8 ALTERED BOWEL FUNCTION: ICD-10-CM

## 2025-08-15 DIAGNOSIS — R13.19 ESOPHAGEAL DYSPHAGIA: ICD-10-CM

## 2025-08-15 DIAGNOSIS — E78.5 HYPERLIPIDEMIA, UNSPECIFIED HYPERLIPIDEMIA TYPE: ICD-10-CM

## 2025-08-15 DIAGNOSIS — E55.9 VITAMIN D DEFICIENCY: ICD-10-CM

## 2025-08-15 DIAGNOSIS — M85.80 OSTEOPENIA, UNSPECIFIED LOCATION: ICD-10-CM

## 2025-08-15 DIAGNOSIS — M19.90 ARTHRITIS: ICD-10-CM

## 2025-08-15 DIAGNOSIS — G47.9 SLEEP DISORDER: ICD-10-CM

## 2025-08-15 DIAGNOSIS — I10 PRIMARY HYPERTENSION: ICD-10-CM

## 2025-08-15 DIAGNOSIS — Z00.01 ENCOUNTER FOR ANNUAL GENERAL MEDICAL EXAMINATION WITH ABNORMAL FINDINGS IN ADULT: Primary | ICD-10-CM

## 2025-08-15 DIAGNOSIS — Z85.46 HISTORY OF MALIGNANT NEOPLASM OF PROSTATE: ICD-10-CM

## 2025-08-15 PROBLEM — H02.883 MEIBOMIAN GLAND DYSFUNCTION (MGD) OF BOTH EYES: Status: ACTIVE | Noted: 2025-06-03

## 2025-08-15 PROBLEM — B88.0 PARASITIC INFESTATION OF EYELID DUE TO DEMODEX SPECIES: Status: ACTIVE | Noted: 2025-06-03

## 2025-08-15 PROBLEM — H43.819 POSTERIOR VITREOUS DETACHMENT: Status: ACTIVE | Noted: 2025-07-22

## 2025-08-15 PROBLEM — H02.30 EXCESS SKIN OF EYELID: Status: ACTIVE | Noted: 2025-07-22

## 2025-08-15 PROBLEM — H35.3190 NONEXUDATIVE AGE-RELATED MACULAR DEGENERATION: Status: ACTIVE | Noted: 2025-07-22

## 2025-08-15 PROBLEM — H01.009 BLEPHARITIS: Status: ACTIVE | Noted: 2025-06-03

## 2025-08-15 PROBLEM — T15.01XA FOREIGN BODY OF RIGHT CORNEA: Status: ACTIVE | Noted: 2022-05-11

## 2025-08-15 PROBLEM — Z96.1 PRESENCE OF INTRAOCULAR LENS: Status: ACTIVE | Noted: 2025-07-22

## 2025-08-15 PROBLEM — H02.886 MEIBOMIAN GLAND DYSFUNCTION (MGD) OF BOTH EYES: Status: ACTIVE | Noted: 2025-06-03

## 2025-08-15 PROBLEM — H01.8 PARASITIC INFESTATION OF EYELID DUE TO DEMODEX SPECIES: Status: ACTIVE | Noted: 2025-06-03

## 2025-08-15 PROBLEM — R39.15 URINARY URGENCY: Status: ACTIVE | Noted: 2025-08-15

## 2025-08-15 PROCEDURE — 82607 VITAMIN B-12: CPT | Performed by: PREVENTIVE MEDICINE

## 2025-08-15 PROCEDURE — 85025 COMPLETE CBC W/AUTO DIFF WBC: CPT | Performed by: PREVENTIVE MEDICINE

## 2025-08-15 PROCEDURE — 80053 COMPREHEN METABOLIC PANEL: CPT | Performed by: PREVENTIVE MEDICINE

## 2025-08-15 PROCEDURE — 82306 VITAMIN D 25 HYDROXY: CPT | Performed by: PREVENTIVE MEDICINE

## 2025-08-15 PROCEDURE — 83735 ASSAY OF MAGNESIUM: CPT | Performed by: PREVENTIVE MEDICINE

## 2025-08-15 PROCEDURE — 84443 ASSAY THYROID STIM HORMONE: CPT | Performed by: PREVENTIVE MEDICINE

## 2025-08-15 PROCEDURE — 80061 LIPID PANEL: CPT | Performed by: PREVENTIVE MEDICINE

## 2025-08-16 ENCOUNTER — RESULTS FOLLOW-UP (OUTPATIENT)
Dept: FAMILY MEDICINE CLINIC | Facility: CLINIC | Age: OVER 89
End: 2025-08-16
Payer: MEDICARE

## 2025-08-27 RX ORDER — AMLODIPINE BESYLATE 5 MG/1
5 TABLET ORAL DAILY
Qty: 90 TABLET | Refills: 0 | Status: SHIPPED | OUTPATIENT
Start: 2025-08-27

## (undated) DEVICE — KT PEG ENDOVIVE ENFIT SFTY PUSH 20F 1P/U

## (undated) DEVICE — GLV SURG SIGNATURE ESSENTIAL PF LTX SZ6.5

## (undated) DEVICE — PK ENDO GI 50

## (undated) DEVICE — BITEBLOCK ENDO W/STRAP 60F A/ LF DISP

## (undated) DEVICE — MARKR SKIN W/RULR